# Patient Record
Sex: FEMALE | Race: WHITE | NOT HISPANIC OR LATINO | Employment: UNEMPLOYED | URBAN - METROPOLITAN AREA
[De-identification: names, ages, dates, MRNs, and addresses within clinical notes are randomized per-mention and may not be internally consistent; named-entity substitution may affect disease eponyms.]

---

## 2017-02-13 DIAGNOSIS — H81.01 MENIERE'S DISEASE, RIGHT: ICD-10-CM

## 2017-02-13 NOTE — TELEPHONE ENCOUNTER
diazepam (VALIUM) 5 MG tablet      Last Written Prescription Date:  8/31/16  Last Fill Quantity: 60,   # refills: 0  Last Office Visit with [facility], [email or web address] or [facility] prescribing provider: 12/14/16  Future Office visit:       Routing refill request to provider for review/approval because:  Drug not on the [facility], [email or web address] or [facility] refill protocol or controlled substance

## 2017-02-14 RX ORDER — DIAZEPAM 5 MG
TABLET ORAL
Qty: 60 TABLET | Refills: 0 | Status: SHIPPED | OUTPATIENT
Start: 2017-02-14 | End: 2017-06-14

## 2017-02-15 DIAGNOSIS — G47.00 PERSISTENT INSOMNIA: ICD-10-CM

## 2017-02-15 DIAGNOSIS — H81.09 MENIERE DISEASE, UNSPECIFIED LATERALITY: Primary | ICD-10-CM

## 2017-02-15 RX ORDER — TRAZODONE HYDROCHLORIDE 50 MG/1
50 TABLET, FILM COATED ORAL
Qty: 30 TABLET | Refills: 1 | Status: SHIPPED | OUTPATIENT
Start: 2017-02-15 | End: 2017-05-17

## 2017-02-15 NOTE — TELEPHONE ENCOUNTER
traZODone      Last Written Prescription Date:  12/14/16  Last Fill Quantity: 30,   # refills: 1  Last Office Visit with [facility], [facility] or  [facility] prescribing provider: 12/14/16  Last date of pharmacy refill:  12/14/16  Future Office visit:       Routing refill request to provider for review/approval because:  Drug not on the [facility], [facility] or  [facility] refill protocol or controlled substance

## 2017-03-09 DIAGNOSIS — F41.9 ANXIETY: ICD-10-CM

## 2017-03-09 NOTE — TELEPHONE ENCOUNTER
Effexor    Last Written Prescription Date: 02/07/17  Last Fill Quantity: 30, # refills: 1  Last Office Visit with [facility], [facility] or [facility] prescribing provider: 12/14/16        BP Readings from Last 3 Encounters:   12/14/16 122/70   09/23/16 131/74   09/12/16 123/72     Pulse: (for Fetzima)  Creatinine   Date Value Ref Range Status   08/31/2016 0.75 0.52 - 1.04 mg/dL Final   06/16/2004 1.0 0.0 - 1.5 mg/dL    ]    Last PHQ-9 score on record=   PHQ-9 SCORE 8/17/2016   Total Score -   Total Score MyChart -   Total Score 3

## 2017-03-10 RX ORDER — VENLAFAXINE HYDROCHLORIDE 150 MG/1
150 CAPSULE, EXTENDED RELEASE ORAL DAILY
Qty: 90 CAPSULE | Refills: 0 | Status: SHIPPED | OUTPATIENT
Start: 2017-03-10 | End: 2017-06-06

## 2017-04-08 DIAGNOSIS — G47.00 PERSISTENT INSOMNIA: ICD-10-CM

## 2017-04-10 NOTE — TELEPHONE ENCOUNTER
Zolpidem Tartrate Oral Tablet 10 MG        Last Written Prescription Date:  11/15/16  Last Fill Quantity: 30,   # refills: 4  Last Office Visit with [facility], [facility] or [facility] prescribing provider: 12/14/16  Future Office visit:       Routing refill request to provider for review/approval because:  Drug not on the [facility], [facility] or [facility] refill protocol or controlled substance

## 2017-04-11 RX ORDER — ZOLPIDEM TARTRATE 10 MG/1
TABLET ORAL
Qty: 30 TABLET | Refills: 3 | Status: SHIPPED | OUTPATIENT
Start: 2017-04-11 | End: 2017-08-08

## 2017-05-17 DIAGNOSIS — G47.00 PERSISTENT INSOMNIA: ICD-10-CM

## 2017-05-18 NOTE — TELEPHONE ENCOUNTER
TraZODone HCl Oral Tablet 50 MG         Last Written Prescription Date: 2/15/17  Last Fill Quantity: 30; # refills: 1  Last Office Visit with [facility], [facility] or [facility] prescribing provider:  12/14/16        Last PHQ-9 score on record=   PHQ-9 SCORE 8/17/2016   Total Score -   Total Score MyChart -   Total Score 3       Lab Results   Component Value Date    AST 36 08/17/2016     Lab Results   Component Value Date    ALT 39 08/17/2016

## 2017-05-19 RX ORDER — TRAZODONE HYDROCHLORIDE 50 MG/1
TABLET, FILM COATED ORAL
Qty: 30 TABLET | Refills: 1 | Status: SHIPPED | OUTPATIENT
Start: 2017-05-19 | End: 2017-05-19

## 2017-05-19 RX ORDER — TRAZODONE HYDROCHLORIDE 50 MG/1
TABLET, FILM COATED ORAL
Qty: 30 TABLET | Refills: 1 | Status: SHIPPED | OUTPATIENT
Start: 2017-05-19 | End: 2017-11-04

## 2017-05-19 NOTE — TELEPHONE ENCOUNTER
Marlyn requesting medication on 5/17 and it was send to RN refill pool.    She was hoping to get this filled today, as she is leaving after work today for [place].  Please review and fill if appropriate.  Thank you..Evette Ricks

## 2017-05-19 NOTE — TELEPHONE ENCOUNTER
Refilled per RN protocol. Sent to [facility] in [facility], called patient and she wishes it to go to [facility] in [place] instead. Script sent to correct pharmacy and patient is notified.  Aicha

## 2017-06-06 DIAGNOSIS — F41.9 ANXIETY: ICD-10-CM

## 2017-06-06 NOTE — TELEPHONE ENCOUNTER
Venlafaxine HCl ER Oral Capsule Extended Release 24 Hour 150 MG       Last Written Prescription Date: 3/10/17  Last Fill Quantity: 90, # refills: 0  Last Office Visit with [facility] primary care provider:  12/14/16        Last PHQ-9 score on record=   PHQ-9 SCORE 8/17/2016   Total Score -   Total Score MyChart -   Total Score 3

## 2017-06-08 RX ORDER — VENLAFAXINE HYDROCHLORIDE 150 MG/1
CAPSULE, EXTENDED RELEASE ORAL
Qty: 90 CAPSULE | Refills: 0 | Status: SHIPPED | OUTPATIENT
Start: 2017-06-08 | End: 2017-09-07

## 2017-06-08 NOTE — TELEPHONE ENCOUNTER
Medication is being filled for 1 time refill only due to:  Patient needs to be seen because needs follow up and phq9.   Mark Cosme RN

## 2017-06-14 ENCOUNTER — OFFICE VISIT (OUTPATIENT)
Dept: FAMILY MEDICINE | Facility: CLINIC | Age: 57
End: 2017-06-14
Payer: COMMERCIAL

## 2017-06-14 VITALS
SYSTOLIC BLOOD PRESSURE: 108 MMHG | HEART RATE: 65 BPM | TEMPERATURE: 97.5 F | DIASTOLIC BLOOD PRESSURE: 61 MMHG | BODY MASS INDEX: 35.85 KG/M2 | WEIGHT: 202.4 LBS

## 2017-06-14 DIAGNOSIS — R05.9 COUGH: ICD-10-CM

## 2017-06-14 DIAGNOSIS — R07.0 THROAT PAIN: Primary | ICD-10-CM

## 2017-06-14 DIAGNOSIS — J30.2 SEASONAL ALLERGIC RHINITIS, UNSPECIFIED ALLERGIC RHINITIS TRIGGER: ICD-10-CM

## 2017-06-14 DIAGNOSIS — L40.9 PSORIASIS: ICD-10-CM

## 2017-06-14 DIAGNOSIS — H81.01 MENIERE'S DISEASE, RIGHT: ICD-10-CM

## 2017-06-14 LAB
DEPRECATED S PYO AG THROAT QL EIA: NORMAL
MICRO REPORT STATUS: NORMAL
SPECIMEN SOURCE: NORMAL

## 2017-06-14 PROCEDURE — 87070 CULTURE OTHR SPECIMN AEROBIC: CPT | Performed by: PHYSICIAN ASSISTANT

## 2017-06-14 PROCEDURE — 87880 STREP A ASSAY W/OPTIC: CPT | Performed by: PHYSICIAN ASSISTANT

## 2017-06-14 PROCEDURE — 99213 OFFICE O/P EST LOW 20 MIN: CPT | Performed by: PHYSICIAN ASSISTANT

## 2017-06-14 RX ORDER — DIAZEPAM 5 MG
TABLET ORAL
Qty: 60 TABLET | Refills: 0 | Status: SHIPPED | OUTPATIENT
Start: 2017-06-14 | End: 2017-07-06

## 2017-06-14 RX ORDER — CODEINE PHOSPHATE AND GUAIFENESIN 10; 100 MG/5ML; MG/5ML
1-2 SOLUTION ORAL EVERY 4 HOURS PRN
Qty: 120 ML | Refills: 0 | Status: SHIPPED | OUTPATIENT
Start: 2017-06-14 | End: 2017-12-14

## 2017-06-14 RX ORDER — BETAMETHASONE DIPROPIONATE 0.05 %
OINTMENT (GRAM) TOPICAL
Qty: 45 G | Refills: 0 | Status: SHIPPED | OUTPATIENT
Start: 2017-06-14 | End: 2019-07-19

## 2017-06-14 RX ORDER — PREDNISONE 20 MG/1
TABLET ORAL
Qty: 20 TABLET | Refills: 0 | Status: SHIPPED | OUTPATIENT
Start: 2017-06-14 | End: 2018-09-27

## 2017-06-14 NOTE — PATIENT INSTRUCTIONS
Have plenty of rest and fluids  Humidified air can be very helpful with cough  Use robitussin at night - can make you groggy  Flonase. Use nasal spray Afrin OTC for 3-4 days in each nostril for congestion  Follow up if worsening symptoms or if not improving - especially be seen if difficulty swallowing, increased pain, worsening symptoms or not keeping fluids down    Use your Meniere medication as needed

## 2017-06-14 NOTE — MR AVS SNAPSHOT
After Visit Summary   6/14/2017    Kenya Rubalcava    MRN: [id number]           Patient Information     Date Of Birth          1960        Visit Information        Provider Department      6/14/2017 9:00 AM Evelyne Samuels PA-C [facility]        Today's Diagnoses     Throat pain    -  1    Meniere's disease, right        Seasonal allergic rhinitis, unspecified allergic rhinitis trigger        Psoriasis        Cough          Care Instructions    Have plenty of rest and fluids  Humidified air can be very helpful with cough  Use robitussin at night - can make you groggy  Flonase. Use nasal spray Afrin OTC for 3-4 days in each nostril for congestion  Follow up if worsening symptoms or if not improving - especially be seen if difficulty swallowing, increased pain, worsening symptoms or not keeping fluids down    Use your Meniere medication as needed          Follow-ups after your visit        Who to contact     Normal or non-critical lab and imaging results will be communicated to you by [employer], letter or phone within 4 business days after the clinic has received the results. If you do not hear from us within 7 days, please contact the clinic through [employer] or phone. If you have a critical or abnormal lab result, we will notify you by phone as soon as possible.  Submit refill requests through [employer] or call your pharmacy and they will forward the refill request to us. Please allow 3 business days for your refill to be completed.          If you need to speak with a  for additional information , please call: [phone]             Additional Information About Your Visit        [employer] Information     [employer] gives you secure access to your electronic health record. If you see a primary care provider, you can also send messages to your care team and make appointments. If you have questions, please call your primary care clinic.  If you do not have a primary care  provider, please call [phone] and they will assist you.        Care EveryWhere ID     This is your Care EveryWhere ID. This could be used by other organizations to access your [facility] medical records  [id number]        Your Vitals Were     Pulse Temperature BMI (Body Mass Index)             65 97.5  F (36.4  C) (Tympanic) 35.85 kg/m2          Blood Pressure from Last 3 Encounters:   06/14/17 108/61   12/14/16 122/70   09/23/16 131/74    Weight from Last 3 Encounters:   06/14/17 202 lb 6.4 oz (91.8 kg)   12/14/16 194 lb 1.6 oz (88 kg)   09/23/16 190 lb (86.2 kg)              We Performed the Following     Strep, Rapid Screen     Throat Culture Aerobic Bacterial          Today's Medication Changes          These changes are accurate as of: 6/14/17  9:35 AM.  If you have any questions, ask your nurse or doctor.               Start taking these medicines.        Dose/Directions    betamethasone dipropionate 0.05 % ointment   Commonly known as:  DIPROSONE   Used for:  Psoriasis   Started by:  Evelyne Samuels PA-C        Apply sparingly to affected area twice daily as needed.  Do not apply to face.   Quantity:  45 g   Refills:  0       guaiFENesin-codeine 100-10 MG/5ML Soln solution   Commonly known as:  ROBITUSSIN AC   Used for:  Cough   Started by:  Evelyne Samuels PA-C        Dose:  1-2 tsp.   Take 5-10 mLs by mouth every 4 hours as needed   Quantity:  120 mL   Refills:  0       predniSONE 20 MG tablet   Commonly known as:  DELTASONE   Used for:  Seasonal allergic rhinitis, unspecified allergic rhinitis trigger, Meniere's disease, right   Started by:  Evelyne Samuels PA-C        Take 3 tabs (60 mg) orally daily for 3 days, 2 tabs (40 mg) daily for 3 days, 1 tab (20 mg) daily for 3 days, then 1/2 tab (10 mg) for 3 day   Quantity:  20 tablet   Refills:  0            Where to get your medicines      These medications were sent to [facility] [place], [place] - [address] [facility] [address]  [address] [facility] [address]     Phone:  [phone]     betamethasone dipropionate 0.05 % ointment    predniSONE 20 MG tablet         Some of these will need a paper prescription and others can be bought over the counter.  Ask your nurse if you have questions.     Bring a paper prescription for each of these medications     diazepam 5 MG tablet    guaiFENesin-codeine 100-10 MG/5ML Soln solution                Primary Care Provider Office Phone # Fax #    Carlie Real [phone]       [facility] [address] [facility] [address]        Thank you!     Thank you for choosing [facility]  for your care. Our goal is always to provide you with excellent care. Hearing back from our patients is one way we can continue to improve our services. Please take a few minutes to complete the written survey that you may receive in the mail after your visit with us. Thank you!             Your Updated Medication List - Protect others around you: Learn how to safely use, store and throw away your medicines at www.disposemymeds.org.          This list is accurate as of: 6/14/17  9:35 AM.  Always use your most recent med list.                   Brand Name Dispense Instructions for use    aspirin 325 MG EC tablet      one tablet by mouth daily (buffered)       atenolol 50 MG tablet    TENORMIN    90 tablet    Take 1 tablet (50 mg) by mouth daily       betamethasone dipropionate 0.05 % ointment    DIPROSONE    45 g    Apply sparingly to affected area twice daily as needed.  Do not apply to face.       diazepam 5 MG tablet    VALIUM    60 tablet    Take 1 as needed for menieres daily       DIPHENHYDRAMINE HCL PO      Take 25 mg by mouth daily as needed (for Menieres disease)       fluticasone 50 MCG/ACT spray    FLONASE    3 Package    Spray 1-2 sprays into both nostrils daily       guaiFENesin-codeine 100-10 MG/5ML Soln solution    ROBITUSSIN AC    120 mL    Take 5-10 mLs by mouth every 4  hours as needed       meclizine 25 MG tablet    ANTIVERT    60 tablet    Take 2 tablets by mouth 2 times daily as needed.       pravastatin 40 MG tablet    PRAVACHOL    30 tablet    Take 1 tablet (40 mg) by mouth At Bedtime       predniSONE 20 MG tablet    DELTASONE    20 tablet    Take 3 tabs (60 mg) orally daily for 3 days, 2 tabs (40 mg) daily for 3 days, 1 tab (20 mg) daily for 3 days, then 1/2 tab (10 mg) for 3 day       prochlorperazine 10 MG tablet    COMPAZINE    10 tablet    Take 0.5 tablets (5 mg) by mouth every 6 hours as needed for nausea or vomiting       traZODone 50 MG tablet    DESYREL    30 tablet    TAKE 1 TABLET (50 MG) BY MOUTH ONCE A DAY AT BEDTIME AS NEEDED FOR SLEEP       triamterene-hydrochlorothiazide 37.5-25 MG per capsule    DYAZIDE    90 capsule    Take 1 capsule by mouth every morning       venlafaxine 150 MG 24 hr capsule    EFFEXOR-XR    90 capsule    take 1 capsule by mouth once daily       zolpidem 10 MG tablet    AMBIEN    30 tablet    TAKE 1 TABLET BY MOUTH NIGHTLY AT BEDTIME AS NEEDED FOR SLEEP

## 2017-06-14 NOTE — PROGRESS NOTES
SUBJECTIVE:                                                    Kenya Rubalcava is a 56 year old female who presents to clinic today for the following health issues:    ENT Symptoms             Symptoms: cc Present Absent Comment   Fever/Chills   x    Fatigue  x     Muscle Aches   x    Eye Irritation   x    Sneezing   x    Nasal Isaías/Drg  x     Sinus Pressure/Pain   x    Loss of smell   x    Dental pain   x    Sore Throat x x     Swollen Glands  x     Ear Pain/Fullness  x  Pain in left ear last couple days   Cough  x     Wheeze   x    Chest Pain   x    Shortness of breath   x    Rash   x    Other  x  Headaches, dizziness     Symptom duration:  Started Sunday   Symptom severity:  moderate   Treatments tried:  None   Contacts:  coworkers - principal of school, 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 Just that day. Since then keeping fluids down, eating okay.  She has known Meniere's disease, usually triggered by congestion.  - has valium/meclizine, lies down. Also supposed to have prednisone to prevent full attack (hasn't gotten to that point yet)  - has not become full blown at this point  Has springtime allergies  - hasn't had to use flonase yet    Problem list and histories reviewed & adjusted, as indicated.  Additional history: none    Patient Active Problem List   Diagnosis     Anxiety disorder due to medical condition     Essential hypertension, benign     Sensorineural hearing loss     Peripheral vertigo     Allergic rhinitis     Symptomatic menopausal or female climacteric states     Psoriasis     Hyperlipidemia LDL goal <130     Kidney stones     Hypertriglyceridemia     Meniere disease     Anxiety     Kidney stone     Past Surgical History:   Procedure Laterality Date     CARPAL TUNNEL RELEASE RT/LT  08/2008    Left      COLONOSCOPY  8/7/2012    Procedure: COLONOSCOPY;  Colonoscopy;  Surgeon: Kailyn Lopez MD;  Location: [facility]     CYSTOSCOPY, RETROGRADES, INSERT STENT URETER(S),  COMBINED Left 8/19/2016    Procedure: COMBINED CYSTOSCOPY, RETROGRADES, INSERT STENT URETER(S);  Surgeon: Terence Carpenter MD;  Location: [facility]     esteem implant  06/23/2011    Hearing implant right ear.     HYSTERECTOMY, PAP NO LONGER INDICATED  12/2004    Vaginal hysterectomy     LAPAROSCOPIC ABLATION ENDOMETRIOSIS  07/2004     LASER HOLMIUM LITHOTRIPSY URETER(S), INSERT STENT, COMBINED Left 9/12/2016    Procedure: COMBINED CYSTOSCOPY, URETEROSCOPY, LASER HOLMIUM LITHOTRIPSY URETER(S), INSERT STENT;  Surgeon: Kailyn Dozier MD;  Location: [facility]     LEFT wrist surgery       Sinus surgery with repair of deviated septum       TUBAL LIGATION         Social History   Substance Use Topics     Smoking status: Never Smoker     Smokeless tobacco: Never Used     Alcohol use 0.0 oz/week     0 Standard drinks or equivalent per week      Comment: rare      Family History   Problem Relation Age of Onset     HEART DISEASE Brother      Hypertension Brother      Asthma Daughter      HEART DISEASE Brother      Hypertension Brother      C.A.D. Father      MI      Hypertension Father      Lipids Father 50     C.A.D. Paternal Aunt      MI-50s     CANCER Maternal Aunt      Ovarian CA     CEREBROVASCULAR DISEASE No family hx of      Breast Cancer No family hx of      Cancer - colorectal No family hx of      Prostate Cancer No family hx of          Labs reviewed in EPIC    ROS:  Other than noted above, general, HEENT, respiratory, cardiac, MS, and gastrointestinal systems are negative.     OBJECTIVE:                                                    /61 (BP Location: Left arm, Patient Position: Chair, Cuff Size: Adult Regular)  Pulse 65  Temp 97.5  F (36.4  C) (Tympanic)  Wt 202 lb 6.4 oz (91.8 kg)  BMI 35.85 kg/m2 Body mass index is 35.85 kg/(m^2).   GENERAL: healthy, alert, well nourished, well hydrated, no distress  EYES: Eyes grossly normal to inspection, extraocular movements - intact, and PERRL  HENT:  ear canals- normal; TMs- normal; Nose- normal; Mouth- no ulcers, no lesions POSITIVE posterior oropharynx erythematous, no tonsillitis, uvula midline  NECK: POSITIVE tender submandibular adenopathy, no asymmetry, no masses, no stiffness; thyroid- normal to palpation  RESP: lungs clear to auscultation - no rales, no rhonchi, no wheezes  CV: regular rates and rhythm, normal S1 S2, no S3 or S4 and no murmur, no click or rub -  ABDOMEN: soft, no tenderness, no  hepatosplenomegaly, no masses, normal bowel sounds  SKIN: POSITIVE dry skin patches on bilateral elbows     ASSESSMENT/PLAN:                                                      ASSESSMENT/PLAN:      ICD-10-CM    1. Throat pain R07.0 Strep, Rapid Screen     Throat Culture Aerobic Bacterial   2. Meniere's disease, right H81.01 diazepam (VALIUM) 5 MG tablet     predniSONE (DELTASONE) 20 MG tablet   3. Seasonal allergic rhinitis, unspecified allergic rhinitis trigger J30.2 predniSONE (DELTASONE) 20 MG tablet   4. Psoriasis L40.9 betamethasone dipropionate (DIPROSONE) 0.05 % ointment   5. Cough R05 guaiFENesin-codeine (ROBITUSSIN AC) 100-10 MG/5ML SOLN solution     She feels her symptoms are typical of mild Meneire's for her - refilled the meds she keeps on hand. And likely viral syndrome as well.  Discussed red flag signs to be seen urgently.  She also would like psoriasis cream refilled.    Patient Instructions   Have plenty of rest and fluids  Humidified air can be very helpful with cough  Use robitussin at night - can make you groggy  Flonase. Use nasal spray Afrin OTC for 3-4 days in each nostril for congestion  Follow up if worsening symptoms or if not improving - especially be seen if difficulty swallowing, increased pain, worsening symptoms or not keeping fluids down    Use your Meniere medication as needed    Evelyne Samuels PA-C   [facility]

## 2017-06-14 NOTE — LETTER
[facility]  [address]  [facility] [address]  Phone: [phone]    June 19, 2017    Kenya Rubalcava  [address]  [facility] [address]              Dear Ms. Rubalcava,    Hi Kenya,  Your throat culture is normal - please follow up if symptoms worsen or are not improving.    Component      Latest Ref Rng & Units 6/14/2017   Specimen Description       Throat   Culture Micro       Normal respiratory guillermo   Micro Report Status       FINAL 06/16/2017             Sincerely,      Luz Maria REILLY/ yael

## 2017-06-16 LAB
BACTERIA SPEC CULT: NORMAL
MICRO REPORT STATUS: NORMAL
SPECIMEN SOURCE: NORMAL

## 2017-07-06 DIAGNOSIS — H81.01 MENIERE'S DISEASE, RIGHT: ICD-10-CM

## 2017-07-06 NOTE — TELEPHONE ENCOUNTER
diazepam (VALIUM) 5 MG tablet      Last Written Prescription Date:  6/14/17  Last Fill Quantity: 60,   # refills: 0  Last Office Visit with [facility], [facility] or [facility] prescribing provider: 6/14/17  Future Office visit:       Routing refill request to provider for review/approval because:  Drug not on the [facility], [facility] or [facility] refill protocol or controlled substance

## 2017-07-07 RX ORDER — DIAZEPAM 5 MG
TABLET ORAL
Qty: 60 TABLET | Refills: 0 | Status: SHIPPED | OUTPATIENT
Start: 2017-07-07 | End: 2018-12-05

## 2017-07-07 RX ORDER — DIAZEPAM 5 MG
5 TABLET ORAL EVERY 12 HOURS PRN
Qty: 60 TABLET | Refills: 0 | Status: SHIPPED | OUTPATIENT
Start: 2017-07-07 | End: 2017-12-14

## 2017-07-07 NOTE — TELEPHONE ENCOUNTER
Script faxed to [facility] pharmacy in [place].    Poornima Baez, [facility]

## 2017-07-30 DIAGNOSIS — E78.5 HYPERLIPIDEMIA LDL GOAL <130: ICD-10-CM

## 2017-07-31 RX ORDER — PRAVASTATIN SODIUM 40 MG
TABLET ORAL
Qty: 90 TABLET | Refills: 0 | Status: SHIPPED | OUTPATIENT
Start: 2017-07-31 | End: 2017-10-29

## 2017-07-31 NOTE — TELEPHONE ENCOUNTER
Pravastatin Sodium Oral Tablet 40 MG       Last Written Prescription Date: 12/14/16  Last Fill Quantity: 30, # refills: 6  Last Office Visit with [facility] or [facility] prescribing provider: 6/14/17       Lab Results   Component Value Date    CHOL 185 08/17/2016     Lab Results   Component Value Date    HDL 31 08/17/2016     Lab Results   Component Value Date    LDL 84 08/17/2016     Lab Results   Component Value Date    TRIG 349 08/17/2016     Lab Results   Component Value Date    CHOLHDLRATIO 6.9 08/18/2015

## 2017-08-08 DIAGNOSIS — G47.00 PERSISTENT INSOMNIA: ICD-10-CM

## 2017-08-08 NOTE — TELEPHONE ENCOUNTER
zolpidem (AMBIEN) 10 MG tablet      Last Written Prescription Date:  4/11/17  Last Fill Quantity: 30,   # refills: 3  Last Office Visit with [facility], [facility] or [facility] prescribing provider: 6/14/17  Future Office visit:       Routing refill request to provider for review/approval because:  Drug not on the [facility], [facility] or [facility] refill protocol or controlled substance

## 2017-08-14 RX ORDER — ZOLPIDEM TARTRATE 10 MG/1
TABLET ORAL
Qty: 30 TABLET | Refills: 3 | Status: SHIPPED | OUTPATIENT
Start: 2017-08-14 | End: 2017-12-14

## 2017-08-21 DIAGNOSIS — E78.5 HYPERLIPIDEMIA LDL GOAL <130: ICD-10-CM

## 2017-08-21 NOTE — TELEPHONE ENCOUNTER
Triamterene-HCTZ Oral Capsule 37.5-25 MG        Last Written Prescription Date: 8/17/16  Last Fill Quantity: 90, # refills: 3  Last Office Visit with [facility], [facility] or [facility] prescribing provider: 6/14/17       Potassium   Date Value Ref Range Status   08/31/2016 4.3 3.4 - 5.3 mmol/L Final     Creatinine   Date Value Ref Range Status   08/31/2016 0.75 0.52 - 1.04 mg/dL Final     BP Readings from Last 3 Encounters:   06/14/17 108/61   12/14/16 122/70   09/23/16 131/74

## 2017-08-22 RX ORDER — TRIAMTERENE AND HYDROCHLOROTHIAZIDE 37.5; 25 MG/1; MG/1
CAPSULE ORAL
Qty: 90 CAPSULE | Refills: 0 | Status: SHIPPED | OUTPATIENT
Start: 2017-08-22 | End: 2017-11-23

## 2017-08-22 NOTE — TELEPHONE ENCOUNTER
Prescription approved per [facility] Refill Protocol.  Mark Cosme RN

## 2017-09-16 DIAGNOSIS — I10 ESSENTIAL HYPERTENSION, BENIGN: ICD-10-CM

## 2017-09-18 RX ORDER — ATENOLOL 50 MG/1
TABLET ORAL
Qty: 90 TABLET | Refills: 2 | Status: SHIPPED | OUTPATIENT
Start: 2017-09-18 | End: 2018-06-11

## 2017-09-18 NOTE — TELEPHONE ENCOUNTER
Prescription approved per [facility] Refill Protocol.  Mark Cosme RN

## 2017-09-18 NOTE — TELEPHONE ENCOUNTER
Atenolol Oral Tablet 50 MG        Last Written Prescription Date: 9/21/16  Last Fill Quantity: 90, # refills: 3    Last Office Visit with [facility], [facility] or [facility] prescribing provider:  6/14/17   Future Office Visit:        BP Readings from Last 3 Encounters:   06/14/17 108/61   12/14/16 122/70   09/23/16 131/74

## 2017-10-29 DIAGNOSIS — E78.5 HYPERLIPIDEMIA LDL GOAL <130: ICD-10-CM

## 2017-10-29 DIAGNOSIS — I10 ESSENTIAL HYPERTENSION, BENIGN: Primary | ICD-10-CM

## 2017-10-30 RX ORDER — PRAVASTATIN SODIUM 40 MG
TABLET ORAL
Qty: 30 TABLET | Refills: 0 | Status: SHIPPED | OUTPATIENT
Start: 2017-10-30 | End: 2017-12-02

## 2017-10-30 NOTE — TELEPHONE ENCOUNTER
Medication is being filled for 1 time refill only due to:  Patient needs labs lipids,cmp. Future labs ordered yes.   Mark Cosme RN

## 2017-11-04 DIAGNOSIS — G47.00 PERSISTENT INSOMNIA: ICD-10-CM

## 2017-11-07 RX ORDER — TRAZODONE HYDROCHLORIDE 50 MG/1
TABLET, FILM COATED ORAL
Qty: 30 TABLET | Refills: 0 | Status: SHIPPED | OUTPATIENT
Start: 2017-11-07 | End: 2017-12-04

## 2017-11-23 DIAGNOSIS — E78.5 HYPERLIPIDEMIA LDL GOAL <130: ICD-10-CM

## 2017-11-24 NOTE — TELEPHONE ENCOUNTER
Prescription approved per [facility] Refill Protocol.  Marlyn Duffy RN

## 2017-11-25 ENCOUNTER — HOSPITAL ENCOUNTER (EMERGENCY)
Facility: CLINIC | Age: 57
Discharge: HOME | End: 2017-11-25
Attending: PHYSICIAN ASSISTANT | Admitting: PHYSICIAN ASSISTANT
Payer: COMMERCIAL

## 2017-11-25 VITALS
OXYGEN SATURATION: 96 % | RESPIRATION RATE: 16 BRPM | DIASTOLIC BLOOD PRESSURE: 67 MMHG | TEMPERATURE: 97.8 F | SYSTOLIC BLOOD PRESSURE: 125 MMHG

## 2017-11-25 DIAGNOSIS — L60.0 INGROWING TOENAIL WITH INFECTION: Primary | ICD-10-CM

## 2017-11-25 PROCEDURE — 99213 OFFICE O/P EST LOW 20 MIN: CPT | Performed by: PHYSICIAN ASSISTANT

## 2017-11-25 PROCEDURE — 99213 OFFICE O/P EST LOW 20 MIN: CPT

## 2017-11-25 RX ORDER — CEPHALEXIN 500 MG/1
500 CAPSULE ORAL 4 TIMES DAILY
Qty: 28 CAPSULE | Refills: 0 | Status: SHIPPED | OUTPATIENT
Start: 2017-11-25 | End: 2017-12-02

## 2017-11-25 NOTE — ED PROVIDER NOTES
History     Chief Complaint   Patient presents with     Toe Pain     Pt has pedicure a month ago.  Having L great toe pain and swelling thinks it is infected,       HPI  Kenyase Rubalcava is a 57 year old female who presents with complaints of left great toe pain and swelling over the past month.  Patient states after she got a pedicure about a month ago, she subsequently developed an ingrowing left great toenail.  She states it has been progressively worsening.  Pt reports worsening surrounding swelling and redness.  The pain has also been worsening.  Patient denies any associated fevers or chills.  She is concerned it is now infected.  Pt has been soaking her toe without improvement.     Problem List:    Patient Active Problem List    Diagnosis Date Noted     Kidney stone 07/19/2016     Priority: Medium     Anxiety 05/14/2015     Priority: Medium     Meniere disease 11/11/2010     Priority: Medium     Diagnosed 10/10 - active in left ear. Trying dyazide daily, serax twice daily, meclizine qid        Kidney stones 06/15/2009     Priority: Medium     Bilateral noted on CT 5/23/09       Hypertriglyceridemia 06/15/2009     Priority: Medium     Hyperlipidemia LDL goal <130 06/30/2008     Priority: Medium     Recent Labs   Lab Test 11/26/10 0847 7/1/10 0927     CHOL 188 164     HDL 28* 27*     LDL Cannot estimate LDL when triglyceride exceeds 400 mg/dL93 Cannot estimate LDL when triglyceride exceeds 400 mg/dL     TRIG 443* 431*     CHOLHDLRATIO 7.0* 6.0*     11/29/10 - Just added fenofibrate and recheck in 2 months lipid/ast        Psoriasis 05/22/2008     Priority: Medium     Less than 1% body surface area - 1% ointment Triamcinalone, scalp Head&Shoulders or Nizoral Chualar-Smoothe FS prescriptions.       Symptomatic menopausal or female climacteric states 06/25/2007     Priority: Medium     Peripheral vertigo 09/06/2006     Priority: Medium     Problem list name updated by automated process. Provider to review        Essential hypertension, benign 05/02/2006     Priority: Medium     Anxiety disorder due to medical condition      Priority: Medium     Treatment tried:  Buspar 7.5 mg bid (stopped 03/21/2005), Celexa started 03/21/2005    Problem list name updated by automated process. Provider to review       Allergic rhinitis 06/16/2004     Priority: Medium     Environmental allergies  Problem list name updated by automated process. Provider to review       Sensorineural hearing loss 07/05/2002     Priority: Medium     Referred to Dr Street, ENT Yefri Honeycutt by previous clinic  Problem list name updated by automated process. Provider to review          Past Medical History:    Past Medical History:   Diagnosis Date     Abnormal Papanicolaou smear of cervix and cervical HPV 1986     Anxiety      Anxiety disorder in conditions classified elsewhere      Calculus of kidney 08/11/1988     Calculus of ureter 06/30/1985     Excessive or frequent menstruation 07/07/2003     Labyrinthitis, unspecified      Lump or mass in breast 10/27/1999     Variants of migraine, not elsewhere classified, without mention of intractable migraine without mention of status migrainosus 02/07/2002       Past Surgical History:    Past Surgical History:   Procedure Laterality Date     CARPAL TUNNEL RELEASE RT/LT  08/2008    Left      COLONOSCOPY  8/7/2012    Procedure: COLONOSCOPY;  Colonoscopy;  Surgeon: Kailyn Lopez MD;  Location: [facility]     CYSTOSCOPY, RETROGRADES, INSERT STENT URETER(S), COMBINED Left 8/19/2016    Procedure: COMBINED CYSTOSCOPY, RETROGRADES, INSERT STENT URETER(S);  Surgeon: Terence Carpenter MD;  Location: [facility]     esteem implant  06/23/2011    Hearing implant right ear.     HYSTERECTOMY, PAP NO LONGER INDICATED  12/2004    Vaginal hysterectomy     LAPAROSCOPIC ABLATION ENDOMETRIOSIS  07/2004     LASER HOLMIUM LITHOTRIPSY URETER(S), INSERT STENT, COMBINED Left 9/12/2016    Procedure: COMBINED CYSTOSCOPY,  URETEROSCOPY, LASER HOLMIUM LITHOTRIPSY URETER(S), INSERT STENT;  Surgeon: Kailyn Dozier MD;  Location: [facility]     LEFT wrist surgery       Sinus surgery with repair of deviated septum       TUBAL LIGATION         Family History:    Family History   Problem Relation Age of Onset     HEART DISEASE Brother      Hypertension Brother      Asthma Daughter      HEART DISEASE Brother      Hypertension Brother      C.A.D. Father      MI      Hypertension Father      Lipids Father 50     C.A.D. Paternal Aunt      MI-50s     CANCER Maternal Aunt      Ovarian CA     CEREBROVASCULAR DISEASE No family hx of      Breast Cancer No family hx of      Cancer - colorectal No family hx of      Prostate Cancer No family hx of        Social History:  Marital Status:  Single [1]  Social History   Substance Use Topics     Smoking status: Never Smoker     Smokeless tobacco: Never Used     Alcohol use 0.0 oz/week     0 Standard drinks or equivalent per week      Comment: rare         Medications:      cephALEXin (KEFLEX) 500 MG capsule   traZODone (DESYREL) 50 MG tablet   pravastatin (PRAVACHOL) 40 MG tablet   atenolol (TENORMIN) 50 MG tablet   venlafaxine (EFFEXOR-XR) 150 MG 24 hr capsule   triamterene-hydrochlorothiazide (DYAZIDE) 37.5-25 MG per capsule   zolpidem (AMBIEN) 10 MG tablet   diazepam (VALIUM) 5 MG tablet   diazepam (VALIUM) 5 MG tablet   predniSONE (DELTASONE) 20 MG tablet   guaiFENesin-codeine (ROBITUSSIN AC) 100-10 MG/5ML SOLN solution   betamethasone dipropionate (DIPROSONE) 0.05 % ointment   prochlorperazine (COMPAZINE) 10 MG tablet   DIPHENHYDRAMINE HCL PO   fluticasone (FLONASE) 50 MCG/ACT nasal spray   meclizine (ANTIVERT) 25 MG tablet   ASPIRIN 325 MG OR TBEC         Review of Systems   Constitutional: Negative.    Musculoskeletal:        Left great toe pain and swelling   Skin:        Redness of left great toe   Neurological: Negative.    All other systems reviewed and are negative.      Physical Exam   BP:  125/67  Heart Rate: 61  Temp: 97.8  F (36.6  C)  Resp: 16  SpO2: 96 %      Physical Exam   Constitutional: She appears well-developed and well-nourished. No distress.   HENT:   Head: Normocephalic and atraumatic.   Musculoskeletal:        Left foot: There is tenderness and swelling.   Tenderness, swelling, erythema, and warmth to left great toe adjacent to nail and extending circumferentially around toe.  No streaking erythema.  No drainage.   Neurological: She is alert. She has normal strength. No sensory deficit.   Skin: Skin is warm and dry.       ED Course     ED Course     Procedures      Assessments & Plan (with Medical Decision Making)     Pt is a 57 year old female who presents with complaints of left great toe pain and swelling over the past month.  Patient states after she got a pedicure about a month ago, she subsequently developed an ingrowing left great toenail.  She states it has been progressively worsening.  Pt reports worsening surrounding swelling and redness.  The pain has also been worsening.  Patient denies any associated fevers or chills.  She is concerned it is now infected.  Pt has been soaking her toe without improvement.  Pt is afebrile on arrival.  Exam as above.  Discussed risks versus benefits of partial toenail removal for treatment of ingrown toenail, and patient would like to proceed with this procedure.  Therefore, pt's nail was partially removed after a digital block was placed.  Dressing placed.  Will place on antibiotics for associated infection.  Hand-outs provided.    Patient was sent with Keflex and was instructed to follow-up with PCP if no improvement in 5-7 days for continued care and management or sooner if new or worsening symptoms.  She is to return to the ED for persistent and/or worsening symptoms.  Patient expressed understanding of the diagnosis and plan and was discharged home in good condition.    I have reviewed the nursing notes.    I have reviewed the findings,  diagnosis, plan and need for follow up with the patient.    Discharge Medication List as of 11/25/2017  1:37 PM      START taking these medications    Details   cephALEXin (KEFLEX) 500 MG capsule Take 1 capsule (500 mg) by mouth 4 times daily for 7 days, Disp-28 capsule, R-0, E-Prescribe             Final diagnoses:   Ingrowing toenail with infection       11/25/2017   [facility] EMERGENCY DEPARTMENT     Niesha Crews PA-C  11/26/17 [id number]

## 2017-11-25 NOTE — DISCHARGE INSTRUCTIONS
Ingrown Toenail (Excised)  An ingrown toenail occurs when the nail grows sideways into the skin next to the nail. This can cause pain and may lead to an infection with redness, swelling, and sometimes drainage.  The most common cause of an ingrown toenail is trimming your toenails wrong. Most people trim the nails too close to the skin and try to round the nail too tightly around the shape of the toe. When you do this, the nail can grow into the skin of the toe. While it may look nice, your toenail can grow into the skin and cause infection. It is safer to trim the nail to end in a straight line rather than a curve.  Other causes include injury or wearing shoes that are too short or tight. This can cause the same problem that happens when trimming your toenails. Sometimes you are born with a toenail that grows too large for your toe.  The most common symptoms of an ingrown toenail include:    Pain    Redness    Swelling    Drainage  Treatment  It's important to treat an ingrown toenail as soon as you notice there is a problem. If the infection is mild, you may be able to take care of it at home. Home care includes:    Frequent warm water soaks    Keeping the nail clean    Wearing loose, comfortable shoes or open toe sandals  Another method to help the toe heal is to use a small piece of cotton or waxed dental floss to gently lift the corner of the problem nail. Change the cotton or floss frequently, especially if it gets dirty.  If your infection is mild but home care isn't working, or the toenail is getting worse, see your healthcare provider. Signs of worsening infection include:    Swelling    Redness     Pus drainage  In some cases, part of the toenail needs to be removed by your healthcare provider so that the infection can be drained.  If there is a lot of redness and swelling, then an antibiotic may also be used. The redness and pain should go away within 48 hours. It will take about 2 weeks for the exposed  nail bed to become dry and for the swelling to go down.  If only the side of the nail was removed, it will begin to grow back in a few months. To prevent recurrence, sometimes the side of the nail bed may be treated with a strong chemical to prevent the nail from growing back.  Home care  Wound care    Twice a day for the first 3 days, clean and soak the toe as follows:    Soak your foot in a tub of warm water for 5 minutes. Or, hold your toe under a faucet of warm running water for 5 minutes.    Clean any remaining crust away with soap and water using a cotton swab.    Put a small amount of antibiotic ointment on the infected area.    Cover with a bandage until the exposed nail bed is dry and there is no more drainage.    Change the dressing or bandage every time you soak or clean it, or whenever it becomes wet or dirty.    If you were prescribed antibiotics, take them as directed until they are all gone.    While your toe is healing wear comfortable shoes with a lot of toe room. Or wear open-toe sandals.  Medicines    You can take over-the-counter medicine for pain, unless you were given a different pain medicine to use. Note: Talk with your healthcare provider before using these medicines if you have chronic liver or kidney disease, have ever had a stomach ulcer or GI (gastrointestinal) bleeding, or are taking blood thinner medicines.    If you were given antibiotics, take them until they are all gone. It is important to finish the antibiotics even if the wound looks better. This ensures that the infection clears.  Prevention  To prevent ingrown toenails:    Wear shoes that fit well. Avoid shoes that pinch the toes together.    When you trim your toenails, don t cut them too short. Cut straight across at the top and don t round the edges.    Don t use a sharp object to clean under your nail since this might cause an infection.    If the toenail starts to grow into the skin again, put a small piece of cotton under  that side of the nail to help it grow out straight.  Follow-up care  Follow up as advised by your healthcare provider. If the ingrown toenail recurs, follow up with a foot specialist (podiatrist) for nail bed ablation.  When to seek medical care  Call your healthcare provider right away if any of these occur:    Increasing redness, pain, or swelling of the toe    Red streaks in the skin leading away from the wound    Continued pus or fluid drainage for more than 24 hours    Fever of 100.4 F (38 C) or higher, or as directed by your provider  Date Last Reviewed: 11/1/2016 2000-2017 [employer]. [address] [place] [address], [place] [address]. All rights reserved. This information is not intended as a substitute for professional medical care. Always follow your healthcare professional's instructions.

## 2017-11-25 NOTE — ED AVS SNAPSHOT
[facility] Emergency Department    [address] [address]    Phone:  [phone]    Fax:  [phone]                                       Kenya Rubalcava   MRN: [id number]    Department:  [facility] Emergency Department   Date of Visit:  11/25/2017           After Visit Summary Signature Page     I have received my discharge instructions, and my questions have been answered. I have discussed any challenges I see with this plan with the nurse or doctor.    ..........................................................................................................................................  Patient/Patient Representative Signature      ..........................................................................................................................................  Patient Representative Print Name and Relationship to Patient    ..................................................               ................................................  Date                                            Time    ..........................................................................................................................................  Reviewed by Signature/Title    ...................................................              ..............................................  Date                                                            Time

## 2017-11-25 NOTE — ED AVS SNAPSHOT
[facility] Emergency Department    [address] [facility] [address]    Phone:  [phone]    Fax:  [phone]                                       Kenya Rubalcava   MRN: [id number]    Department:  [facility] Emergency Department   Date of Visit:  11/25/2017           Patient Information     Date Of Birth          1960        Your diagnoses for this visit were:     Ingrowing toenail with infection        You were seen by Niesha Crews PA-C.      Follow-up Information     Call Carlie Real MD.    Specialty:  Family Practice    Why:  As needed, For persistent symptoms    Contact information:    [address] [facility] [address]  [phone]          Follow up with [facility] Emergency Department.    Specialty:  EMERGENCY MEDICINE    Why:  As needed, If symptoms worsen    Contact information:    [address] [place] [id number] [phone]    Additional information:    The medical center is located at   [address] [place] [address]. (between [address]   [address] in [place], [place] miles north   of [place]).        Discharge Instructions         Ingrown Toenail (Excised)  An ingrown toenail occurs when the nail grows sideways into the skin next to the nail. This can cause pain and may lead to an infection with redness, swelling, and sometimes drainage.  The most common cause of an ingrown toenail is trimming your toenails wrong. Most people trim the nails too close to the skin and try to round the nail too tightly around the shape of the toe. When you do this, the nail can grow into the skin of the toe. While it may look nice, your toenail can grow into the skin and cause infection. It is safer to trim the nail to end in a straight line rather than a curve.  Other causes include injury or wearing shoes that are too short or tight. This can cause the same problem that happens when trimming your toenails. Sometimes you are born with a toenail that grows too large  for your toe.  The most common symptoms of an ingrown toenail include:    Pain    Redness    Swelling    Drainage  Treatment  It's important to treat an ingrown toenail as soon as you notice there is a problem. If the infection is mild, you may be able to take care of it at home. Home care includes:    Frequent warm water soaks    Keeping the nail clean    Wearing loose, comfortable shoes or open toe sandals  Another method to help the toe heal is to use a small piece of cotton or waxed dental floss to gently lift the corner of the problem nail. Change the cotton or floss frequently, especially if it gets dirty.  If your infection is mild but home care isn't working, or the toenail is getting worse, see your healthcare provider. Signs of worsening infection include:    Swelling    Redness     Pus drainage  In some cases, part of the toenail needs to be removed by your healthcare provider so that the infection can be drained.  If there is a lot of redness and swelling, then an antibiotic may also be used. The redness and pain should go away within 48 hours. It will take about 2 weeks for the exposed nail bed to become dry and for the swelling to go down.  If only the side of the nail was removed, it will begin to grow back in a few months. To prevent recurrence, sometimes the side of the nail bed may be treated with a strong chemical to prevent the nail from growing back.  Home care  Wound care    Twice a day for the first 3 days, clean and soak the toe as follows:    Soak your foot in a tub of warm water for 5 minutes. Or, hold your toe under a faucet of warm running water for 5 minutes.    Clean any remaining crust away with soap and water using a cotton swab.    Put a small amount of antibiotic ointment on the infected area.    Cover with a bandage until the exposed nail bed is dry and there is no more drainage.    Change the dressing or bandage every time you soak or clean it, or whenever it becomes wet or  dirty.    If you were prescribed antibiotics, take them as directed until they are all gone.    While your toe is healing wear comfortable shoes with a lot of toe room. Or wear open-toe sandals.  Medicines    You can take over-the-counter medicine for pain, unless you were given a different pain medicine to use. Note: Talk with your healthcare provider before using these medicines if you have chronic liver or kidney disease, have ever had a stomach ulcer or GI (gastrointestinal) bleeding, or are taking blood thinner medicines.    If you were given antibiotics, take them until they are all gone. It is important to finish the antibiotics even if the wound looks better. This ensures that the infection clears.  Prevention  To prevent ingrown toenails:    Wear shoes that fit well. Avoid shoes that pinch the toes together.    When you trim your toenails, don t cut them too short. Cut straight across at the top and don t round the edges.    Don t use a sharp object to clean under your nail since this might cause an infection.    If the toenail starts to grow into the skin again, put a small piece of cotton under that side of the nail to help it grow out straight.  Follow-up care  Follow up as advised by your healthcare provider. If the ingrown toenail recurs, follow up with a foot specialist (podiatrist) for nail bed ablation.  When to seek medical care  Call your healthcare provider right away if any of these occur:    Increasing redness, pain, or swelling of the toe    Red streaks in the skin leading away from the wound    Continued pus or fluid drainage for more than 24 hours    Fever of 100.4 F (38 C) or higher, or as directed by your provider  Date Last Reviewed: 11/1/2016 2000-2017 [employer]. [address] [place] [address]. All rights reserved. This information is not intended as a substitute for professional medical care. Always follow your healthcare professional's  instructions.          24 Hour Appointment Hotline       To make an appointment at any [facility], call [phone] ([phone]). If you don't have a family doctor or clinic, we will help you find one. [facility] clinics are conveniently located to serve the needs of you and your family.             Review of your medicines      START taking        Dose / Directions Last dose taken    cephALEXin 500 MG capsule   Commonly known as:  KEFLEX   Dose:  500 mg   Quantity:  28 capsule        Take 1 capsule (500 mg) by mouth 4 times daily for 7 days   Refills:  0          Our records show that you are taking the medicines listed below. If these are incorrect, please call your family doctor or clinic.        Dose / Directions Last dose taken    aspirin 325 MG EC tablet        one tablet by mouth daily (buffered)   Refills:  0        atenolol 50 MG tablet   Commonly known as:  TENORMIN   Quantity:  90 tablet        TAKE 1 TABLET BY MOUTH ONCE DAILY   Refills:  2        betamethasone dipropionate 0.05 % ointment   Commonly known as:  DIPROSONE   Quantity:  45 g        Apply sparingly to affected area twice daily as needed.  Do not apply to face.   Refills:  0        * diazepam 5 MG tablet   Commonly known as:  VALIUM   Quantity:  60 tablet        Take 1 as needed for menieres daily   Refills:  0        * diazepam 5 MG tablet   Commonly known as:  VALIUM   Dose:  5 mg   Quantity:  60 tablet        Take 1 tablet (5 mg) by mouth every 12 hours as needed for anxiety or sleep   Refills:  0        DIPHENHYDRAMINE HCL PO   Dose:  25 mg        Take 25 mg by mouth daily as needed (for Menieres disease)   Refills:  0        fluticasone 50 MCG/ACT spray   Commonly known as:  FLONASE   Dose:  1-2 spray   Quantity:  3 Package        Spray 1-2 sprays into both nostrils daily   Refills:  1        guaiFENesin-codeine 100-10 MG/5ML Soln solution   Commonly known as:  ROBITUSSIN AC   Dose:  1-2 tsp.   Quantity:  120 mL        Take  5-10 mLs by mouth every 4 hours as needed   Refills:  0        meclizine 25 MG tablet   Commonly known as:  ANTIVERT   Dose:  50 mg   Quantity:  60 tablet        Take 2 tablets by mouth 2 times daily as needed.   Refills:  0        pravastatin 40 MG tablet   Commonly known as:  PRAVACHOL   Quantity:  30 tablet        TAKE ONE TABLET BY MOUTH EVERY DAY AT BEDTIME   Refills:  0        predniSONE 20 MG tablet   Commonly known as:  DELTASONE   Quantity:  20 tablet        Take 3 tabs (60 mg) orally daily for 3 days, 2 tabs (40 mg) daily for 3 days, 1 tab (20 mg) daily for 3 days, then 1/2 tab (10 mg) for 3 day   Refills:  0        prochlorperazine 10 MG tablet   Commonly known as:  COMPAZINE   Dose:  5 mg   Quantity:  10 tablet        Take 0.5 tablets (5 mg) by mouth every 6 hours as needed for nausea or vomiting   Refills:  1        traZODone 50 MG tablet   Commonly known as:  DESYREL   Quantity:  30 tablet        TAKE ONE TABLET BY MOUTH AT BEDTIME AS NEEDED FOR SLEEP   Refills:  0        triamterene-hydrochlorothiazide 37.5-25 MG per capsule   Commonly known as:  DYAZIDE   Quantity:  90 capsule        TAKE 1 CAPSULE BY MOUTH EVERY MORNING   Refills:  0        venlafaxine 150 MG 24 hr capsule   Commonly known as:  EFFEXOR-XR   Quantity:  90 capsule        TAKE ONE CAPSULE BY MOUTH ONCE DAILY   Refills:  0        zolpidem 10 MG tablet   Commonly known as:  AMBIEN   Quantity:  30 tablet        TAKE 1 TABLET BY MOUTH NIGHTLY AT BEDTIME AS NEEDED FOR SLEEP   Refills:  3        * Notice:  This list has 2 medication(s) that are the same as other medications prescribed for you. Read the directions carefully, and ask your doctor or other care provider to review them with you.            Prescriptions were sent or printed at these locations (1 Prescription)                   [facility] [place], [place] - [address] [facility]   [address] [facility] [address]    Telephone:  [phone]   Fax:  [phone]    Hours:                  E-Prescribed (1 of 1)         cephALEXin (KEFLEX) 500 MG capsule                Orders Needing Specimen Collection     None      Pending Results     No orders found from 11/23/2017 to 11/26/2017.            Pending Culture Results     No orders found from 11/23/2017 to 11/26/2017.            Pending Results Instructions     If you had any lab results that were not finalized at the time of your Discharge, you can call the ED Lab Result RN at [phone]. You will be contacted by this team for any positive Lab results or changes in treatment. The nurses are available 7 days a week from 10A to 6:30P.  You can leave a message 24 hours per day and they will return your call.        Test Results From Your Hospital Stay               Thank you for choosing [facility]       Thank you for choosing [facility] for your care. Our goal is always to provide you with excellent care. Hearing back from our patients is one way we can continue to improve our services. Please take a few minutes to complete the written survey that you may receive in the mail after you visit with us. Thank you!        [employer] Information     [employer] gives you secure access to your electronic health record. If you see a primary care provider, you can also send messages to your care team and make appointments. If you have questions, please call your primary care clinic.  If you do not have a primary care provider, please call [phone] and they will assist you.        Care EveryWhere ID     This is your Care EveryWhere ID. This could be used by other organizations to access your [facility] medical records  [id number]        Equal Access to Services     SHILOH AMBROSE : Hadii myriam Menendez, saranya goetz, qashashank kaalmada yuan, mary timmons. So [facility] [phone].    ATENCIÓN: Si habla español, tiene a avalos disposición servicios gratuitos de asistencia lingüística. Llame al  [phone].    We comply with applicable federal civil rights laws and [place] laws. We do not discriminate on the basis of race, color, national origin, age, disability, sex, sexual orientation, or gender identity.            After Visit Summary       This is your record. Keep this with you and show to your community pharmacist(s) and doctor(s) at your next visit.

## 2017-11-26 ASSESSMENT — ENCOUNTER SYMPTOMS
NEUROLOGICAL NEGATIVE: 1
CONSTITUTIONAL NEGATIVE: 1

## 2017-11-27 RX ORDER — TRIAMTERENE AND HYDROCHLOROTHIAZIDE 37.5; 25 MG/1; MG/1
CAPSULE ORAL
Qty: 90 CAPSULE | Refills: 0 | Status: SHIPPED | OUTPATIENT
Start: 2017-11-27 | End: 2017-12-14

## 2017-11-27 NOTE — TELEPHONE ENCOUNTER
Medication is still pending. Medication is being filled for 1 time refill only due to:  Patient needs labs cmp; lipids. Future labs ordered yes.   Mark Cosme RN

## 2017-12-02 DIAGNOSIS — E78.5 HYPERLIPIDEMIA LDL GOAL <130: ICD-10-CM

## 2017-12-04 DIAGNOSIS — E78.5 HYPERLIPIDEMIA LDL GOAL <130: ICD-10-CM

## 2017-12-04 DIAGNOSIS — G47.00 PERSISTENT INSOMNIA: ICD-10-CM

## 2017-12-04 RX ORDER — PRAVASTATIN SODIUM 40 MG
40 TABLET ORAL AT BEDTIME
Qty: 30 TABLET | Refills: 0 | Status: SHIPPED | OUTPATIENT
Start: 2017-12-04 | End: 2017-12-14

## 2017-12-05 RX ORDER — PRAVASTATIN SODIUM 40 MG
TABLET ORAL
Qty: 30 TABLET | Refills: 0 | Status: SHIPPED | OUTPATIENT
Start: 2017-12-05 | End: 2017-12-14

## 2017-12-05 RX ORDER — TRAZODONE HYDROCHLORIDE 50 MG/1
TABLET, FILM COATED ORAL
Qty: 30 TABLET | Refills: 0 | Status: SHIPPED | OUTPATIENT
Start: 2017-12-05 | End: 2017-12-14

## 2017-12-05 NOTE — TELEPHONE ENCOUNTER
Medication is being filled for 1 time refill only due to:  Patient needs cmp and lipids.   Mark Cosme RN

## 2017-12-05 NOTE — TELEPHONE ENCOUNTER
Routing refill request to provider for review/approval because:  Drug not on the [facility] refill protocol   Mark Cosme RN

## 2017-12-07 DIAGNOSIS — F41.9 ANXIETY: ICD-10-CM

## 2017-12-07 NOTE — TELEPHONE ENCOUNTER
Left pt a message that she is due for an appointment in clinic with Dr. Real. Due for a PHQ-9. Please advise on refill.    Miriam MCNEIL RN

## 2017-12-08 RX ORDER — VENLAFAXINE HYDROCHLORIDE 150 MG/1
CAPSULE, EXTENDED RELEASE ORAL
Qty: 90 CAPSULE | Refills: 0 | Status: SHIPPED | OUTPATIENT
Start: 2017-12-08 | End: 2017-12-14

## 2017-12-08 NOTE — TELEPHONE ENCOUNTER
I did refill her med so she is not out. She does need an office visit in the near future. Carlie Real M.D.;

## 2017-12-14 ENCOUNTER — OFFICE VISIT (OUTPATIENT)
Dept: FAMILY MEDICINE | Facility: CLINIC | Age: 57
End: 2017-12-14
Payer: COMMERCIAL

## 2017-12-14 VITALS
HEIGHT: 63 IN | DIASTOLIC BLOOD PRESSURE: 68 MMHG | HEART RATE: 80 BPM | TEMPERATURE: 97.2 F | BODY MASS INDEX: 36.86 KG/M2 | SYSTOLIC BLOOD PRESSURE: 114 MMHG | WEIGHT: 208 LBS

## 2017-12-14 DIAGNOSIS — E78.5 HYPERLIPIDEMIA LDL GOAL <130: ICD-10-CM

## 2017-12-14 DIAGNOSIS — H81.09 MENIERE DISEASE, UNSPECIFIED LATERALITY: ICD-10-CM

## 2017-12-14 DIAGNOSIS — H81.01 MENIERE'S DISEASE, RIGHT: ICD-10-CM

## 2017-12-14 DIAGNOSIS — G47.00 PERSISTENT INSOMNIA: ICD-10-CM

## 2017-12-14 DIAGNOSIS — F41.9 ANXIETY: ICD-10-CM

## 2017-12-14 DIAGNOSIS — Z12.31 ENCOUNTER FOR SCREENING MAMMOGRAM FOR BREAST CANCER: ICD-10-CM

## 2017-12-14 DIAGNOSIS — L40.9 PSORIASIS: ICD-10-CM

## 2017-12-14 DIAGNOSIS — I10 ESSENTIAL HYPERTENSION, BENIGN: ICD-10-CM

## 2017-12-14 DIAGNOSIS — Z00.00 ROUTINE GENERAL MEDICAL EXAMINATION AT A HEALTH CARE FACILITY: Primary | ICD-10-CM

## 2017-12-14 LAB
ALBUMIN SERPL-MCNC: 3.9 G/DL (ref 3.4–5)
ALP SERPL-CCNC: 77 U/L (ref 40–150)
ALT SERPL W P-5'-P-CCNC: 54 U/L (ref 0–50)
ANION GAP SERPL CALCULATED.3IONS-SCNC: 10 MMOL/L (ref 3–14)
AST SERPL W P-5'-P-CCNC: 49 U/L (ref 0–45)
BILIRUB SERPL-MCNC: 0.4 MG/DL (ref 0.2–1.3)
BUN SERPL-MCNC: 20 MG/DL (ref 7–30)
CALCIUM SERPL-MCNC: 9.5 MG/DL (ref 8.5–10.1)
CHLORIDE SERPL-SCNC: 101 MMOL/L (ref 94–109)
CHOLEST SERPL-MCNC: 158 MG/DL
CO2 SERPL-SCNC: 26 MMOL/L (ref 20–32)
CREAT SERPL-MCNC: 0.72 MG/DL (ref 0.52–1.04)
GFR SERPL CREATININE-BSD FRML MDRD: 83 ML/MIN/1.7M2
GLUCOSE SERPL-MCNC: 106 MG/DL (ref 70–99)
HDLC SERPL-MCNC: 34 MG/DL
LDLC SERPL CALC-MCNC: 72 MG/DL
NONHDLC SERPL-MCNC: 124 MG/DL
POTASSIUM SERPL-SCNC: 3.6 MMOL/L (ref 3.4–5.3)
PROT SERPL-MCNC: 7.6 G/DL (ref 6.8–8.8)
SODIUM SERPL-SCNC: 137 MMOL/L (ref 133–144)
TRIGL SERPL-MCNC: 262 MG/DL

## 2017-12-14 PROCEDURE — 99213 OFFICE O/P EST LOW 20 MIN: CPT | Mod: 25 | Performed by: FAMILY MEDICINE

## 2017-12-14 PROCEDURE — 99396 PREV VISIT EST AGE 40-64: CPT | Performed by: FAMILY MEDICINE

## 2017-12-14 PROCEDURE — 80061 LIPID PANEL: CPT | Performed by: FAMILY MEDICINE

## 2017-12-14 PROCEDURE — 80053 COMPREHEN METABOLIC PANEL: CPT | Performed by: FAMILY MEDICINE

## 2017-12-14 PROCEDURE — 36415 COLL VENOUS BLD VENIPUNCTURE: CPT | Performed by: FAMILY MEDICINE

## 2017-12-14 RX ORDER — FLUOCINOLONE ACETONIDE 0.11 MG/ML
OIL TOPICAL 2 TIMES DAILY PRN
Qty: 120 ML | Refills: 3 | Status: SHIPPED | OUTPATIENT
Start: 2017-12-14 | End: 2020-07-29

## 2017-12-14 RX ORDER — TRAZODONE HYDROCHLORIDE 50 MG/1
50-150 TABLET, FILM COATED ORAL AT BEDTIME
Qty: 270 TABLET | Refills: 1 | Status: SHIPPED | OUTPATIENT
Start: 2017-12-14 | End: 2018-06-16

## 2017-12-14 RX ORDER — DIAZEPAM 5 MG
TABLET ORAL
Qty: 60 TABLET | Refills: 0 | Status: CANCELLED | OUTPATIENT
Start: 2017-12-14

## 2017-12-14 RX ORDER — VENLAFAXINE HYDROCHLORIDE 150 MG/1
CAPSULE, EXTENDED RELEASE ORAL
Qty: 90 CAPSULE | Refills: 1 | Status: SHIPPED | OUTPATIENT
Start: 2017-12-14 | End: 2018-08-28

## 2017-12-14 RX ORDER — ZOLPIDEM TARTRATE 10 MG/1
TABLET ORAL
Qty: 30 TABLET | Refills: 0 | Status: SHIPPED | OUTPATIENT
Start: 2017-12-14 | End: 2018-12-05 | Stop reason: ALTCHOICE

## 2017-12-14 RX ORDER — TRIAMTERENE AND HYDROCHLOROTHIAZIDE 37.5; 25 MG/1; MG/1
CAPSULE ORAL
Qty: 90 CAPSULE | Refills: 0 | Status: SHIPPED | OUTPATIENT
Start: 2017-12-14 | End: 2018-05-31

## 2017-12-14 RX ORDER — DIAZEPAM 5 MG
5 TABLET ORAL EVERY 12 HOURS PRN
Qty: 60 TABLET | Refills: 3 | Status: SHIPPED | OUTPATIENT
Start: 2017-12-14 | End: 2018-12-05

## 2017-12-14 RX ORDER — PRAVASTATIN SODIUM 40 MG
TABLET ORAL
Qty: 90 TABLET | Refills: 3 | Status: SHIPPED | OUTPATIENT
Start: 2017-12-14 | End: 2018-12-05

## 2017-12-14 NOTE — PROGRESS NOTES
SUBJECTIVE:   CC: Kenya Rubalcava is an 57 year old woman who presents for preventive health visit.     Healthy Habits:    Do you get at least three servings of calcium containing foods daily (dairy, green leafy vegetables, etc.)? no, taking calcium and/or vitamin D supplement: no    Amount of exercise or daily activities, outside of work: 0 day(s) per week    Problems taking medications regularly No    Medication side effects: No    Have you had an eye exam in the past two years? no    Do you see a dentist twice per year? yes    Do you have sleep apnea, excessive snoring or daytime drowsiness?no    The menieres is really acting up for her she tries not to take the Valium too often she can take this and it actually does come down the dizziness she can do this even while she is at school because it does not sedate her very much but she is concerned about getting to used to taking this.  She is taking the Ambien and that does help somewhat with sleep but she has found herself sleepwalking kind of with this.  I asked her not to take this anymore except on very rare occasions when she cannot quiet her mind.  I also asked her how she was taking her trazodone she is only been taking 1 of these has not ever increased it she does not find it all that helpful so we reviewed the use of the trazodone and how to increase it.  She still has 3 kidney stones but she has not had those past that all she still paying off all of her bills from last year with the one large kidney stone.      Today's PHQ-2 Score:   PHQ-2 ( 1999 Pf[employer]) 12/14/2017 8/17/2016   Q1: Little interest or pleasure in doing things 0 0   Q2: Feeling down, depressed or hopeless 0 0   PHQ-2 Score 0 0   Q1: Little interest or pleasure in doing things - -   Q2: Feeling down, depressed or hopeless - -   PHQ-2 Score - -       Abuse: Current or Past(Physical, Sexual or Emotional)- No  Do you feel safe in your environment - Yes  Social History   Substance Use Topics      Smoking status: Never Smoker     Smokeless tobacco: Never Used     Alcohol use 0.0 oz/week     0 Standard drinks or equivalent per week      Comment: rare      If you drink alcohol do you typically have >3 drinks per day or >7 drinks per week? No                     Reviewed orders with patient.  Reviewed health maintenance and updated orders accordingly - Yes  BP Readings from Last 3 Encounters:   12/14/17 114/68   11/25/17 125/67   06/14/17 108/61    Wt Readings from Last 3 Encounters:   12/14/17 208 lb (94.3 kg)   06/14/17 202 lb 6.4 oz (91.8 kg)   12/14/16 194 lb 1.6 oz (88 kg)                  Patient Active Problem List   Diagnosis     Anxiety disorder due to medical condition     Essential hypertension, benign     Sensorineural hearing loss     Peripheral vertigo     Allergic rhinitis     Symptomatic menopausal or female climacteric states     Psoriasis     Hyperlipidemia LDL goal <130     Kidney stones     Hypertriglyceridemia     Meniere disease     Anxiety     Kidney stone     Past Surgical History:   Procedure Laterality Date     CARPAL TUNNEL RELEASE RT/LT  08/2008    Left      COLONOSCOPY  8/7/2012    Procedure: COLONOSCOPY;  Colonoscopy;  Surgeon: Kailyn Lopez MD;  Location: [facility]     CYSTOSCOPY, RETROGRADES, INSERT STENT URETER(S), COMBINED Left 8/19/2016    Procedure: COMBINED CYSTOSCOPY, RETROGRADES, INSERT STENT URETER(S);  Surgeon: Terence Carpenter MD;  Location:  OR     esteem implant  06/23/2011    Hearing implant right ear.     HYSTERECTOMY, PAP NO LONGER INDICATED  12/2004    Vaginal hysterectomy     LAPAROSCOPIC ABLATION ENDOMETRIOSIS  07/2004     LASER HOLMIUM LITHOTRIPSY URETER(S), INSERT STENT, COMBINED Left 9/12/2016    Procedure: COMBINED CYSTOSCOPY, URETEROSCOPY, LASER HOLMIUM LITHOTRIPSY URETER(S), INSERT STENT;  Surgeon: Kailyn Dozier MD;  Location:  OR     LEFT wrist surgery       Sinus surgery with repair of deviated septum       TUBAL  LIGATION         Social History   Substance Use Topics     Smoking status: Never Smoker     Smokeless tobacco: Never Used     Alcohol use 0.0 oz/week     0 Standard drinks or equivalent per week      Comment: rare      Family History   Problem Relation Age of Onset     HEART DISEASE Brother      Hypertension Brother      Asthma Daughter      HEART DISEASE Brother      Hypertension Brother      C.A.D. Father      MI      Hypertension Father      Lipids Father 50     C.A.D. Paternal Aunt      MI-50s     CANCER Maternal Aunt      Ovarian CA     CEREBROVASCULAR DISEASE No family hx of      Breast Cancer No family hx of      Cancer - colorectal No family hx of      Prostate Cancer No family hx of                Patient over age 50, mutual decision to screen reflected in health maintenance.      Pertinent mammograms are reviewed under the imaging tab.  History of abnormal Pap smear: NO - age 30-65 PAP every 5 years with negative HPV co-testing recommended    Reviewed and updated as needed this visit by clinical staffTobacco  Meds  Med Hx  Surg Hx  Fam Hx  Soc Hx        Reviewed and updated as needed this visit by Provider        Past Medical History:   Diagnosis Date     Abnormal Papanicolaou smear of cervix and cervical HPV 1986     Anxiety      Anxiety disorder in conditions classified elsewhere      Calculus of kidney 08/11/1988    Calcium renal stone, oxylate     Calculus of ureter 06/30/1985    LEFT urteral stone     Excessive or frequent menstruation 07/07/2003     Labyrinthitis, unspecified      Lump or mass in breast 10/27/1999    RIGHT breast mass     Variants of migraine, not elsewhere classified, without mention of intractable migraine without mention of status migrainosus 02/07/2002    she does have an itchy spot between behind on on her scalp in the back this patches been flaring for the last few months she does have a diagnosis of psoriasis on her chart this patches flaky itchy thickened consistent with  "this  ROS:  C: NEGATIVE for fever, chills, change in weight  I: NEGATIVE for worrisome rashes, moles or lesions  E: NEGATIVE for vision changes or irritation  ENT: NEGATIVE for ear, mouth and throat problems  R: NEGATIVE for significant cough or SOB  B: NEGATIVE for masses, tenderness or discharge  CV: NEGATIVE for chest pain, palpitations or peripheral edema  GI: NEGATIVE for nausea, abdominal pain, heartburn, or change in bowel habits  : NEGATIVE for unusual urinary or vaginal symptoms. No vaginal bleeding.  M: NEGATIVE for significant arthralgias or myalgia  N: NEGATIVE for weakness, dizziness or paresthesias  P: NEGATIVE for changes in mood or affect     OBJECTIVE:   /68 (BP Location: Left arm, Patient Position: Chair, Cuff Size: Adult Regular)  Pulse 80  Temp 97.2  F (36.2  C) (Tympanic)  Ht 5' 3\" (1.6 m)  Wt 208 lb (94.3 kg)  BMI 36.85 kg/m2  EXAM:  GENERAL: healthy, alert and no distress  HENT: ear canals and TM's normal, nose and mouth without ulcers or lesions  NECK: no adenopathy, no asymmetry, masses, or scars and thyroid normal to palpation  RESP: lungs clear to auscultation - no rales, rhonchi or wheezes  BREAST: normal without masses, tenderness or nipple discharge and no palpable axillary masses or adenopathy  CV: regular rate and rhythm, normal S1 S2, no S3 or S4, no murmur, click or rub, no peripheral edema and peripheral pulses strong  ABDOMEN: soft, nontender, no hepatosplenomegaly, no masses and bowel sounds normal  MS: no gross musculoskeletal defects noted, no edema  SKIN: right base of skull there is shiny silver thickened plaque with red base 3 cm across with excoriations  NEURO: Normal strength and tone, mentation intact and speech normal  MENTAL STATUS EXAM:    1. Clinical observations: Nate was clean and was adequately groomed. Nate's emotional presentation was open and cooperative. She spoke clear and articulate. She maintained good eye contact and she was cooperative in " answering questions.   2. She appeared to be well-oriented in all spheres with coherent, logical, goal directed and relevent thinking.   3. Thought content: She denies no abnormal thought process.   4. Affect and mood: Rees affect is described as normal/appropriate and her emotional attitude was open and cooperative. She reports the following sypmtoms: difficulty sleeping, difficulty concentrating, less energy than usual and feeling fatiqued.    5. Sensorium and cognition: She was in contact with reality and oriented to time, place and person.  She demonstrated no impairment in immediate, recent, or remote memory. Her insight was adequate and her  intelligence appeared to be average.        ASSESSMENT/PLAN:   1. Routine general medical examination at a health care facility      2. Anxiety    - venlafaxine (EFFEXOR-XR) 150 MG 24 hr capsule; TAKE ONE CAPSULE BY MOUTH ONCE DAILY  Dispense: 90 capsule; Refill: 1    3. Hyperlipidemia LDL goal <130    - Lipid panel reflex to direct LDL Fasting  - pravastatin (PRAVACHOL) 40 MG tablet; TAKE ONE TABLET BY MOUTH EVERY DAY AT BEDTIME  Dispense: 90 tablet; Refill: 3  - triamterene-hydrochlorothiazide (DYAZIDE) 37.5-25 MG per capsule; take 1 capsule by mouth once daily in the morning  Dispense: 90 capsule; Refill: 0  - Comprehensive metabolic panel    4. Persistent insomnia  Do not use the Ambien except on rare questions please titrate up the trazodone if this is not working please let me know and we can try a different one you can send me any visit  - traZODone (DESYREL) 50 MG tablet; Take 1-3 tablets ( mg) by mouth At Bedtime  Dispense: 270 tablet; Refill: 1  - zolpidem (AMBIEN) 10 MG tablet; TAKE 1 TABLET BY MOUTH NIGHTLY AT BEDTIME AS NEEDED FOR SLEEP  Dispense: 30 tablet; Refill: 0  - Comprehensive metabolic panel    5. Meniere disease, unspecified laterality    - diazepam (VALIUM) 5 MG tablet; Take 1 tablet (5 mg) by mouth every 12 hours as needed for anxiety or  "sleep  Dispense: 60 tablet; Refill: 3    6. Meniere's disease, right      7. Encounter for screening mammogram for breast cancer    - MA Screening Digital Bilateral; Future    8. Essential hypertension, benign  Well-controlled she is going to keep working on her weight  - Comprehensive metabolic panel    9. Psoriasis  We will have her use some Derma-Smoothe for this she already has some triamcinolone ointment at home that she can use over her elbows when they flare in the winter  - Fluocinolone Acetonide Scalp 0.01 % OIL oil; Apply topically 2 times daily as needed  Dispense: 120 mL; Refill: 3    COUNSELING:   Reviewed preventive health counseling, as reflected in patient instructions         reports that she has never smoked. She has never used smokeless tobacco.    Estimated body mass index is 36.85 kg/(m^2) as calculated from the following:    Height as of this encounter: 5' 3\" (1.6 m).    Weight as of this encounter: 208 lb (94.3 kg).   Weight management plan: Discussed healthy diet and exercise guidelines and patient will follow up in 12 months in clinic to re-evaluate.    Counseling Resources:  ATP IV Guidelines  Pooled Cohorts Equation Calculator  Breast Cancer Risk Calculator  FRAX Risk Assessment  ICSI Preventive Guidelines  Dietary Guidelines for Americans, 2010  USDA's MyPlate  ASA Prophylaxis  Lung CA Screening    Carlie Real MD  [facility]  "

## 2017-12-14 NOTE — PATIENT INSTRUCTIONS
Preventive Health Recommendations  Female Ages 50 - 64    Yearly exam: See your health care provider every year in order to  o Review health changes.   o Discuss preventive care.    o Review your medicines if your doctor has prescribed any.      Get a Pap test every three years (unless you have an abnormal result and your provider advises testing more often).    If you get Pap tests with HPV test, you only need to test every 5 years, unless you have an abnormal result.     You do not need a Pap test if your uterus was removed (hysterectomy) and you have not had cancer.    You should be tested each year for STDs (sexually transmitted diseases) if you're at risk.     Have a mammogram every 1 to 2 years.    Have a colonoscopy at age 50, or have a yearly FIT test (stool test). These exams screen for colon cancer.      Have a cholesterol test every 5 years, or more often if advised.    Have a diabetes test (fasting glucose) every three years. If you are at risk for diabetes, you should have this test more often.     If you are at risk for osteoporosis (brittle bone disease), think about having a bone density scan (DEXA).    Shots: Get a flu shot each year. Get a tetanus shot every 10 years.    Nutrition:     Eat at least 5 servings of fruits and vegetables each day.    Eat whole-grain bread, whole-wheat pasta and brown rice instead of white grains and rice.    Talk to your provider about Calcium and Vitamin D.     Lifestyle    Exercise at least 150 minutes a week (30 minutes a day, 5 days a week). This will help you control your weight and prevent disease.    Limit alcohol to one drink per day.    No smoking.     Wear sunscreen to prevent skin cancer.     See your dentist every six months for an exam and cleaning.    See your eye doctor every 1 to 2 years.        Try the trazodone  Start with 1 tablet an hour before bedtime.   You can increase by 1/2 tablet every few nights to a maximum of 3 tablets.  If you are groggy  the next day after changing doses try cutting back to the dose you were taking before.   Let me know if you need to try something else or if you are having side effects from this.

## 2017-12-14 NOTE — MR AVS SNAPSHOT
After Visit Summary   12/14/2017    Kenya Rubalcava    MRN: [id number]           Patient Information     Date Of Birth          1960        Visit Information        Provider Department      12/14/2017 8:00 AM Carlie Real MD [facility]        Today's Diagnoses     Routine general medical examination at a health care facility    -  1    Anxiety        Hyperlipidemia LDL goal <130        Persistent insomnia        Meniere disease, unspecified laterality        Meniere's disease, right        Encounter for screening mammogram for breast cancer        Essential hypertension, benign        Psoriasis          Care Instructions      Preventive Health Recommendations  Female Ages 50 - 64    Yearly exam: See your health care provider every year in order to  o Review health changes.   o Discuss preventive care.    o Review your medicines if your doctor has prescribed any.      Get a Pap test every three years (unless you have an abnormal result and your provider advises testing more often).    If you get Pap tests with HPV test, you only need to test every 5 years, unless you have an abnormal result.     You do not need a Pap test if your uterus was removed (hysterectomy) and you have not had cancer.    You should be tested each year for STDs (sexually transmitted diseases) if you're at risk.     Have a mammogram every 1 to 2 years.    Have a colonoscopy at age 50, or have a yearly FIT test (stool test). These exams screen for colon cancer.      Have a cholesterol test every 5 years, or more often if advised.    Have a diabetes test (fasting glucose) every three years. If you are at risk for diabetes, you should have this test more often.     If you are at risk for osteoporosis (brittle bone disease), think about having a bone density scan (DEXA).    Shots: Get a flu shot each year. Get a tetanus shot every 10 years.    Nutrition:     Eat at least 5 servings of fruits and vegetables each  day.    Eat whole-grain bread, whole-wheat pasta and brown rice instead of white grains and rice.    Talk to your provider about Calcium and Vitamin D.     Lifestyle    Exercise at least 150 minutes a week (30 minutes a day, 5 days a week). This will help you control your weight and prevent disease.    Limit alcohol to one drink per day.    No smoking.     Wear sunscreen to prevent skin cancer.     See your dentist every six months for an exam and cleaning.    See your eye doctor every 1 to 2 years.        Try the trazodone  Start with 1 tablet an hour before bedtime.   You can increase by 1/2 tablet every few nights to a maximum of 3 tablets.  If you are groggy the next day after changing doses try cutting back to the dose you were taking before.   Let me know if you need to try something else or if you are having side effects from this.             Follow-ups after your visit        Future tests that were ordered for you today     Open Future Orders        Priority Expected Expires Ordered    MA Screening Digital Bilateral Routine  12/14/2018 12/14/2017            Who to contact     Normal or non-critical lab and imaging results will be communicated to you by [employer], letter or phone within 4 business days after the clinic has received the results. If you do not hear from us within 7 days, please contact the clinic through [employer] or phone. If you have a critical or abnormal lab result, we will notify you by phone as soon as possible.  Submit refill requests through Innov[employer] or call your pharmacy and they will forward the refill request to us. Please allow 3 business days for your refill to be completed.          If you need to speak with a  for additional information , please call: [phone]             Additional Information About Your Visit        Innovid Information     In[employer]id gives you secure access to your electronic health record. If you see a primary care provider, you can also send  "messages to your care team and make appointments. If you have questions, please call your primary care clinic.  If you do not have a primary care provider, please call [phone] and they will assist you.        Care EveryWhere ID     This is your Care EveryWhere ID. This could be used by other organizations to access your [facility] medical records  [id number]        Your Vitals Were     Pulse Temperature Height BMI (Body Mass Index)          80 97.2  F (36.2  C) (Tympanic) 5' 3\" (1.6 m) 36.85 kg/m2         Blood Pressure from Last 3 Encounters:   12/14/17 114/68   11/25/17 125/67   06/14/17 108/61    Weight from Last 3 Encounters:   12/14/17 208 lb (94.3 kg)   06/14/17 202 lb 6.4 oz (91.8 kg)   12/14/16 194 lb 1.6 oz (88 kg)              We Performed the Following     Comprehensive metabolic panel     Lipid panel reflex to direct LDL Fasting          Today's Medication Changes          These changes are accurate as of: 12/14/17  9:01 AM.  If you have any questions, ask your nurse or doctor.               Start taking these medicines.        Dose/Directions    Fluocinolone Acetonide Scalp 0.01 % Oil oil   Used for:  Psoriasis   Started by:  Carlie Real MD        Apply topically 2 times daily as needed   Quantity:  120 mL   Refills:  3         These medicines have changed or have updated prescriptions.        Dose/Directions    pravastatin 40 MG tablet   Commonly known as:  PRAVACHOL   This may have changed:  See the new instructions.   Used for:  Hyperlipidemia LDL goal <130   Changed by:  Carlie Real MD        TAKE ONE TABLET BY MOUTH EVERY DAY AT BEDTIME   Quantity:  90 tablet   Refills:  3       traZODone 50 MG tablet   Commonly known as:  DESYREL   This may have changed:  See the new instructions.   Used for:  Persistent insomnia   Changed by:  Carlie Real MD        Dose:   mg   Take 1-3 tablets ( mg) by mouth At Bedtime   Quantity:  270 tablet   Refills:  1       " triamterene-hydrochlorothiazide 37.5-25 MG per capsule   Commonly known as:  DYAZIDE   This may have changed:  See the new instructions.   Used for:  Hyperlipidemia LDL goal <130   Changed by:  Carlie Real MD        take 1 capsule by mouth once daily in the morning   Quantity:  90 capsule   Refills:  0       venlafaxine 150 MG 24 hr capsule   Commonly known as:  EFFEXOR-XR   This may have changed:  See the new instructions.   Used for:  Anxiety   Changed by:  Carlie Real MD        TAKE ONE CAPSULE BY MOUTH ONCE DAILY   Quantity:  90 capsule   Refills:  1            Where to get your medicines      These medications were sent to [id number] - [place], [place] - [address]   [address] , [facility] [address]    Hours:  test fax successfully sent 10/22/03 kr Phone:  [phone]     Fluocinolone Acetonide Scalp 0.01 % Oil oil    pravastatin 40 MG tablet    traZODone 50 MG tablet    triamterene-hydrochlorothiazide 37.5-25 MG per capsule    venlafaxine 150 MG 24 hr capsule         Some of these will need a paper prescription and others can be bought over the counter.  Ask your nurse if you have questions.     Bring a paper prescription for each of these medications     diazepam 5 MG tablet    zolpidem 10 MG tablet                Primary Care Provider Office Phone # Fax #    Carlie Real [phone] [address] [facility] [address]        Equal Access to Services     [facility] AH: Hadii aad ku hadasho Soomaali, waaxda luqadaha, qaybta kaalmada adeegyada, mary tamayo haydenisen anabella haddad . So [facility] [phone].    ATENCIÓN: Si habla español, tiene a avalos disposición servicios gratuitos de asistencia lingüística. Llame al [phone].    We comply with applicable federal civil rights laws and [place] laws. We do not discriminate on the basis of race, color, national origin, age, disability, sex, sexual orientation, or gender  identity.            Thank you!     Thank you for choosing [facility]  for your care. Our goal is always to provide you with excellent care. Hearing back from our patients is one way we can continue to improve our services. Please take a few minutes to complete the written survey that you may receive in the mail after your visit with us. Thank you!             Your Updated Medication List - Protect others around you: Learn how to safely use, store and throw away your medicines at www.disposemymeds.org.          This list is accurate as of: 12/14/17  9:01 AM.  Always use your most recent med list.                   Brand Name Dispense Instructions for use Diagnosis    aspirin 325 MG EC tablet      one tablet by mouth daily (buffered)        atenolol 50 MG tablet    TENORMIN    90 tablet    TAKE 1 TABLET BY MOUTH ONCE DAILY    Essential hypertension, benign       betamethasone dipropionate 0.05 % ointment    DIPROSONE    45 g    Apply sparingly to affected area twice daily as needed.  Do not apply to face.    Psoriasis       * diazepam 5 MG tablet    VALIUM    60 tablet    Take 1 as needed for menieres daily    Meniere's disease, right       * diazepam 5 MG tablet    VALIUM    60 tablet    Take 1 tablet (5 mg) by mouth every 12 hours as needed for anxiety or sleep    Meniere disease, unspecified laterality       DIPHENHYDRAMINE HCL PO      Take 25 mg by mouth daily as needed (for Menieres disease)        Fluocinolone Acetonide Scalp 0.01 % Oil oil     120 mL    Apply topically 2 times daily as needed    Psoriasis       fluticasone 50 MCG/ACT spray    FLONASE    3 Package    Spray 1-2 sprays into both nostrils daily    PND (post-nasal drip)       meclizine 25 MG tablet    ANTIVERT    60 tablet    Take 2 tablets by mouth 2 times daily as needed.    Meniere disease       pravastatin 40 MG tablet    PRAVACHOL    90 tablet    TAKE ONE TABLET BY MOUTH EVERY DAY AT BEDTIME    Hyperlipidemia LDL goal <130        predniSONE 20 MG tablet    DELTASONE    20 tablet    Take 3 tabs (60 mg) orally daily for 3 days, 2 tabs (40 mg) daily for 3 days, 1 tab (20 mg) daily for 3 days, then 1/2 tab (10 mg) for 3 day    Seasonal allergic rhinitis, unspecified allergic rhinitis trigger, Meniere's disease, right       prochlorperazine 10 MG tablet    COMPAZINE    10 tablet    Take 0.5 tablets (5 mg) by mouth every 6 hours as needed for nausea or vomiting        traZODone 50 MG tablet    DESYREL    270 tablet    Take 1-3 tablets ( mg) by mouth At Bedtime    Persistent insomnia       triamterene-hydrochlorothiazide 37.5-25 MG per capsule    DYAZIDE    90 capsule    take 1 capsule by mouth once daily in the morning    Hyperlipidemia LDL goal <130       venlafaxine 150 MG 24 hr capsule    EFFEXOR-XR    90 capsule    TAKE ONE CAPSULE BY MOUTH ONCE DAILY    Anxiety       zolpidem 10 MG tablet    AMBIEN    30 tablet    TAKE 1 TABLET BY MOUTH NIGHTLY AT BEDTIME AS NEEDED FOR SLEEP    Persistent insomnia       * Notice:  This list has 2 medication(s) that are the same as other medications prescribed for you. Read the directions carefully, and ask your doctor or other care provider to review them with you.

## 2017-12-27 NOTE — TELEPHONE ENCOUNTER
Reason for Call:  Medication or medication refill:    Do you use a [facility]?  Name of the pharmacy and phone number for the current request:  [facility] - [place]    Name of the medication requested: tranzadone    Other request: none    Can we leave a detailed message on this number? YES    Phone number patient can be reached at: Home number on file [phone] (home)    Best Time: any time    Call taken on 12/27/2017 at 3:26 PM by Evette Ricks

## 2018-02-10 ENCOUNTER — HOSPITAL ENCOUNTER (EMERGENCY)
Facility: CLINIC | Age: 58
Discharge: HOME | End: 2018-02-10
Attending: PHYSICIAN ASSISTANT | Admitting: PHYSICIAN ASSISTANT
Payer: COMMERCIAL

## 2018-02-10 VITALS
SYSTOLIC BLOOD PRESSURE: 146 MMHG | OXYGEN SATURATION: 94 % | RESPIRATION RATE: 16 BRPM | HEIGHT: 63 IN | TEMPERATURE: 98 F | DIASTOLIC BLOOD PRESSURE: 78 MMHG

## 2018-02-10 DIAGNOSIS — L60.0 INGROWN TOENAIL WITHOUT INFECTION: ICD-10-CM

## 2018-02-10 PROCEDURE — G0463 HOSPITAL OUTPT CLINIC VISIT: HCPCS | Mod: 25

## 2018-02-10 PROCEDURE — 99213 OFFICE O/P EST LOW 20 MIN: CPT | Mod: 25 | Performed by: PHYSICIAN ASSISTANT

## 2018-02-10 PROCEDURE — 11765 WEDGE EXCISION SKN NAIL FOLD: CPT

## 2018-02-10 PROCEDURE — 11765 WEDGE EXCISION SKN NAIL FOLD: CPT | Mod: Z6 | Performed by: PHYSICIAN ASSISTANT

## 2018-02-10 NOTE — DISCHARGE INSTRUCTIONS
Ingrown Toenail (Excised)  An ingrown toenail occurs when the nail grows sideways into the skin next to the nail. This can cause pain and may lead to an infection with redness, swelling, and sometimes drainage.  The most common cause of an ingrown toenail is trimming your toenails wrong. Most people trim the nails too close to the skin and try to round the nail too tightly around the shape of the toe. When you do this, the nail can grow into the skin of the toe. While it may look nice, your toenail can grow into the skin and cause infection. It is safer to trim the nail to end in a straight line rather than a curve.  Other causes include injury or wearing shoes that are too short or tight. This can cause the same problem that happens when trimming your toenails. Sometimes you are born with a toenail that grows too large for your toe.  The most common symptoms of an ingrown toenail include:    Pain    Redness    Swelling    Drainage  Treatment  It's important to treat an ingrown toenail as soon as you notice there is a problem. If the infection is mild, you may be able to take care of it at home. Home care includes:    Frequent warm water soaks    Keeping the nail clean    Wearing loose, comfortable shoes or open toe sandals  Another method to help the toe heal is to use a small piece of cotton or waxed dental floss to gently lift the corner of the problem nail. Change the cotton or floss frequently, especially if it gets dirty.  If your infection is mild but home care isn't working, or the toenail is getting worse, see your healthcare provider. Signs of worsening infection include:    Swelling    Redness     Pus drainage  In some cases, part of the toenail needs to be removed by your healthcare provider so that the infection can be drained.  If there is a lot of redness and swelling, then an antibiotic may also be used. The redness and pain should go away within 48 hours. It will take about 2 weeks for the exposed  nail bed to become dry and for the swelling to go down.  If only the side of the nail was removed, it will begin to grow back in a few months. To prevent recurrence, sometimes the side of the nail bed may be treated with a strong chemical to prevent the nail from growing back.  Home care  Wound care    Twice a day for the first 3 days, clean and soak the toe as follows:    Soak your foot in a tub of warm water for 5 minutes. Or, hold your toe under a faucet of warm running water for 5 minutes.    Clean any remaining crust away with soap and water using a cotton swab.    Put a small amount of antibiotic ointment on the infected area.    Cover with a bandage until the exposed nail bed is dry and there is no more drainage.    Change the dressing or bandage every time you soak or clean it, or whenever it becomes wet or dirty.    If you were prescribed antibiotics, take them as directed until they are all gone.    While your toe is healing wear comfortable shoes with a lot of toe room. Or wear open-toe sandals.  Medicines    You can take over-the-counter medicine for pain, unless you were given a different pain medicine to use. Note: Talk with your healthcare provider before using these medicines if you have chronic liver or kidney disease, have ever had a stomach ulcer or GI (gastrointestinal) bleeding, or are taking blood thinner medicines.    If you were given antibiotics, take them until they are all gone. It is important to finish the antibiotics even if the wound looks better. This ensures that the infection clears.  Prevention  To prevent ingrown toenails:    Wear shoes that fit well. Avoid shoes that pinch the toes together.    When you trim your toenails, don t cut them too short. Cut straight across at the top and don t round the edges.    Don t use a sharp object to clean under your nail since this might cause an infection.    If the toenail starts to grow into the skin again, put a small piece of cotton under  that side of the nail to help it grow out straight.  Follow-up care  Follow up as advised by your healthcare provider. If the ingrown toenail recurs, follow up with a foot specialist (podiatrist) for nail bed ablation.  When to seek medical care  Call your healthcare provider right away if any of these occur:    Increasing redness, pain, or swelling of the toe    Red streaks in the skin leading away from the wound    Continued pus or fluid drainage for more than 24 hours    Fever of 100.4 F (38 C) or higher, or as directed by your provider  Date Last Reviewed: 11/1/2016 2000-2017 [employer]. [address] [place] [address], [place] [address]. All rights reserved. This information is not intended as a substitute for professional medical care. Always follow your healthcare professional's instructions.

## 2018-02-10 NOTE — ED AVS SNAPSHOT
[facility] Department    [address] [address]    Phone:  [phone]    Fax:  [phone]                                       Kenya Rubalcava   MRN: [id number]    Department:  [facility] Department   Date of Visit:  2/10/2018           Patient Information     Date Of Birth          1960        Your diagnoses for this visit were:     Ingrown toenail without infection        You were seen by Armand Rizzo PA-C.        Discharge Instructions         Ingrown Toenail (Excised)  An ingrown toenail occurs when the nail grows sideways into the skin next to the nail. This can cause pain and may lead to an infection with redness, swelling, and sometimes drainage.  The most common cause of an ingrown toenail is trimming your toenails wrong. Most people trim the nails too close to the skin and try to round the nail too tightly around the shape of the toe. When you do this, the nail can grow into the skin of the toe. While it may look nice, your toenail can grow into the skin and cause infection. It is safer to trim the nail to end in a straight line rather than a curve.  Other causes include injury or wearing shoes that are too short or tight. This can cause the same problem that happens when trimming your toenails. Sometimes you are born with a toenail that grows too large for your toe.  The most common symptoms of an ingrown toenail include:    Pain    Redness    Swelling    Drainage  Treatment  It's important to treat an ingrown toenail as soon as you notice there is a problem. If the infection is mild, you may be able to take care of it at home. Home care includes:    Frequent warm water soaks    Keeping the nail clean    Wearing loose, comfortable shoes or open toe sandals  Another method to help the toe heal is to use a small piece of cotton or waxed dental floss to gently lift the corner of the problem nail. Change the cotton or floss frequently,  especially if it gets dirty.  If your infection is mild but home care isn't working, or the toenail is getting worse, see your healthcare provider. Signs of worsening infection include:    Swelling    Redness     Pus drainage  In some cases, part of the toenail needs to be removed by your healthcare provider so that the infection can be drained.  If there is a lot of redness and swelling, then an antibiotic may also be used. The redness and pain should go away within 48 hours. It will take about 2 weeks for the exposed nail bed to become dry and for the swelling to go down.  If only the side of the nail was removed, it will begin to grow back in a few months. To prevent recurrence, sometimes the side of the nail bed may be treated with a strong chemical to prevent the nail from growing back.  Home care  Wound care    Twice a day for the first 3 days, clean and soak the toe as follows:    Soak your foot in a tub of warm water for 5 minutes. Or, hold your toe under a faucet of warm running water for 5 minutes.    Clean any remaining crust away with soap and water using a cotton swab.    Put a small amount of antibiotic ointment on the infected area.    Cover with a bandage until the exposed nail bed is dry and there is no more drainage.    Change the dressing or bandage every time you soak or clean it, or whenever it becomes wet or dirty.    If you were prescribed antibiotics, take them as directed until they are all gone.    While your toe is healing wear comfortable shoes with a lot of toe room. Or wear open-toe sandals.  Medicines    You can take over-the-counter medicine for pain, unless you were given a different pain medicine to use. Note: Talk with your healthcare provider before using these medicines if you have chronic liver or kidney disease, have ever had a stomach ulcer or GI (gastrointestinal) bleeding, or are taking blood thinner medicines.    If you were given antibiotics, take them until they are all  gone. It is important to finish the antibiotics even if the wound looks better. This ensures that the infection clears.  Prevention  To prevent ingrown toenails:    Wear shoes that fit well. Avoid shoes that pinch the toes together.    When you trim your toenails, don t cut them too short. Cut straight across at the top and don t round the edges.    Don t use a sharp object to clean under your nail since this might cause an infection.    If the toenail starts to grow into the skin again, put a small piece of cotton under that side of the nail to help it grow out straight.  Follow-up care  Follow up as advised by your healthcare provider. If the ingrown toenail recurs, follow up with a foot specialist (podiatrist) for nail bed ablation.  When to seek medical care  Call your healthcare provider right away if any of these occur:    Increasing redness, pain, or swelling of the toe    Red streaks in the skin leading away from the wound    Continued pus or fluid drainage for more than 24 hours    Fever of 100.4 F (38 C) or higher, or as directed by your provider  Date Last Reviewed: 11/1/2016 2000-2017 [employer]. [address] [place] [address]. All rights reserved. This information is not intended as a substitute for professional medical care. Always follow your healthcare professional's instructions.          24 Hour Appointment Hotline       To make an appointment at any [facility], call [phone] ([phone]). If you don't have a family doctor or clinic, we will help you find one. [facility] clinics are conveniently located to serve the needs of you and your family.             Review of your medicines      Our records show that you are taking the medicines listed below. If these are incorrect, please call your family doctor or clinic.        Dose / Directions Last dose taken    aspirin 325 MG EC tablet        one tablet by mouth daily (buffered)   Refills:  0        atenolol  50 MG tablet   Commonly known as:  TENORMIN   Quantity:  90 tablet        TAKE 1 TABLET BY MOUTH ONCE DAILY   Refills:  2        betamethasone dipropionate 0.05 % ointment   Commonly known as:  DIPROSONE   Quantity:  45 g        Apply sparingly to affected area twice daily as needed.  Do not apply to face.   Refills:  0        * diazepam 5 MG tablet   Commonly known as:  VALIUM   Quantity:  60 tablet        Take 1 as needed for menieres daily   Refills:  0        * diazepam 5 MG tablet   Commonly known as:  VALIUM   Dose:  5 mg   Quantity:  60 tablet        Take 1 tablet (5 mg) by mouth every 12 hours as needed for anxiety or sleep   Refills:  3        DIPHENHYDRAMINE HCL PO   Dose:  25 mg        Take 25 mg by mouth daily as needed (for Menieres disease)   Refills:  0        Fluocinolone Acetonide Scalp 0.01 % Oil oil   Quantity:  120 mL        Apply topically 2 times daily as needed   Refills:  3        fluticasone 50 MCG/ACT spray   Commonly known as:  FLONASE   Dose:  1-2 spray   Quantity:  3 Package        Spray 1-2 sprays into both nostrils daily   Refills:  1        meclizine 25 MG tablet   Commonly known as:  ANTIVERT   Dose:  50 mg   Quantity:  60 tablet        Take 2 tablets by mouth 2 times daily as needed.   Refills:  0        pravastatin 40 MG tablet   Commonly known as:  PRAVACHOL   Quantity:  90 tablet        TAKE ONE TABLET BY MOUTH EVERY DAY AT BEDTIME   Refills:  3        predniSONE 20 MG tablet   Commonly known as:  DELTASONE   Quantity:  20 tablet        Take 3 tabs (60 mg) orally daily for 3 days, 2 tabs (40 mg) daily for 3 days, 1 tab (20 mg) daily for 3 days, then 1/2 tab (10 mg) for 3 day   Refills:  0        prochlorperazine 10 MG tablet   Commonly known as:  COMPAZINE   Dose:  5 mg   Quantity:  10 tablet        Take 0.5 tablets (5 mg) by mouth every 6 hours as needed for nausea or vomiting   Refills:  1        traZODone 50 MG tablet   Commonly known as:  DESYREL   Dose:   mg    Quantity:  270 tablet        Take 1-3 tablets ( mg) by mouth At Bedtime   Refills:  1        triamterene-hydrochlorothiazide 37.5-25 MG per capsule   Commonly known as:  DYAZIDE   Quantity:  90 capsule        take 1 capsule by mouth once daily in the morning   Refills:  0        venlafaxine 150 MG 24 hr capsule   Commonly known as:  EFFEXOR-XR   Quantity:  90 capsule        TAKE ONE CAPSULE BY MOUTH ONCE DAILY   Refills:  1        zolpidem 10 MG tablet   Commonly known as:  AMBIEN   Quantity:  30 tablet        TAKE 1 TABLET BY MOUTH NIGHTLY AT BEDTIME AS NEEDED FOR SLEEP   Refills:  0        * Notice:  This list has 2 medication(s) that are the same as other medications prescribed for you. Read the directions carefully, and ask your doctor or other care provider to review them with you.            Orders Needing Specimen Collection     None      Pending Results     No orders found from 2/8/2018 to 2/11/2018.            Pending Culture Results     No orders found from 2/8/2018 to 2/11/2018.            Pending Results Instructions     If you had any lab results that were not finalized at the time of your Discharge, you can call the ED Lab Result RN at [phone]. You will be contacted by this team for any positive Lab results or changes in treatment. The nurses are available 7 days a week from 10A to 6:30P.  You can leave a message 24 hours per day and they will return your call.        Test Results From Your Hospital Stay               Thank you for choosing [facility]       Thank you for choosing [facility] for your care. Our goal is always to provide you with excellent care. Hearing back from our patients is one way we can continue to improve our services. Please take a few minutes to complete the written survey that you may receive in the mail after you visit with us. Thank you!        The [employer] Information     [employer] gives you secure access to your electronic health record. If you see a primary care  provider, you can also send messages to your care team and make appointments. If you have questions, please call your primary care clinic.  If you do not have a primary care provider, please call [phone] and they will assist you.        Care EveryWhere ID     This is your Care EveryWhere ID. This could be used by other organizations to access your [facility] medical records  [id number]        Equal Access to Services     SHILOH AMBROSE : Rudolph Menendez, saranya goetz, hasmukh bolden, mary timmons. So [facility] [phone].    ATENCIÓN: Si habla español, tiene a avalos disposición servicios gratuitos de asistencia lingüística. Llame al [phone].    We comply with applicable federal civil rights laws and [place] laws. We do not discriminate on the basis of race, color, national origin, age, disability, sex, sexual orientation, or gender identity.            After Visit Summary       This is your record. Keep this with you and show to your community pharmacist(s) and doctor(s) at your next visit.

## 2018-02-10 NOTE — ED PROVIDER NOTES
Chief Complaint:    Chief Complaint   Patient presents with     Toe Injury     from a pedicure       HPI: Kenya Rubalcava is an 57 year old female who presents for evaluation and treatment of R ingrown toe nail of the R toe.  Patient has had problems with this in the past.  She has had pain in the R great toe for roughly 1 week now.  She has tried soaks, and they are not working anymore.  She denies any numbness or tingling in the toe.        ROS:  Further problem focused system review was otherwise negative.        Surgical History:  Past Surgical History:   Procedure Laterality Date     CARPAL TUNNEL RELEASE RT/LT  08/2008    Left      COLONOSCOPY  8/7/2012    Procedure: COLONOSCOPY;  Colonoscopy;  Surgeon: Kailyn Lopez MD;  Location: [facility]     CYSTOSCOPY, RETROGRADES, INSERT STENT URETER(S), COMBINED Left 8/19/2016    Procedure: COMBINED CYSTOSCOPY, RETROGRADES, INSERT STENT URETER(S);  Surgeon: Terence Carpenter MD;  [facility]     esteem implant  06/23/2011    Hearing implant right ear.     HYSTERECTOMY, PAP NO LONGER INDICATED  12/2004    Vaginal hysterectomy     LAPAROSCOPIC ABLATION ENDOMETRIOSIS  07/2004     LASER HOLMIUM LITHOTRIPSY URETER(S), INSERT STENT, COMBINED Left 9/12/2016    Procedure: COMBINED CYSTOSCOPY, URETEROSCOPY, LASER HOLMIUM LITHOTRIPSY URETER(S), INSERT STENT;  Surgeon: Kailyn Dozier MD;  Location: [facility]     LEFT wrist surgery       Sinus surgery with repair of deviated septum       TUBAL LIGATION          Problem List:  Patient Active Problem List   Diagnosis     Anxiety disorder due to medical condition     Essential hypertension, benign     Sensorineural hearing loss     Peripheral vertigo     Allergic rhinitis     Symptomatic menopausal or female climacteric states     Psoriasis     Hyperlipidemia LDL goal <130     Kidney stones     Hypertriglyceridemia     Meniere disease     Anxiety     Kidney stone        Allergies:  Allergies   Allergen  Reactions     Ivp Dye [Contrast Dye] Anaphylaxis and Hives     Cats Anaphylaxis     Niaspan [Niacin] Rash     Pollen Extract/Tree Extract         Current Meds:  No current facility-administered medications for this encounter.     Current Outpatient Prescriptions:      venlafaxine (EFFEXOR-XR) 150 MG 24 hr capsule, TAKE ONE CAPSULE BY MOUTH ONCE DAILY, Disp: 90 capsule, Rfl: 1     pravastatin (PRAVACHOL) 40 MG tablet, TAKE ONE TABLET BY MOUTH EVERY DAY AT BEDTIME, Disp: 90 tablet, Rfl: 3     traZODone (DESYREL) 50 MG tablet, Take 1-3 tablets ( mg) by mouth At Bedtime, Disp: 270 tablet, Rfl: 1     triamterene-hydrochlorothiazide (DYAZIDE) 37.5-25 MG per capsule, take 1 capsule by mouth once daily in the morning, Disp: 90 capsule, Rfl: 0     zolpidem (AMBIEN) 10 MG tablet, TAKE 1 TABLET BY MOUTH NIGHTLY AT BEDTIME AS NEEDED FOR SLEEP, Disp: 30 tablet, Rfl: 0     diazepam (VALIUM) 5 MG tablet, Take 1 tablet (5 mg) by mouth every 12 hours as needed for anxiety or sleep, Disp: 60 tablet, Rfl: 3     Fluocinolone Acetonide Scalp 0.01 % OIL oil, Apply topically 2 times daily as needed, Disp: 120 mL, Rfl: 3     atenolol (TENORMIN) 50 MG tablet, TAKE 1 TABLET BY MOUTH ONCE DAILY, Disp: 90 tablet, Rfl: 2     diazepam (VALIUM) 5 MG tablet, Take 1 as needed for menieres daily, Disp: 60 tablet, Rfl: 0     predniSONE (DELTASONE) 20 MG tablet, Take 3 tabs (60 mg) orally daily for 3 days, 2 tabs (40 mg) daily for 3 days, 1 tab (20 mg) daily for 3 days, then 1/2 tab (10 mg) for 3 day, Disp: 20 tablet, Rfl: 0     betamethasone dipropionate (DIPROSONE) 0.05 % ointment, Apply sparingly to affected area twice daily as needed.  Do not apply to face., Disp: 45 g, Rfl: 0     prochlorperazine (COMPAZINE) 10 MG tablet, Take 0.5 tablets (5 mg) by mouth every 6 hours as needed for nausea or vomiting, Disp: 10 tablet, Rfl: 1     DIPHENHYDRAMINE HCL PO, Take 25 mg by mouth daily as needed (for Menieres disease), Disp: , Rfl:      fluticasone  "(FLONASE) 50 MCG/ACT nasal spray, Spray 1-2 sprays into both nostrils daily, Disp: 3 Package, Rfl: 1     meclizine (ANTIVERT) 25 MG tablet, Take 2 tablets by mouth 2 times daily as needed., Disp: 60 tablet, Rfl: 0     ASPIRIN 325 MG OR TBEC, one tablet by mouth daily (buffered), Disp: , Rfl:      PHYSICAL EXAM:     Vital signs noted and reviewed by Armand Rizzo  /78  Temp 98  F (36.7  C) (Oral)  Resp 16  Ht 1.6 m (5' 3\")  SpO2 94%     PEFR:  General appearance: healthy, alert and no distress  Ears: R TM - normal: no effusions, no erythema, and normal landmarks, L TM - normal: no effusions, no erythema, and normal landmarks  Eyes: R normal, L normal  Nose: normal  Oropharynx: normal  Neck: supple and no adenopathy  Lungs: normal and clear to auscultation  Heart: S1, S2 normal, no murmur, click, rub or gallop, regular rate and rhythm  Extremities: R foot - erythema of the great toe along the lateral nail border.  No swelling, or discharge.  No visible abscess.  Full range of motion of toe.  Toes is neurologically intact.       ASSESSMENT:     1. Ingrown toenail without infection           PLAN:     Patient is requesting removal of the ingrown portion of the nail today.  We discussed risks associated with this.  Lateral portion of the toe was anesthetized with 1% lidocaine via digital block.  Small scissors and needle  used to excise wedge shaped portion of the nail.  Toe was cleaned with surgiscrub, and antibiotic ointment and band aid applied.  Reviewed signs and symptoms of infection with instructions to return.  Patient verbalized understanding and agreed with this plan.     Armand Rizzo  2/10/2018, 1:17 PM       Armand Rizzo PA-C  02/10/18 1455    "

## 2018-02-12 ENCOUNTER — TELEPHONE (OUTPATIENT)
Dept: FAMILY MEDICINE | Facility: CLINIC | Age: 58
End: 2018-02-12

## 2018-02-12 NOTE — TELEPHONE ENCOUNTER
Reason for Call: Request for an order or referral:    Order or referral being requested: Nate LAWRENCE MESSAGE:  She states that she needs labs before she can refill a medication.  Not sure what labs -  Just had labs done in December and she didn't mention in message which medication.  Please call and assess. Thank you..Evette Ricks    Date needed: at your convenience    Has the patient been seen by the PCP for this problem? YES    Phone number Patient can be reached at:  Other phone number:  Didn't leave number - [phone]*    Best Time:  Did not specify    Can we leave a detailed message on this number?  did not specify    Call taken on 2/12/2018 at 1:34 PM by Evette Ricks

## 2018-02-12 NOTE — TELEPHONE ENCOUNTER
Patient spoke with Evette. She had called in from an old bottle.   pravastatin (PRAVACHOL) 40 MG tablet 90 tablet 3 12/14/2017  No      Sig: TAKE ONE TABLET BY MOUTH EVERY DAY AT BEDTIME     Class: E-Prescribe     Order: [id number]     E-Prescribing Status: Receipt confirmed by pharmacy (12/14/2017  8:43 AM CST)       Printout Tracking      External Result Report       Medication Administration Instructions      TAKE ONE TABLET BY MOUTH EVERY DAY AT BEDTIME       Pharmacy      [id number] - [address] Mark Cosme, RN

## 2018-05-31 DIAGNOSIS — E78.5 HYPERLIPIDEMIA LDL GOAL <130: ICD-10-CM

## 2018-05-31 RX ORDER — TRIAMTERENE AND HYDROCHLOROTHIAZIDE 37.5; 25 MG/1; MG/1
CAPSULE ORAL
Qty: 90 CAPSULE | Refills: 0 | Status: SHIPPED | OUTPATIENT
Start: 2018-05-31 | End: 2018-08-31

## 2018-05-31 NOTE — TELEPHONE ENCOUNTER
"Triamterene-HCTZ Oral Capsule 37.5-25 MG          Last Written Prescription Date:  12/14/17  Last Fill Quantity: 90,   # refills: 0  Last Office Visit: 12/14/17  Future Office visit:       Requested Prescriptions   Pending Prescriptions Disp Refills     triamterene-hydrochlorothiazide (DYAZIDE) 37.5-25 MG per capsule [Pharmacy Med Name: Triamterene-HCTZ Oral Capsule 37.5-25 MG] 90 capsule 0     Sig: take 1 capsule by mouth once daily in the morning    Diuretics (Including Combos) Protocol Failed    5/31/2018  6:30 AM       Failed - Blood pressure under 140/90 in past 12 months    BP Readings from Last 3 Encounters:   02/10/18 146/78   12/14/17 114/68   11/25/17 125/67                Passed - Recent (12 mo) or future (30 days) visit within the authorizing provider's specialty    Patient had office visit in the last 12 months or has a visit in the next 30 days with authorizing provider or within the authorizing provider's specialty.  See \"Patient Info\" tab in inbasket, or \"Choose Columns\" in Meds & Orders section of the refill encounter.           Passed - Patient is age 18 or older       Passed - No active pregancy on record       Passed - Normal serum creatinine on file in past 12 months    Recent Labs   Lab Test  12/14/17   0905   CR  0.72             Passed - Normal serum potassium on file in past 12 months    Recent Labs   Lab Test  12/14/17   0905   POTASSIUM  3.6                   Passed - Normal serum sodium on file in past 12 months    Recent Labs   Lab Test  12/14/17   0905   NA  137             Passed - No positive pregnancy test in past 12 months          "

## 2018-06-04 DIAGNOSIS — E78.5 HYPERLIPIDEMIA LDL GOAL <130: ICD-10-CM

## 2018-06-04 NOTE — TELEPHONE ENCOUNTER
"Triamterene-HCTZ Oral Capsule 37.5-25 MG        Last Written Prescription Date:  5/31/18  Last Fill Quantity: 90,   # refills: 0  Last Office Visit: 12/14/17  Future Office visit:       Requested Prescriptions   Pending Prescriptions Disp Refills     triamterene-hydrochlorothiazide (DYAZIDE) 37.5-25 MG per capsule [Pharmacy Med Name: Triamterene-HCTZ Oral Capsule 37.5-25 MG] 90 capsule 0     Sig: take 1 capsule by mouth once daily in the morning    Diuretics (Including Combos) Protocol Failed    6/4/2018  6:47 AM       Failed - Blood pressure under 140/90 in past 12 months    BP Readings from Last 3 Encounters:   02/10/18 146/78   12/14/17 114/68   11/25/17 125/67                Passed - Recent (12 mo) or future (30 days) visit within the authorizing provider's specialty    Patient had office visit in the last 12 months or has a visit in the next 30 days with authorizing provider or within the authorizing provider's specialty.  See \"Patient Info\" tab in inbasket, or \"Choose Columns\" in Meds & Orders section of the refill encounter.           Passed - Patient is age 18 or older       Passed - No active pregancy on record       Passed - Normal serum creatinine on file in past 12 months    Recent Labs   Lab Test  12/14/17   0905   CR  0.72             Passed - Normal serum potassium on file in past 12 months    Recent Labs   Lab Test  12/14/17   0905   POTASSIUM  3.6                   Passed - Normal serum sodium on file in past 12 months    Recent Labs   Lab Test  12/14/17   0905   NA  137             Passed - No positive pregnancy test in past 12 months          "

## 2018-06-05 RX ORDER — TRIAMTERENE AND HYDROCHLOROTHIAZIDE 37.5; 25 MG/1; MG/1
CAPSULE ORAL
Qty: 90 CAPSULE | Refills: 0 | OUTPATIENT
Start: 2018-06-05

## 2018-06-11 DIAGNOSIS — I10 ESSENTIAL HYPERTENSION, BENIGN: ICD-10-CM

## 2018-06-11 RX ORDER — ATENOLOL 50 MG/1
TABLET ORAL
Qty: 90 TABLET | Refills: 1 | Status: SHIPPED | OUTPATIENT
Start: 2018-06-11 | End: 2018-12-05

## 2018-06-11 NOTE — TELEPHONE ENCOUNTER
"Atenolol Oral Tablet 50 MG        Last Written Prescription Date:  9/18/17  Last Fill Quantity: 90,   # refills: 2  Last Office Visit: 12/14/17  Future Office visit:       Requested Prescriptions   Pending Prescriptions Disp Refills     atenolol (TENORMIN) 50 MG tablet [Pharmacy Med Name: Atenolol Oral Tablet 50 MG] 90 tablet 1     Sig: TAKE 1 TABLET BY MOUTH ONCE DAILY    Beta-Blockers Protocol Failed    6/11/2018  7:00 AM       Failed - Blood pressure under 140/90 in past 12 months    BP Readings from Last 3 Encounters:   02/10/18 146/78   12/14/17 114/68   11/25/17 125/67                Passed - Patient is age 6 or older       Passed - Recent (12 mo) or future (30 days) visit within the authorizing provider's specialty    Patient had office visit in the last 12 months or has a visit in the next 30 days with authorizing provider or within the authorizing provider's specialty.  See \"Patient Info\" tab in inbasket, or \"Choose Columns\" in Meds & Orders section of the refill encounter.              "

## 2018-06-11 NOTE — TELEPHONE ENCOUNTER
Prescription approved per [facility] Refill Protocol.  Mark Cosme RN

## 2018-06-16 DIAGNOSIS — G47.00 PERSISTENT INSOMNIA: ICD-10-CM

## 2018-06-18 RX ORDER — TRAZODONE HYDROCHLORIDE 50 MG/1
TABLET, FILM COATED ORAL
Qty: 270 TABLET | Refills: 1 | Status: SHIPPED | OUTPATIENT
Start: 2018-06-18 | End: 2018-12-05

## 2018-06-18 NOTE — TELEPHONE ENCOUNTER
"TraZODone HCl Oral Tablet 50 MG        Last Written Prescription Date:  12/14/17  Last Fill Quantity: 270,   # refills: 1  Last Office Visit: 12/14/17  Future Office visit:       Requested Prescriptions   Pending Prescriptions Disp Refills     traZODone (DESYREL) 50 MG tablet [Pharmacy Med Name: TraZODone HCl Oral Tablet 50 MG] 270 tablet 0     Sig: Take 1-3 tablets ( mg) by mouth nightly At Bedtime    Serotonin Modulators Passed    6/16/2018  1:24 PM       Passed - Recent (12 mo) or future (30 days) visit within the authorizing provider's specialty    Patient had office visit in the last 12 months or has a visit in the next 30 days with authorizing provider or within the authorizing provider's specialty.  See \"Patient Info\" tab in inbasket, or \"Choose Columns\" in Meds & Orders section of the refill encounter.           Passed - Patient is age 18 or older       Passed - No active pregnancy on record       Passed - No positive pregnancy test in past 12 months          "

## 2018-07-02 ENCOUNTER — HOSPITAL ENCOUNTER (OUTPATIENT)
Dept: MAMMOGRAPHY | Facility: CLINIC | Age: 58
Discharge: HOME | End: 2018-07-02
Attending: FAMILY MEDICINE | Admitting: FAMILY MEDICINE
Payer: COMMERCIAL

## 2018-07-02 DIAGNOSIS — Z12.31 ENCOUNTER FOR SCREENING MAMMOGRAM FOR BREAST CANCER: ICD-10-CM

## 2018-07-02 PROCEDURE — 77067 SCR MAMMO BI INCL CAD: CPT

## 2018-07-30 DIAGNOSIS — H81.03 MENIERE'S DISEASE, BILATERAL: Primary | ICD-10-CM

## 2018-08-01 RX ORDER — DIAZEPAM 5 MG
TABLET ORAL
Qty: 60 TABLET | Refills: 2 | Status: SHIPPED | OUTPATIENT
Start: 2018-08-01 | End: 2019-02-28

## 2018-08-02 ENCOUNTER — TELEPHONE (OUTPATIENT)
Dept: FAMILY MEDICINE | Facility: CLINIC | Age: 58
End: 2018-08-02

## 2018-08-02 DIAGNOSIS — L60.0 INGROWING TOENAIL WITHOUT INFECTION: Primary | ICD-10-CM

## 2018-08-02 DIAGNOSIS — H81.01 MENIERE'S DISEASE, RIGHT: ICD-10-CM

## 2018-08-02 NOTE — TELEPHONE ENCOUNTER
Reason for call:  Patient reporting a symptom    Symptom or request: Kenya has had problems previously with ingrown toenails and it seems to be coming back again.  Both feet.  She states that she had toenails that curl.  She was hoping to see a podiatrist about these, but would need referral.  Could you please review and place referral if appropriate.  Thank you..Evette Ricks    Duration (how long have symptoms been present): on going off and on    Have you been treated for this before? Yes    Phone Number patient can be reached at:  Home number on file [phone] (home)    Best Time:  Any time    Can we leave a detailed message on this number:  YES    Call taken on 8/2/2018 at 8:17 AM by Evette Ricks

## 2018-08-28 DIAGNOSIS — F41.9 ANXIETY: ICD-10-CM

## 2018-08-28 RX ORDER — VENLAFAXINE HYDROCHLORIDE 150 MG/1
CAPSULE, EXTENDED RELEASE ORAL
Qty: 90 CAPSULE | Refills: 0 | Status: SHIPPED | OUTPATIENT
Start: 2018-08-28 | End: 2018-11-28

## 2018-08-28 NOTE — TELEPHONE ENCOUNTER
"Venlafaxine HCl ER Oral Capsule Extended Release 24 Hour 150 MG        Last Written Prescription Date:  12/14/17  Last Fill Quantity: 90,   # refills: 1  Last Office Visit: 12/14/17  Future Office visit:       Requested Prescriptions   Pending Prescriptions Disp Refills     venlafaxine (EFFEXOR-XR) 150 MG 24 hr capsule [Pharmacy Med Name: Venlafaxine HCl ER Oral Capsule Extended Release 24 Hour 150 MG] 90 capsule 0     Sig: TAKE 1 CAPSULE BY MOUTH ONCE DAILY    Serotonin-Norepinephrine Reuptake Inhibitors  Failed    8/28/2018  6:43 AM       Failed - Blood pressure under 140/90 in past 12 months    BP Readings from Last 3 Encounters:   02/10/18 146/78   12/14/17 114/68   11/25/17 125/67                Passed - Recent (12 mo) or future (30 days) visit within the authorizing provider's specialty    Patient had office visit in the last 12 months or has a visit in the next 30 days with authorizing provider or within the authorizing provider's specialty.  See \"Patient Info\" tab in inbasket, or \"Choose Columns\" in Meds & Orders section of the refill encounter.           Passed - Patient is age 18 or older       Passed - No active pregnancy on record       Passed - Normal serum creatinine on file in past 12 months    Recent Labs   Lab Test  12/14/17   0905   CR  0.72            Passed - No positive pregnancy test in past 12 months          "

## 2018-08-31 DIAGNOSIS — E78.5 HYPERLIPIDEMIA LDL GOAL <130: ICD-10-CM

## 2018-08-31 RX ORDER — TRIAMTERENE AND HYDROCHLOROTHIAZIDE 37.5; 25 MG/1; MG/1
CAPSULE ORAL
Qty: 90 CAPSULE | Refills: 0 | Status: SHIPPED | OUTPATIENT
Start: 2018-08-31 | End: 2018-12-01

## 2018-08-31 NOTE — TELEPHONE ENCOUNTER
"Requested Prescriptions   Pending Prescriptions Disp Refills     triamterene-hydrochlorothiazide (DYAZIDE) 37.5-25 MG per capsule [Pharmacy Med Name: Triamterene-HCTZ Oral Capsule 37.5-25 MG] 90 capsule 0    Last Written Prescription Date:  5/31/18  Last Fill Quantity: 90,  # refills: 0   Last office visit: 12/14/2017 with prescribing provider:  12/14/17 lacey   Future Office Visit:     Sig: take 1 capsule by mouth once daily in the morning    Diuretics (Including Combos) Protocol Failed    8/31/2018  7:00 AM       Failed - Blood pressure under 140/90 in past 12 months    BP Readings from Last 3 Encounters:   02/10/18 146/78   12/14/17 114/68   11/25/17 125/67                Passed - Recent (12 mo) or future (30 days) visit within the authorizing provider's specialty    Patient had office visit in the last 12 months or has a visit in the next 30 days with authorizing provider or within the authorizing provider's specialty.  See \"Patient Info\" tab in inbasket, or \"Choose Columns\" in Meds & Orders section of the refill encounter.           Passed - Patient is age 18 or older       Passed - No active pregancy on record       Passed - Normal serum creatinine on file in past 12 months    Recent Labs   Lab Test  12/14/17   0905   CR  0.72             Passed - Normal serum potassium on file in past 12 months    Recent Labs   Lab Test  12/14/17   0905   POTASSIUM  3.6                   Passed - Normal serum sodium on file in past 12 months    Recent Labs   Lab Test  12/14/17   0905   NA  137             Passed - No positive pregnancy test in past 12 months          "

## 2018-09-27 ENCOUNTER — HOSPITAL ENCOUNTER (EMERGENCY)
Facility: CLINIC | Age: 58
Discharge: HOME | End: 2018-09-27
Attending: NURSE PRACTITIONER | Admitting: NURSE PRACTITIONER
Payer: COMMERCIAL

## 2018-09-27 ENCOUNTER — APPOINTMENT (OUTPATIENT)
Dept: CT IMAGING | Facility: CLINIC | Age: 58
End: 2018-09-27
Attending: NURSE PRACTITIONER
Payer: COMMERCIAL

## 2018-09-27 VITALS
TEMPERATURE: 98.7 F | OXYGEN SATURATION: 92 % | RESPIRATION RATE: 18 BRPM | HEART RATE: 70 BPM | SYSTOLIC BLOOD PRESSURE: 113 MMHG | DIASTOLIC BLOOD PRESSURE: 58 MMHG

## 2018-09-27 DIAGNOSIS — R10.9 FLANK PAIN: ICD-10-CM

## 2018-09-27 LAB
ALBUMIN SERPL-MCNC: 4.1 G/DL (ref 3.4–5)
ALBUMIN UR-MCNC: NEGATIVE MG/DL
ALP SERPL-CCNC: 66 U/L (ref 40–150)
ALT SERPL W P-5'-P-CCNC: 47 U/L (ref 0–50)
ANION GAP SERPL CALCULATED.3IONS-SCNC: 5 MMOL/L (ref 3–14)
APPEARANCE UR: CLEAR
AST SERPL W P-5'-P-CCNC: 39 U/L (ref 0–45)
BACTERIA #/AREA URNS HPF: ABNORMAL /HPF
BASOPHILS # BLD AUTO: 0 10E9/L (ref 0–0.2)
BASOPHILS NFR BLD AUTO: 0.6 %
BILIRUB SERPL-MCNC: 0.3 MG/DL (ref 0.2–1.3)
BILIRUB UR QL STRIP: NEGATIVE
BUN SERPL-MCNC: 15 MG/DL (ref 7–30)
CALCIUM SERPL-MCNC: 9.4 MG/DL (ref 8.5–10.1)
CHLORIDE SERPL-SCNC: 104 MMOL/L (ref 94–109)
CO2 SERPL-SCNC: 31 MMOL/L (ref 20–32)
COLOR UR AUTO: ABNORMAL
CREAT SERPL-MCNC: 0.79 MG/DL (ref 0.52–1.04)
DIFFERENTIAL METHOD BLD: NORMAL
EOSINOPHIL # BLD AUTO: 0.2 10E9/L (ref 0–0.7)
EOSINOPHIL NFR BLD AUTO: 2.6 %
ERYTHROCYTE [DISTWIDTH] IN BLOOD BY AUTOMATED COUNT: 12.4 % (ref 10–15)
GFR SERPL CREATININE-BSD FRML MDRD: 75 ML/MIN/1.7M2
GLUCOSE SERPL-MCNC: 91 MG/DL (ref 70–99)
GLUCOSE UR STRIP-MCNC: NEGATIVE MG/DL
HCT VFR BLD AUTO: 42.9 % (ref 35–47)
HGB BLD-MCNC: 14.6 G/DL (ref 11.7–15.7)
HGB UR QL STRIP: ABNORMAL
IMM GRANULOCYTES # BLD: 0 10E9/L (ref 0–0.4)
IMM GRANULOCYTES NFR BLD: 0 %
KETONES UR STRIP-MCNC: NEGATIVE MG/DL
LEUKOCYTE ESTERASE UR QL STRIP: NEGATIVE
LYMPHOCYTES # BLD AUTO: 2.2 10E9/L (ref 0.8–5.3)
LYMPHOCYTES NFR BLD AUTO: 34.3 %
MCH RBC QN AUTO: 31.1 PG (ref 26.5–33)
MCHC RBC AUTO-ENTMCNC: 34 G/DL (ref 31.5–36.5)
MCV RBC AUTO: 92 FL (ref 78–100)
MONOCYTES # BLD AUTO: 0.5 10E9/L (ref 0–1.3)
MONOCYTES NFR BLD AUTO: 7.8 %
MUCOUS THREADS #/AREA URNS LPF: PRESENT /LPF
NEUTROPHILS # BLD AUTO: 3.6 10E9/L (ref 1.6–8.3)
NEUTROPHILS NFR BLD AUTO: 54.7 %
NITRATE UR QL: NEGATIVE
NRBC # BLD AUTO: 0 10*3/UL
NRBC BLD AUTO-RTO: 0 /100
PH UR STRIP: 8 PH (ref 5–7)
PLATELET # BLD AUTO: 212 10E9/L (ref 150–450)
POTASSIUM SERPL-SCNC: 3.7 MMOL/L (ref 3.4–5.3)
PROT SERPL-MCNC: 7.7 G/DL (ref 6.8–8.8)
RBC # BLD AUTO: 4.69 10E12/L (ref 3.8–5.2)
RBC #/AREA URNS AUTO: 2 /HPF (ref 0–2)
SODIUM SERPL-SCNC: 140 MMOL/L (ref 133–144)
SOURCE: ABNORMAL
SP GR UR STRIP: 1.01 (ref 1–1.03)
SQUAMOUS #/AREA URNS AUTO: 1 /HPF (ref 0–1)
UROBILINOGEN UR STRIP-MCNC: 2 MG/DL (ref 0–2)
WBC # BLD AUTO: 6.5 10E9/L (ref 4–11)
WBC #/AREA URNS AUTO: <1 /HPF (ref 0–5)

## 2018-09-27 PROCEDURE — 80053 COMPREHEN METABOLIC PANEL: CPT | Performed by: NURSE PRACTITIONER

## 2018-09-27 PROCEDURE — 81001 URINALYSIS AUTO W/SCOPE: CPT | Performed by: FAMILY MEDICINE

## 2018-09-27 PROCEDURE — 96375 TX/PRO/DX INJ NEW DRUG ADDON: CPT

## 2018-09-27 PROCEDURE — 99284 EMERGENCY DEPT VISIT MOD MDM: CPT | Mod: Z6 | Performed by: NURSE PRACTITIONER

## 2018-09-27 PROCEDURE — 96374 THER/PROPH/DIAG INJ IV PUSH: CPT

## 2018-09-27 PROCEDURE — 85025 COMPLETE CBC W/AUTO DIFF WBC: CPT | Performed by: NURSE PRACTITIONER

## 2018-09-27 PROCEDURE — 96361 HYDRATE IV INFUSION ADD-ON: CPT

## 2018-09-27 PROCEDURE — 99284 EMERGENCY DEPT VISIT MOD MDM: CPT | Mod: 25

## 2018-09-27 PROCEDURE — 74176 CT ABD & PELVIS W/O CONTRAST: CPT

## 2018-09-27 PROCEDURE — 25000128 H RX IP 250 OP 636: Performed by: NURSE PRACTITIONER

## 2018-09-27 RX ORDER — ONDANSETRON 2 MG/ML
4 INJECTION INTRAMUSCULAR; INTRAVENOUS EVERY 30 MIN PRN
Status: DISCONTINUED | OUTPATIENT
Start: 2018-09-27 | End: 2018-09-27 | Stop reason: HOSPADM

## 2018-09-27 RX ORDER — HYDROMORPHONE HYDROCHLORIDE 1 MG/ML
0.5 INJECTION, SOLUTION INTRAMUSCULAR; INTRAVENOUS; SUBCUTANEOUS ONCE
Status: COMPLETED | OUTPATIENT
Start: 2018-09-27 | End: 2018-09-27

## 2018-09-27 RX ORDER — KETOROLAC TROMETHAMINE 30 MG/ML
30 INJECTION, SOLUTION INTRAMUSCULAR; INTRAVENOUS ONCE
Status: COMPLETED | OUTPATIENT
Start: 2018-09-27 | End: 2018-09-27

## 2018-09-27 RX ADMIN — ONDANSETRON 4 MG: 2 INJECTION INTRAMUSCULAR; INTRAVENOUS at 13:20

## 2018-09-27 RX ADMIN — Medication 0.5 MG: at 14:36

## 2018-09-27 RX ADMIN — SODIUM CHLORIDE 1000 ML: 9 INJECTION, SOLUTION INTRAVENOUS at 13:19

## 2018-09-27 RX ADMIN — KETOROLAC TROMETHAMINE 30 MG: 30 INJECTION, SOLUTION INTRAMUSCULAR at 13:20

## 2018-09-27 ASSESSMENT — ENCOUNTER SYMPTOMS
HEADACHES: 0
LIGHT-HEADEDNESS: 0
BLOOD IN STOOL: 0
FREQUENCY: 0
DIZZINESS: 0
DIARRHEA: 0
CONSTIPATION: 0
COUGH: 0
FATIGUE: 0
HEMATURIA: 0
FLANK PAIN: 1
VOMITING: 0
CHILLS: 0
DYSURIA: 0
FEVER: 0
SHORTNESS OF BREATH: 0
NAUSEA: 0
ABDOMINAL PAIN: 0

## 2018-09-27 NOTE — ED PROVIDER NOTES
History     Chief Complaint   Patient presents with     Flank Pain     HPI  Kenya Rubalcava is a 58 year old female with history of anxiety, menieres disease, hyperlipidemia, and htn who presents to the emergency department for evaluation of left flank pain.  Symptoms ongoing for the last 2 weeks.  Pain comes and goes.  Today she had to leave work due to the pain.  Denies any nausea or vomiting.  Denies constipation or diarrhea.  Denies fever or chills.  Patient has a history of kidney stones and states this pain feels similar to her previous episodes of kidney stones.    Problem List:    Patient Active Problem List    Diagnosis Date Noted     Kidney stone 07/19/2016     Priority: Medium     Anxiety 05/14/2015     Priority: Medium     Meniere disease 11/11/2010     Priority: Medium     Diagnosed 10/10 - active in left ear. Trying dyazide daily, serax twice daily, meclizine qid        Kidney stones 06/15/2009     Priority: Medium     Bilateral noted on CT 5/23/09       Hypertriglyceridemia 06/15/2009     Priority: Medium     Hyperlipidemia LDL goal <130 06/30/2008     Priority: Medium     Recent Labs   Lab Test 11/26/10 0847 7/1/10 0927     CHOL 188 164     HDL 28* 27*     LDL Cannot estimate LDL when triglyceride exceeds 400 mg/dL93 Cannot estimate LDL when triglyceride exceeds 400 mg/dL     TRIG 443* 431*     CHOLHDLRATIO 7.0* 6.0*     11/29/10 - Just added fenofibrate and recheck in 2 months lipid/ast        Psoriasis 05/22/2008     Priority: Medium     Less than 1% body surface area - 1% ointment Triamcinalone, scalp Head&Shoulders or Nizoral Stagecoach-Smoothe FS prescriptions.       Symptomatic menopausal or female climacteric states 06/25/2007     Priority: Medium     Peripheral vertigo 09/06/2006     Priority: Medium     Problem list name updated by automated process. Provider to review       Essential hypertension, benign 05/02/2006     Priority: Medium     Anxiety disorder due to medical condition       Priority: Medium     Treatment tried:  Buspar 7.5 mg bid (stopped 03/21/2005), Celexa started 03/21/2005    Problem list name updated by automated process. Provider to review       Allergic rhinitis 06/16/2004     Priority: Medium     Environmental allergies  Problem list name updated by automated process. Provider to review       Sensorineural hearing loss 07/05/2002     Priority: Medium     Referred to Dr Street ENT Yefri Honeycutt by previous clinic  Problem list name updated by automated process. Provider to review          Past Medical History:    Past Medical History:   Diagnosis Date     Abnormal Papanicolaou smear of cervix and cervical HPV 1986     Anxiety      Anxiety disorder in conditions classified elsewhere      Calculus of kidney 08/11/1988     Calculus of ureter 06/30/1985     Excessive or frequent menstruation 07/07/2003     Labyrinthitis, unspecified      Lump or mass in breast 10/27/1999     Variants of migraine, not elsewhere classified, without mention of intractable migraine without mention of status migrainosus 02/07/2002       Past Surgical History:    Past Surgical History:   Procedure Laterality Date     CARPAL TUNNEL RELEASE RT/LT  08/2008    Left      COLONOSCOPY  8/7/2012    Procedure: COLONOSCOPY;  Colonoscopy;  Surgeon: Kailyn Lopez MD;  Location: [facility]     CYSTOSCOPY, RETROGRADES, INSERT STENT URETER(S), COMBINED Left 8/19/2016    Procedure: COMBINED CYSTOSCOPY, RETROGRADES, INSERT STENT URETER(S);  Surgeon: Terence Carpenter MD;  Location:  OR     esteem implant  06/23/2011    Hearing implant right ear.     HYSTERECTOMY, PAP NO LONGER INDICATED  12/2004    Vaginal hysterectomy     LAPAROSCOPIC ABLATION ENDOMETRIOSIS  07/2004     LASER HOLMIUM LITHOTRIPSY URETER(S), INSERT STENT, COMBINED Left 9/12/2016    Procedure: COMBINED CYSTOSCOPY, URETEROSCOPY, LASER HOLMIUM LITHOTRIPSY URETER(S), INSERT STENT;  Surgeon: Kailyn Dozier MD;  Location:  OR      LEFT wrist surgery       Sinus surgery with repair of deviated septum       TUBAL LIGATION         Family History:    Family History   Problem Relation Age of Onset     HEART DISEASE Brother      Hypertension Brother      Asthma Daughter      HEART DISEASE Brother      Hypertension Brother      C.A.D. Father      MI      Hypertension Father      Lipids Father 50     C.A.D. Paternal Aunt      MI-50s     Cancer Maternal Aunt      Ovarian CA     Cerebrovascular Disease No family hx of      Breast Cancer No family hx of      Cancer - colorectal No family hx of      Prostate Cancer No family hx of        Social History:  Marital Status:  Single [1]  Social History   Substance Use Topics     Smoking status: Never Smoker     Smokeless tobacco: Never Used     Alcohol use 0.0 oz/week     0 Standard drinks or equivalent per week      Comment: rare         Medications:      ASPIRIN 325 MG OR TBEC   atenolol (TENORMIN) 50 MG tablet   diazepam (VALIUM) 5 MG tablet   diazepam (VALIUM) 5 MG tablet   diazepam (VALIUM) 5 MG tablet   DIPHENHYDRAMINE HCL PO   meclizine (ANTIVERT) 25 MG tablet   pravastatin (PRAVACHOL) 40 MG tablet   prochlorperazine (COMPAZINE) 10 MG tablet   traZODone (DESYREL) 50 MG tablet   triamterene-hydrochlorothiazide (DYAZIDE) 37.5-25 MG per capsule   venlafaxine (EFFEXOR-XR) 150 MG 24 hr capsule   betamethasone dipropionate (DIPROSONE) 0.05 % ointment   Fluocinolone Acetonide Scalp 0.01 % OIL oil   fluticasone (FLONASE) 50 MCG/ACT nasal spray   zolpidem (AMBIEN) 10 MG tablet         Review of Systems   Constitutional: Negative for chills, fatigue and fever.   HENT: Negative for congestion.    Respiratory: Negative for cough and shortness of breath.    Cardiovascular: Negative for chest pain.   Gastrointestinal: Negative for abdominal pain, blood in stool, constipation, diarrhea, nausea and vomiting.   Genitourinary: Positive for flank pain. Negative for dysuria, frequency, hematuria, pelvic pain and urgency.    Neurological: Negative for dizziness, light-headedness and headaches.   All other systems reviewed and are negative.      Physical Exam   BP: 129/84  Pulse: 70  Temp: 98.7  F (37.1  C)  Resp: 18  SpO2: 95 %      Physical Exam   Constitutional: She is oriented to person, place, and time. She appears well-developed and well-nourished. No distress.   HENT:   Head: Normocephalic and atraumatic.   Right Ear: External ear normal.   Left Ear: External ear normal.   Nose: Nose normal.   Mouth/Throat: Oropharynx is clear and moist.   Eyes: Conjunctivae are normal.   Cardiovascular: Normal rate, regular rhythm and normal heart sounds.    No murmur heard.  Pulmonary/Chest: Effort normal and breath sounds normal.   Abdominal: Soft. Normal appearance and bowel sounds are normal. She exhibits no distension. There is no tenderness. There is CVA tenderness (left).   Musculoskeletal: Normal range of motion.   Neurological: She is alert and oriented to person, place, and time.   Skin: Skin is warm and dry.       ED Course     ED Course     Procedures         Results for orders placed or performed during the hospital encounter of 09/27/18 (from the past 24 hour(s))   UA with Microscopic   Result Value Ref Range    Color Urine Straw     Appearance Urine Clear     Glucose Urine Negative NEG^Negative mg/dL    Bilirubin Urine Negative NEG^Negative    Ketones Urine Negative NEG^Negative mg/dL    Specific Gravity Urine 1.010 1.003 - 1.035    Blood Urine Small (A) NEG^Negative    pH Urine 8.0 (H) 5.0 - 7.0 pH    Protein Albumin Urine Negative NEG^Negative mg/dL    Urobilinogen mg/dL 2.0 0.0 - 2.0 mg/dL    Nitrite Urine Negative NEG^Negative    Leukocyte Esterase Urine Negative NEG^Negative    Source Midstream Urine     WBC Urine <1 0 - 5 /HPF    RBC Urine 2 0 - 2 /HPF    Bacteria Urine Few (A) NEG^Negative /HPF    Squamous Epithelial /HPF Urine 1 0 - 1 /HPF    Mucous Urine Present (A) NEG^Negative /LPF   CBC with platelets differential    Result Value Ref Range    WBC 6.5 4.0 - 11.0 10e9/L    RBC Count 4.69 3.8 - 5.2 10e12/L    Hemoglobin 14.6 11.7 - 15.7 g/dL    Hematocrit 42.9 35.0 - 47.0 %    MCV 92 78 - 100 fl    MCH 31.1 26.5 - 33.0 pg    MCHC 34.0 31.5 - 36.5 g/dL    RDW 12.4 10.0 - 15.0 %    Platelet Count 212 150 - 450 10e9/L    Diff Method Automated Method     % Neutrophils 54.7 %    % Lymphocytes 34.3 %    % Monocytes 7.8 %    % Eosinophils 2.6 %    % Basophils 0.6 %    % Immature Granulocytes 0.0 %    Nucleated RBCs 0 0 /100    Absolute Neutrophil 3.6 1.6 - 8.3 10e9/L    Absolute Lymphocytes 2.2 0.8 - 5.3 10e9/L    Absolute Monocytes 0.5 0.0 - 1.3 10e9/L    Absolute Eosinophils 0.2 0.0 - 0.7 10e9/L    Absolute Basophils 0.0 0.0 - 0.2 10e9/L    Abs Immature Granulocytes 0.0 0 - 0.4 10e9/L    Absolute Nucleated RBC 0.0    Comprehensive metabolic panel   Result Value Ref Range    Sodium 140 133 - 144 mmol/L    Potassium 3.7 3.4 - 5.3 mmol/L    Chloride 104 94 - 109 mmol/L    Carbon Dioxide 31 20 - 32 mmol/L    Anion Gap 5 3 - 14 mmol/L    Glucose 91 70 - 99 mg/dL    Urea Nitrogen 15 7 - 30 mg/dL    Creatinine 0.79 0.52 - 1.04 mg/dL    GFR Estimate 75 >60 mL/min/1.7m2    GFR Estimate If Black >90 >60 mL/min/1.7m2    Calcium 9.4 8.5 - 10.1 mg/dL    Bilirubin Total 0.3 0.2 - 1.3 mg/dL    Albumin 4.1 3.4 - 5.0 g/dL    Protein Total 7.7 6.8 - 8.8 g/dL    Alkaline Phosphatase 66 40 - 150 U/L    ALT 47 0 - 50 U/L    AST 39 0 - 45 U/L   CT Abdomen Pelvis w/o Contrast    Narrative    CT ABDOMEN PELVIS W/O CONTRAST 9/27/2018 1:51 PM    HISTORY: Bilateral flank pain, worse on left.    TECHNIQUE: CT imaging of the abdomen and pelvis is performed without  IV contrast.    This study is particularly suited for assessing for urolithiasis or  other pathological calcifications. Abdominal organs, pelvic organs,  bowel, aorta, retroperitoneum, and abdominal wall are also assessed to  the limits of no IV contrast.  Pelvic anatomy is also assessed to the  limits  of no IV contrast.    Radiation dose for this scan is reduced using automated exposure  control, adjustment of the mA and/ kV according to patient size, or  iterative reconstruction technique.     COMPARISON:  12/26/2016.    FINDINGS:    Abdomen: There are two tiny nonobstructing calculi in the right  kidney. The largest one measures 2 mm. They were present before. There  are fewer calculi in the right kidney compared to the prior study.  There is a 2-3 mm nonobstructing calculi in the left kidney. This was  present before. Two stable tiny subcentimeter right lung base nodules  are again seen. Their stability suggests a benign entity. A tiny  gallstone is present in the gallbladder. Fatty infiltration of the  liver is suspected.    Pelvis : No bladder calculi are seen.      Impression    IMPRESSION:    1. No ureterolithiasis or hydronephrosis.  2. Tiny nonobstructing bilateral renal calculi are again seen.    TANJA MARMOLEJO MD       Medications   0.9% sodium chloride BOLUS (0 mLs Intravenous Stopped 9/27/18 1529)   ketorolac (TORADOL) injection 30 mg (30 mg Intravenous Given 9/27/18 1320)   HYDROmorphone (PF) (DILAUDID) injection 0.5 mg (0.5 mg Intravenous Given 9/27/18 1436)       Assessments & Plan (with Medical Decision Making)   58-year-old female with 2 weeks of on and off left flank pain.  Pain increased today and was more persistent.  Patient has a history of kidney stones.  Denies urinary symptoms.  Denies fever.  On exam patient has slight CVA tenderness to the left flank, otherwise abnormal exam is unremarkable.  Patient is pacing in the room, in pain.  CBC is normal.  Electrolytes are normal.  LFTs are normal.  Kidney function tests are normal.  Urinalysis reveals small blood, and few bacteria.  No evidence for UTI.  Abdominal CT/stone technique was obtained to evaluate for the presence of kidney stone in the ureter or bladder.  CT reveals no ureteral lithiasis or hydronephrosis.  There is a tiny  nonobstructing bilateral renal calculi that are again seen and were present on previous imaging.  Patient received IV normal saline 1 L bolus, IV Toradol, and IV Dilaudid with improvement in her pain.  I reviewed labs and imaging findings today.  Reassurance was provided.  I see no cause for her flank pain as there is no kidney stone in the ureter or bladder.  It is possible that patient could have passed a kidney stone when she voided here today.  Patient was instructed to follow-up with her primary care provider as needed.  Return to the emergency department for fever, abdominal pain, vomiting, unable to keep fluids down, or worsening pain.      I have reviewed the nursing notes.    I have reviewed the findings, diagnosis, plan and need for follow up with the patient.    Discharge Medication List as of 9/27/2018  3:29 PM          Final diagnoses:   Flank pain       9/27/2018   [facility] EMERGENCY DEPARTMENT     Vee Junior APRN CNP  09/27/18 1847

## 2018-09-27 NOTE — ED AVS SNAPSHOT
[facility] Emergency Department    [address] [address]    Phone:  [phone]    Fax:  [phone]                                       Kenya Rubalcava   MRN: [id number]    Department:  [facility] Emergency Department   Date of Visit:  9/27/2018           Patient Information     Date Of Birth          1960        Your diagnoses for this visit were:     Flank pain        You were seen by Vee Junior APRN CNP.      Follow-up Information     Follow up with Carlie Real MD. Schedule an appointment as soon as possible for a visit in 4 days.    Specialty:  Family Practice    Contact information:    [address] [facility] [address]  [phone]          Discharge Instructions       Tylenol or Ibuprofen for pain.  Drink plenty of fluids.  Appointment to see your regular doctor next Monday or Tuesday for recheck.  Return to the emergency department for fever, abdominal pain, vomiting, or any other new or worsening symptoms.    24 Hour Appointment Hotline       To make an appointment at any [facility] clinic, call [phone] ([phone]). If you don't have a family doctor or clinic, we will help you find one. [facility] clinics are conveniently located to serve the needs of you and your family.             Review of your medicines      Our records show that you are taking the medicines listed below. If these are incorrect, please call your family doctor or clinic.        Dose / Directions Last dose taken    aspirin 325 MG EC tablet        one tablet by mouth daily (buffered)   Refills:  0        atenolol 50 MG tablet   Commonly known as:  TENORMIN   Quantity:  90 tablet        TAKE 1 TABLET BY MOUTH ONCE DAILY   Refills:  1        betamethasone dipropionate 0.05 % ointment   Commonly known as:  DIPROSONE   Quantity:  45 g        Apply sparingly to affected area twice daily as needed.  Do not apply to face.   Refills:  0        * diazepam 5 MG tablet    Commonly known as:  VALIUM   Quantity:  60 tablet        Take 1 as needed for menieres daily   Refills:  0        * diazepam 5 MG tablet   Commonly known as:  VALIUM   Dose:  5 mg   Quantity:  60 tablet        Take 1 tablet (5 mg) by mouth every 12 hours as needed for anxiety or sleep   Refills:  3        * diazepam 5 MG tablet   Commonly known as:  VALIUM   Quantity:  60 tablet        TAKE 1 TABLET (5 MG) BY MOUTH EVERY 12 HOURS AS NEEDED FRO ANXIETY OR SLEEP   Refills:  2        DIPHENHYDRAMINE HCL PO   Dose:  25 mg        Take 25 mg by mouth daily as needed (for Menieres disease)   Refills:  0        Fluocinolone Acetonide Scalp 0.01 % Oil oil   Quantity:  120 mL        Apply topically 2 times daily as needed   Refills:  3        fluticasone 50 MCG/ACT spray   Commonly known as:  FLONASE   Dose:  1-2 spray   Quantity:  3 Package        Spray 1-2 sprays into both nostrils daily   Refills:  1        meclizine 25 MG tablet   Commonly known as:  ANTIVERT   Dose:  50 mg   Quantity:  60 tablet        Take 2 tablets by mouth 2 times daily as needed.   Refills:  0        pravastatin 40 MG tablet   Commonly known as:  PRAVACHOL   Quantity:  90 tablet        TAKE ONE TABLET BY MOUTH EVERY DAY AT BEDTIME   Refills:  3        prochlorperazine 10 MG tablet   Commonly known as:  COMPAZINE   Dose:  5 mg   Quantity:  10 tablet        Take 0.5 tablets (5 mg) by mouth every 6 hours as needed for nausea or vomiting   Refills:  1        traZODone 50 MG tablet   Commonly known as:  DESYREL   Quantity:  270 tablet        Take 1-3 tablets ( mg) by mouth nightly At Bedtime   Refills:  1        triamterene-hydrochlorothiazide 37.5-25 MG per capsule   Commonly known as:  DYAZIDE   Quantity:  90 capsule        take 1 capsule by mouth once daily in the morning   Refills:  0        venlafaxine 150 MG 24 hr capsule   Commonly known as:  EFFEXOR-XR   Quantity:  90 capsule        TAKE 1 CAPSULE BY MOUTH ONCE DAILY   Refills:  0         zolpidem 10 MG tablet   Commonly known as:  AMBIEN   Quantity:  30 tablet        TAKE 1 TABLET BY MOUTH NIGHTLY AT BEDTIME AS NEEDED FOR SLEEP   Refills:  0        * Notice:  This list has 3 medication(s) that are the same as other medications prescribed for you. Read the directions carefully, and ask your doctor or other care provider to review them with you.            Procedures and tests performed during your visit     CBC with platelets differential    CT Abdomen Pelvis w/o Contrast    Comprehensive metabolic panel    UA with Microscopic      Orders Needing Specimen Collection     None      Pending Results     No orders found from 9/25/2018 to 9/28/2018.            Pending Culture Results     No orders found from 9/25/2018 to 9/28/2018.            Pending Results Instructions     If you had any lab results that were not finalized at the time of your Discharge, you can call the ED Lab Result RN at [phone]. You will be contacted by this team for any positive Lab results or changes in treatment. The nurses are available 7 days a week from 10A to 6:30P.  You can leave a message 24 hours per day and they will return your call.        Test Results From Your Hospital Stay        9/27/2018  1:08 PM      Component Results     Component Value Ref Range & Units Status    Color Urine Straw  Final    Appearance Urine Clear  Final    Glucose Urine Negative NEG^Negative mg/dL Final    Bilirubin Urine Negative NEG^Negative Final    Ketones Urine Negative NEG^Negative mg/dL Final    Specific Gravity Urine 1.010 1.003 - 1.035 Final    Blood Urine Small (A) NEG^Negative Final    pH Urine 8.0 (H) 5.0 - 7.0 pH Final    Protein Albumin Urine Negative NEG^Negative mg/dL Final    Urobilinogen mg/dL 2.0 0.0 - 2.0 mg/dL Final    Nitrite Urine Negative NEG^Negative Final    Leukocyte Esterase Urine Negative NEG^Negative Final    Source Midstream Urine  Final    WBC Urine <1 0 - 5 /HPF Final    RBC Urine 2 0 - 2 /HPF Final     Bacteria Urine Few (A) NEG^Negative /HPF Final    Squamous Epithelial /HPF Urine 1 0 - 1 /HPF Final    Mucous Urine Present (A) NEG^Negative /LPF Final         9/27/2018  1:21 PM      Component Results     Component Value Ref Range & Units Status    WBC 6.5 4.0 - 11.0 10e9/L Final    RBC Count 4.69 3.8 - 5.2 10e12/L Final    Hemoglobin 14.6 11.7 - 15.7 g/dL Final    Hematocrit 42.9 35.0 - 47.0 % Final    MCV 92 78 - 100 fl Final    MCH 31.1 26.5 - 33.0 pg Final    MCHC 34.0 31.5 - 36.5 g/dL Final    RDW 12.4 10.0 - 15.0 % Final    Platelet Count 212 150 - 450 10e9/L Final    Diff Method Automated Method  Final    % Neutrophils 54.7 % Final    % Lymphocytes 34.3 % Final    % Monocytes 7.8 % Final    % Eosinophils 2.6 % Final    % Basophils 0.6 % Final    % Immature Granulocytes 0.0 % Final    Nucleated RBCs 0 0 /100 Final    Absolute Neutrophil 3.6 1.6 - 8.3 10e9/L Final    Absolute Lymphocytes 2.2 0.8 - 5.3 10e9/L Final    Absolute Monocytes 0.5 0.0 - 1.3 10e9/L Final    Absolute Eosinophils 0.2 0.0 - 0.7 10e9/L Final    Absolute Basophils 0.0 0.0 - 0.2 10e9/L Final    Abs Immature Granulocytes 0.0 0 - 0.4 10e9/L Final    Absolute Nucleated RBC 0.0  Final         9/27/2018  1:46 PM      Component Results     Component Value Ref Range & Units Status    Sodium 140 133 - 144 mmol/L Final    Potassium 3.7 3.4 - 5.3 mmol/L Final    Chloride 104 94 - 109 mmol/L Final    Carbon Dioxide 31 20 - 32 mmol/L Final    Anion Gap 5 3 - 14 mmol/L Final    Glucose 91 70 - 99 mg/dL Final    Urea Nitrogen 15 7 - 30 mg/dL Final    Creatinine 0.79 0.52 - 1.04 mg/dL Final    GFR Estimate 75 >60 mL/min/1.7m2 Final    Non  GFR Calc    GFR Estimate If Black >90 >60 mL/min/1.7m2 Final    African American GFR Calc    Calcium 9.4 8.5 - 10.1 mg/dL Final    Bilirubin Total 0.3 0.2 - 1.3 mg/dL Final    Albumin 4.1 3.4 - 5.0 g/dL Final    Protein Total 7.7 6.8 - 8.8 g/dL Final    Alkaline Phosphatase 66 40 - 150 U/L Final    ALT 47  0 - 50 U/L Final    AST 39 0 - 45 U/L Final         9/27/2018  2:43 PM      Narrative     CT ABDOMEN PELVIS W/O CONTRAST 9/27/2018 1:51 PM    HISTORY: Bilateral flank pain, worse on left.    TECHNIQUE: CT imaging of the abdomen and pelvis is performed without  IV contrast.    This study is particularly suited for assessing for urolithiasis or  other pathological calcifications. Abdominal organs, pelvic organs,  bowel, aorta, retroperitoneum, and abdominal wall are also assessed to  the limits of no IV contrast.  Pelvic anatomy is also assessed to the  limits of no IV contrast.    Radiation dose for this scan is reduced using automated exposure  control, adjustment of the mA and/ kV according to patient size, or  iterative reconstruction technique.     COMPARISON:  12/26/2016.    FINDINGS:    Abdomen: There are two tiny nonobstructing calculi in the right  kidney. The largest one measures 2 mm. They were present before. There  are fewer calculi in the right kidney compared to the prior study.  There is a 2-3 mm nonobstructing calculi in the left kidney. This was  present before. Two stable tiny subcentimeter right lung base nodules  are again seen. Their stability suggests a benign entity. A tiny  gallstone is present in the gallbladder. Fatty infiltration of the  liver is suspected.    Pelvis : No bladder calculi are seen.        Impression     IMPRESSION:    1. No ureterolithiasis or hydronephrosis.  2. Tiny nonobstructing bilateral renal calculi are again seen.    TANJA MARMOLEJO MD                Thank you for choosing [facility]       Thank you for choosing [facility] for your care. Our goal is always to provide you with excellent care. Hearing back from our patients is one way we can continue to improve our services. Please take a few minutes to complete the written survey that you may receive in the mail after you visit with us. Thank you!        [employer] Information     [employer] It gives you secure access to your  electronic health record. If you see a primary care provider, you can also send messages to your care team and make appointments. If you have questions, please call your primary care clinic.  If you do not have a primary care provider, please call [phone] and they will assist you.        Care EveryWhere ID     This is your Care EveryWhere ID. This could be used by other organizations to access your [facility] medical records  [id number]        Equal Access to Services     SHILOH AMBROSE : Rudolph Menendez, saranya goetz, hasmukh bolden, mary timmons. [facility] [phone].    ATENCIÓN: Si habla español, tiene a avalos disposición servicios gratuitos de asistencia lingüística. Llame al [phone].    We comply with applicable federal civil rights laws and [place] laws. We do not discriminate on the basis of race, color, national origin, age, disability, sex, sexual orientation, or gender identity.            After Visit Summary       This is your record. Keep this with you and show to your community pharmacist(s) and doctor(s) at your next visit.

## 2018-09-27 NOTE — ED AVS SNAPSHOT
[facility] Emergency Department    [address] [address]    Phone:  [phone]    Fax:  [phone]                                       Kenya Rubalcava   MRN: [id number]    Department:  [facility] Emergency Department   Date of Visit:  9/27/2018           After Visit Summary Signature Page     I have received my discharge instructions, and my questions have been answered. I have discussed any challenges I see with this plan with the nurse or doctor.    ..........................................................................................................................................  Patient/Patient Representative Signature      ..........................................................................................................................................  Patient Representative Print Name and Relationship to Patient    ..................................................               ................................................  Date                                   Time    ..........................................................................................................................................  Reviewed by Signature/Title    ...................................................              ..............................................  Date                                               Time          22EPIC Rev 08/18

## 2018-09-27 NOTE — DISCHARGE INSTRUCTIONS
Tylenol or Ibuprofen for pain.  Drink plenty of fluids.  Appointment to see your regular doctor next Monday or Tuesday for recheck.  Return to the emergency department for fever, abdominal pain, vomiting, or any other new or worsening symptoms.

## 2018-11-09 ENCOUNTER — TELEPHONE (OUTPATIENT)
Dept: FAMILY MEDICINE | Facility: CLINIC | Age: 58
End: 2018-11-09

## 2018-11-09 DIAGNOSIS — L60.0 INGROWN TOENAIL: Primary | ICD-10-CM

## 2018-11-09 NOTE — TELEPHONE ENCOUNTER
.Reason for Call: Request for an order or referral:    Order or referral being requested: [facility] been [facility] for infected ingrown toenail.  She was advised that she should see a podiatrist.  She has appointment with Dr. Trevino on 11/19/18 and was hoping you would approve a referral.  Please review and place order if appropriate.  Thank you...Evette Ricks    Date needed: as soon as possible    Has the patient been seen by the PCP for this problem? YES    Phone number Patient can be reached at:  Home number on file [phone] (home)    Best Time:  Any time    Can we leave a detailed message on this number?  YES    Call taken on 11/9/2018 at 3:16 PM by Evette Ricks

## 2018-11-19 ENCOUNTER — OFFICE VISIT (OUTPATIENT)
Dept: PODIATRY | Facility: CLINIC | Age: 58
End: 2018-11-19
Payer: COMMERCIAL

## 2018-11-19 VITALS
BODY MASS INDEX: 34.55 KG/M2 | HEART RATE: 59 BPM | HEIGHT: 63 IN | WEIGHT: 195 LBS | DIASTOLIC BLOOD PRESSURE: 74 MMHG | SYSTOLIC BLOOD PRESSURE: 118 MMHG

## 2018-11-19 DIAGNOSIS — L60.0 INGROWN TOENAIL: Primary | ICD-10-CM

## 2018-11-19 PROCEDURE — 11750 EXCISION NAIL&NAIL MATRIX: CPT | Mod: TA | Performed by: PODIATRIST

## 2018-11-19 PROCEDURE — 11750 EXCISION NAIL&NAIL MATRIX: CPT | Mod: 59 | Performed by: PODIATRIST

## 2018-11-19 PROCEDURE — 99203 OFFICE O/P NEW LOW 30 MIN: CPT | Mod: 25 | Performed by: PODIATRIST

## 2018-11-19 NOTE — LETTER
11/19/2018         RE: Kenya Rubalcava  [address] [place] [address]        Dear Colleague,    Thank you for referring your patient, Kenya Rubalcava, to the [facility]. Please see a copy of my visit note below.    Kenya Rubalcava is a 58 year old female who presents with a chief complain of a painful ingrown toenail to the right and left great toe.  The patient relates the ingrown nail was first noticed several weeks ago and has steadily become worse.  The patient relates the medial nail border is inflamed and sore to the touch.  The patient relates no bloody drainage.  The patient denies any redness extending up the big toe.  The patient has been treating the big toe by soaking it in salt water and antibiotics with no relief.         REVIEW OF SYSTEMS:  Constitutional, HEENT, cardiovascular, pulmonary, GI, , musculoskeletal, neuro, skin, endocrine and psych systems are negative, except as otherwise noted.     PAST MEDICAL HISTORY:   Past Medical History:   Diagnosis Date     Abnormal Papanicolaou smear of cervix and cervical HPV 1986     Anxiety      Anxiety disorder in conditions classified elsewhere      Calculus of kidney 08/11/1988    Calcium renal stone, oxylate     Calculus of ureter 06/30/1985    LEFT urteral stone     Excessive or frequent menstruation 07/07/2003     Labyrinthitis, unspecified      Lump or mass in breast 10/27/1999    RIGHT breast mass     Variants of migraine, not elsewhere classified, without mention of intractable migraine without mention of status migrainosus 02/07/2002        PAST SURGICAL HISTORY:   Past Surgical History:   Procedure Laterality Date     CARPAL TUNNEL RELEASE RT/LT  08/2008    Left      COLONOSCOPY  8/7/2012    Procedure: COLONOSCOPY;  Colonoscopy;  Surgeon: Kailyn Lopez MD;  Location: [facility]     CYSTOSCOPY, RETROGRADES, INSERT STENT URETER(S), COMBINED Left 8/19/2016    Procedure: COMBINED CYSTOSCOPY,  RETROGRADES, INSERT STENT URETER(S);  Surgeon: Terence Carpenter MD;  [facility]     esteem implant  06/23/2011    Hearing implant right ear.     HYSTERECTOMY, PAP NO LONGER INDICATED  12/2004    Vaginal hysterectomy     LAPAROSCOPIC ABLATION ENDOMETRIOSIS  07/2004     LASER HOLMIUM LITHOTRIPSY URETER(S), INSERT STENT, COMBINED Left 9/12/2016    Procedure: COMBINED CYSTOSCOPY, URETEROSCOPY, LASER HOLMIUM LITHOTRIPSY URETER(S), INSERT STENT;  Surgeon: Kailyn Dozier MD;  [facility]     LEFT wrist surgery       Sinus surgery with repair of deviated septum       TUBAL LIGATION          MEDICATIONS:   Current Outpatient Prescriptions:      ASPIRIN 325 MG OR TBEC, one tablet by mouth daily (buffered), Disp: , Rfl:      atenolol (TENORMIN) 50 MG tablet, TAKE 1 TABLET BY MOUTH ONCE DAILY, Disp: 90 tablet, Rfl: 1     betamethasone dipropionate (DIPROSONE) 0.05 % ointment, Apply sparingly to affected area twice daily as needed.  Do not apply to face., Disp: 45 g, Rfl: 0     diazepam (VALIUM) 5 MG tablet, TAKE 1 TABLET (5 MG) BY MOUTH EVERY 12 HOURS AS NEEDED FRO ANXIETY OR SLEEP , Disp: 60 tablet, Rfl: 2     diazepam (VALIUM) 5 MG tablet, Take 1 tablet (5 mg) by mouth every 12 hours as needed for anxiety or sleep, Disp: 60 tablet, Rfl: 3     diazepam (VALIUM) 5 MG tablet, Take 1 as needed for menieres daily, Disp: 60 tablet, Rfl: 0     DIPHENHYDRAMINE HCL PO, Take 25 mg by mouth daily as needed (for Menieres disease), Disp: , Rfl:      Fluocinolone Acetonide Scalp 0.01 % OIL oil, Apply topically 2 times daily as needed, Disp: 120 mL, Rfl: 3     fluticasone (FLONASE) 50 MCG/ACT nasal spray, Spray 1-2 sprays into both nostrils daily, Disp: 3 Package, Rfl: 1     meclizine (ANTIVERT) 25 MG tablet, Take 2 tablets by mouth 2 times daily as needed., Disp: 60 tablet, Rfl: 0     pravastatin (PRAVACHOL) 40 MG tablet, TAKE ONE TABLET BY MOUTH EVERY DAY AT BEDTIME, Disp: 90 tablet, Rfl: 3     prochlorperazine  (COMPAZINE) 10 MG tablet, Take 0.5 tablets (5 mg) by mouth every 6 hours as needed for nausea or vomiting, Disp: 10 tablet, Rfl: 1     traZODone (DESYREL) 50 MG tablet, Take 1-3 tablets ( mg) by mouth nightly At Bedtime, Disp: 270 tablet, Rfl: 1     triamterene-hydrochlorothiazide (DYAZIDE) 37.5-25 MG per capsule, take 1 capsule by mouth once daily in the morning, Disp: 90 capsule, Rfl: 0     venlafaxine (EFFEXOR-XR) 150 MG 24 hr capsule, TAKE 1 CAPSULE BY MOUTH ONCE DAILY, Disp: 90 capsule, Rfl: 0     zolpidem (AMBIEN) 10 MG tablet, TAKE 1 TABLET BY MOUTH NIGHTLY AT BEDTIME AS NEEDED FOR SLEEP, Disp: 30 tablet, Rfl: 0     ALLERGIES:    Allergies   Allergen Reactions     Ivp Dye [Contrast Dye] Anaphylaxis and Hives     Cats Anaphylaxis     Niaspan [Niacin] Rash     Pollen Extract/Tree Extract         SOCIAL HISTORY:   Social History     Social History     Marital status: Single     Spouse name: N/A     Number of children: 2     Years of education: 18     Occupational History     Teacher Other     [employer]     Social History Main Topics     Smoking status: Never Smoker     Smokeless tobacco: Never Used     Alcohol use 0.0 oz/week     0 Standard drinks or equivalent per week      Comment: rare      Drug use: No     Sexual activity: Yes     Partners: Male     Birth control/ protection: Surgical      Comment: Hysterectomy     Other Topics Concern     Seat Belt Yes     Parent/Sibling W/ Cabg, Mi Or Angioplasty Before 65f 55m? No     Social History Narrative        FAMILY HISTORY:   Family History   Problem Relation Age of Onset     HEART DISEASE Brother      Hypertension Brother      Asthma Daughter      HEART DISEASE Brother      Hypertension Brother      C.A.D. Father      MI      Hypertension Father      Lipids Father 50     C.A.D. Paternal Aunt      MI-50s     Cancer Maternal Aunt      Ovarian CA     Cerebrovascular Disease No family hx of      Breast Cancer No family hx of      Cancer  "- colorectal No family hx of      Prostate Cancer No family hx of         EXAM:Vitals: /74 (BP Location: Left arm, Patient Position: Sitting, Cuff Size: Adult Large)  Pulse 59  Ht 5' 3\" (1.6 m)  Wt 195 lb (88.5 kg)  BMI 34.54 kg/m2  BMI= Body mass index is 34.54 kg/(m^2).  Weight management plan: Patient was referred to their PCP to discuss a diet and exercise plan.    General appearance: Patient is alert and fully cooperative with history & exam.  No sign of distress is noted during the visit.     Psychiatric: Affect is pleasant & appropriate.  Patient appears motivated to improve health.     Respiratory: Breathing is regular & unlabored while sitting.     HEENT: Hearing is intact to spoken word.  Speech is clear.  No gross evidence of visual impairment that would impact ambulation.     Dermatologic: Skin is intact to both lower extremities without significant lesions, rash or abrasion.  Noted paronychia.     Vascular: DP & PT pulses are intact & regular bilaterally.  No significant edema or varicosities noted.  CFT and skin temperature is normal to both lower extremities.     Neurologic: Lower extremity sensation is intact to light touch.  No evidence of weakness or contracture in the lower extremities.  No evidence of neuropathy.     Musculoskeletal: Patient is ambulatory without assistive device or brace.  No gross ankle deformity noted.  No foot or ankle joint effusion is noted.    One notes an inflamed medial nail border of the right and left great toe.  There is noted erythema and edema located around the medial labial fold and does not extend past the interphalangeal joint.  There is no apparent purulent drainage noted.  There is pain on palpation to the proximal aspect of the medial nail border.      Assessment:  1.  Paranychia to the medial aspect of the right and left great toe.      Plan:  I have explained to Kenya about the condition.  The potential causes and nature of an ingrown toenail " were discussed with the patient.  We reviewed the natural history/prognosis of the condition and potential risks if no treatment is provided.      Treatment options discussed included conservative management (oral antibiotics, soaking of foot, adequate width shoes)  as well as surgical management (partial or total nail removal).  The pros and cons of both forms of treatment were reviewed.      After thorough discussion and answering all questions, the patient elected to proceed with surgery.    At this point, I recommended having the offending nail border permanently removed.  I have explained to the patient all of the possible risks, benefits, alternatives to the procedure.  The patient consented to the proposed procedure.  The right and left hallux was swabbed with alcohol.  Next, approximately 5 cc of 1% lidocaine plain was injected around the right and left hallux.  The right and left hallux was then prepped with Betadine ointment.  A tourniquet was applied to the right and left hallux.  The offending nail border was split using an English anvil back to the matrix.  Next, utilizing a straight hemostat the offending nail border was avulsed with the attached matrix.  The wound bed was debrided on any remaining nail, matrix and skin.  Next, the offending nail border was treated with 89% phenol using microtip cotton applicators for 30 seconds.  The wound was then irrigated and dressed with Triple antibiotic ointment and a compressive dry sterile dressing.  The tourniquet was removed and a prompt hyperemic response was noted.  The patient tolerated the procedure well with no complications.  The patient was given post procedure instructions for the care of the wound.      Disclaimer: This note consists of symbols derived from keyboarding, dictation and/or voice recognition software. As a result, there may be errors in the script that have gone undetected. Please consider this when interpreting information found in  this chart.      MARNIE Trevino D.P.M., RON.F.A.S.        Again, thank you for allowing me to participate in the care of your patient.        Sincerely,        Edwin Trevino DPM

## 2018-11-19 NOTE — PROGRESS NOTES
eKnya Rubalcava is a 58 year old female who presents with a chief complain of a painful ingrown toenail to the right and left great toe.  The patient relates the ingrown nail was first noticed several weeks ago and has steadily become worse.  The patient relates the medial nail border is inflamed and sore to the touch.  The patient relates no bloody drainage.  The patient denies any redness extending up the big toe.  The patient has been treating the big toe by soaking it in salt water and antibiotics with no relief.         REVIEW OF SYSTEMS:  Constitutional, HEENT, cardiovascular, pulmonary, GI, , musculoskeletal, neuro, skin, endocrine and psych systems are negative, except as otherwise noted.     PAST MEDICAL HISTORY:   Past Medical History:   Diagnosis Date     Abnormal Papanicolaou smear of cervix and cervical HPV 1986     Anxiety      Anxiety disorder in conditions classified elsewhere      Calculus of kidney 08/11/1988    Calcium renal stone, oxylate     Calculus of ureter 06/30/1985    LEFT urteral stone     Excessive or frequent menstruation 07/07/2003     Labyrinthitis, unspecified      Lump or mass in breast 10/27/1999    RIGHT breast mass     Variants of migraine, not elsewhere classified, without mention of intractable migraine without mention of status migrainosus 02/07/2002        PAST SURGICAL HISTORY:   Past Surgical History:   Procedure Laterality Date     CARPAL TUNNEL RELEASE RT/LT  08/2008    Left      COLONOSCOPY  8/7/2012    Procedure: COLONOSCOPY;  Colonoscopy;  Surgeon: Kailyn Lopez MD;  Location: [facility]     CYSTOSCOPY, RETROGRADES, INSERT STENT URETER(S), COMBINED Left 8/19/2016    Procedure: COMBINED CYSTOSCOPY, RETROGRADES, INSERT STENT URETER(S);  Surgeon: Terence Carpenter MD;  Location:  OR     esteem implant  06/23/2011    Hearing implant right ear.     HYSTERECTOMY, PAP NO LONGER INDICATED  12/2004    Vaginal hysterectomy     LAPAROSCOPIC ABLATION  ENDOMETRIOSIS  07/2004     LASER HOLMIUM LITHOTRIPSY URETER(S), INSERT STENT, COMBINED Left 9/12/2016    Procedure: COMBINED CYSTOSCOPY, URETEROSCOPY, LASER HOLMIUM LITHOTRIPSY URETER(S), INSERT STENT;  Surgeon: Kailyn Dozier MD;  [facility]     LEFT wrist surgery       Sinus surgery with repair of deviated septum       TUBAL LIGATION          MEDICATIONS:   Current Outpatient Prescriptions:      ASPIRIN 325 MG OR TBEC, one tablet by mouth daily (buffered), Disp: , Rfl:      atenolol (TENORMIN) 50 MG tablet, TAKE 1 TABLET BY MOUTH ONCE DAILY, Disp: 90 tablet, Rfl: 1     betamethasone dipropionate (DIPROSONE) 0.05 % ointment, Apply sparingly to affected area twice daily as needed.  Do not apply to face., Disp: 45 g, Rfl: 0     diazepam (VALIUM) 5 MG tablet, TAKE 1 TABLET (5 MG) BY MOUTH EVERY 12 HOURS AS NEEDED FRO ANXIETY OR SLEEP , Disp: 60 tablet, Rfl: 2     diazepam (VALIUM) 5 MG tablet, Take 1 tablet (5 mg) by mouth every 12 hours as needed for anxiety or sleep, Disp: 60 tablet, Rfl: 3     diazepam (VALIUM) 5 MG tablet, Take 1 as needed for menieres daily, Disp: 60 tablet, Rfl: 0     DIPHENHYDRAMINE HCL PO, Take 25 mg by mouth daily as needed (for Menieres disease), Disp: , Rfl:      Fluocinolone Acetonide Scalp 0.01 % OIL oil, Apply topically 2 times daily as needed, Disp: 120 mL, Rfl: 3     fluticasone (FLONASE) 50 MCG/ACT nasal spray, Spray 1-2 sprays into both nostrils daily, Disp: 3 Package, Rfl: 1     meclizine (ANTIVERT) 25 MG tablet, Take 2 tablets by mouth 2 times daily as needed., Disp: 60 tablet, Rfl: 0     pravastatin (PRAVACHOL) 40 MG tablet, TAKE ONE TABLET BY MOUTH EVERY DAY AT BEDTIME, Disp: 90 tablet, Rfl: 3     prochlorperazine (COMPAZINE) 10 MG tablet, Take 0.5 tablets (5 mg) by mouth every 6 hours as needed for nausea or vomiting, Disp: 10 tablet, Rfl: 1     traZODone (DESYREL) 50 MG tablet, Take 1-3 tablets ( mg) by mouth nightly At Bedtime, Disp: 270 tablet, Rfl: 1     " triamterene-hydrochlorothiazide (DYAZIDE) 37.5-25 MG per capsule, take 1 capsule by mouth once daily in the morning, Disp: 90 capsule, Rfl: 0     venlafaxine (EFFEXOR-XR) 150 MG 24 hr capsule, TAKE 1 CAPSULE BY MOUTH ONCE DAILY, Disp: 90 capsule, Rfl: 0     zolpidem (AMBIEN) 10 MG tablet, TAKE 1 TABLET BY MOUTH NIGHTLY AT BEDTIME AS NEEDED FOR SLEEP, Disp: 30 tablet, Rfl: 0     ALLERGIES:    Allergies   Allergen Reactions     Ivp Dye [Contrast Dye] Anaphylaxis and Hives     Cats Anaphylaxis     Niaspan [Niacin] Rash     Pollen Extract/Tree Extract         SOCIAL HISTORY:   Social History     Social History     Marital status: Single     Spouse name: N/A     Number of children: 2     Years of education: 18     Occupational History     Teacher Other     [employer]     Social History Main Topics     Smoking status: Never Smoker     Smokeless tobacco: Never Used     Alcohol use 0.0 oz/week     0 Standard drinks or equivalent per week      Comment: rare      Drug use: No     Sexual activity: Yes     Partners: Male     Birth control/ protection: Surgical      Comment: Hysterectomy     Other Topics Concern     Seat Belt Yes     Parent/Sibling W/ Cabg, Mi Or Angioplasty Before 65f 55m? No     Social History Narrative        FAMILY HISTORY:   Family History   Problem Relation Age of Onset     HEART DISEASE Brother      Hypertension Brother      Asthma Daughter      HEART DISEASE Brother      Hypertension Brother      C.A.D. Father      MI      Hypertension Father      Lipids Father 50     C.A.D. Paternal Aunt      MI-50s     Cancer Maternal Aunt      Ovarian CA     Cerebrovascular Disease No family hx of      Breast Cancer No family hx of      Cancer - colorectal No family hx of      Prostate Cancer No family hx of         EXAM:Vitals: /74 (BP Location: Left arm, Patient Position: Sitting, Cuff Size: Adult Large)  Pulse 59  Ht 5' 3\" (1.6 m)  Wt 195 lb (88.5 kg)  BMI 34.54 kg/m2  BMI= Body " mass index is 34.54 kg/(m^2).  Weight management plan: Patient was referred to their PCP to discuss a diet and exercise plan.    General appearance: Patient is alert and fully cooperative with history & exam.  No sign of distress is noted during the visit.     Psychiatric: Affect is pleasant & appropriate.  Patient appears motivated to improve health.     Respiratory: Breathing is regular & unlabored while sitting.     HEENT: Hearing is intact to spoken word.  Speech is clear.  No gross evidence of visual impairment that would impact ambulation.     Dermatologic: Skin is intact to both lower extremities without significant lesions, rash or abrasion.  Noted paronychia.     Vascular: DP & PT pulses are intact & regular bilaterally.  No significant edema or varicosities noted.  CFT and skin temperature is normal to both lower extremities.     Neurologic: Lower extremity sensation is intact to light touch.  No evidence of weakness or contracture in the lower extremities.  No evidence of neuropathy.     Musculoskeletal: Patient is ambulatory without assistive device or brace.  No gross ankle deformity noted.  No foot or ankle joint effusion is noted.    One notes an inflamed medial nail border of the right and left great toe.  There is noted erythema and edema located around the medial labial fold and does not extend past the interphalangeal joint.  There is no apparent purulent drainage noted.  There is pain on palpation to the proximal aspect of the medial nail border.      Assessment:  1.  Paranychia to the medial aspect of the right and left great toe.      Plan:  I have explained to Kenya about the condition.  The potential causes and nature of an ingrown toenail were discussed with the patient.  We reviewed the natural history/prognosis of the condition and potential risks if no treatment is provided.      Treatment options discussed included conservative management (oral antibiotics, soaking of foot, adequate  width shoes)  as well as surgical management (partial or total nail removal).  The pros and cons of both forms of treatment were reviewed.      After thorough discussion and answering all questions, the patient elected to proceed with surgery.    At this point, I recommended having the offending nail border permanently removed.  I have explained to the patient all of the possible risks, benefits, alternatives to the procedure.  The patient consented to the proposed procedure.  The right and left hallux was swabbed with alcohol.  Next, approximately 5 cc of 1% lidocaine plain was injected around the right and left hallux.  The right and left hallux was then prepped with Betadine ointment.  A tourniquet was applied to the right and left hallux.  The offending nail border was split using an English anvil back to the matrix.  Next, utilizing a straight hemostat the offending nail border was avulsed with the attached matrix.  The wound bed was debrided on any remaining nail, matrix and skin.  Next, the offending nail border was treated with 89% phenol using microtip cotton applicators for 30 seconds.  The wound was then irrigated and dressed with Triple antibiotic ointment and a compressive dry sterile dressing.  The tourniquet was removed and a prompt hyperemic response was noted.  The patient tolerated the procedure well with no complications.  The patient was given post procedure instructions for the care of the wound.      Disclaimer: This note consists of symbols derived from keyboarding, dictation and/or voice recognition software. As a result, there may be errors in the script that have gone undetected. Please consider this when interpreting information found in this chart.      MARNIE Trevino D.P.M., [email or web address].

## 2018-11-19 NOTE — MR AVS SNAPSHOT
After Visit Summary   11/19/2018    Kenya Rubalcava    MRN: [id number]           Patient Information     Date Of Birth          1960        Visit Information        Provider Department      11/19/2018 2:40 PM Edwin Trevino DPM [facility]        Care Instructions    Post toenail procedure instructions:    1.  Keep bandage on the rest of the day; take off in the morning.  2.  Soak the toe in warm water with Epsom's Salt or Dreft detergent for 20 min.  3.  Clean out any scab tissue from the treated area with a soapy cloth.  4.  Apply a topical antibiotic to the treated area and bandage with a Band-Aid.  5.  Repeat morning and night for two weeks            Follow-ups after your visit        Who to contact     If you have questions or need follow up information about today's clinic visit or your schedule please contact [facility] directly at [phone].  Normal or non-critical lab and imaging results will be communicated to you by Venmohart, letter or phone within 4 business days after the clinic has received the results. If you do not hear from us within 7 days, please contact the clinic through [facility] or phone. If you have a critical or abnormal lab result, we will notify you by phone as soon as possible.  Submit refill requests through VisualShare or call your pharmacy and they will forward the refill request to us. Please allow 3 business days for your refill to be completed.          Additional Information About Your Visit        MyChart Information     VisualShare gives you secure access to your electronic health record. If you see a primary care provider, you can also send messages to your care team and make appointments. If you have questions, please call your primary care clinic.  If you do not have a primary care provider, please call [phone] and they will assist you.        Care EveryWhere ID     This is your Care  "EveryWhere ID. This could be used by other organizations to access your [place] medical records  [id number]        Your Vitals Were     Pulse Height BMI (Body Mass Index)             59 5' 3\" (1.6 m) 34.54 kg/m2          Blood Pressure from Last 3 Encounters:   11/19/18 118/74   09/27/18 113/58   02/10/18 146/78    Weight from Last 3 Encounters:   11/19/18 195 lb (88.5 kg)   12/14/17 208 lb (94.3 kg)   06/14/17 202 lb 6.4 oz (91.8 kg)              Today, you had the following     No orders found for display       Primary Care Provider Office Phone # Fax #    Carlie Real [phone] [address] [place] [address]        Equal Access to Services     JULIENNE AMBROSE : Hadii myriam mooney hadasho Soomaali, waaxda luqadaha, qaybta kaalmada adeegyada, mary haddad . So [facility] [phone].    ATENCIÓN: Si habla español, tiene a avalos disposición servicios gratuitos de asistencia lingüística. José Manuel al [phone].    We comply with applicable federal civil rights laws and [place] laws. We do not discriminate on the basis of race, color, national origin, age, disability, sex, sexual orientation, or gender identity.            Thank you!     Thank you for choosing [facility]  for your care. Our goal is always to provide you with excellent care. Hearing back from our patients is one way we can continue to improve our services. Please take a few minutes to complete the written survey that you may receive in the mail after your visit with us. Thank you!             Your Updated Medication List - Protect others around you: Learn how to safely use, store and throw away your medicines at www.disposemymeds.org.          This list is accurate as of 11/19/18  3:54 PM.  Always use your most recent med list.                   Brand Name Dispense Instructions for use Diagnosis    aspirin 325 MG EC tablet      one tablet by mouth daily (buffered)     "    atenolol 50 MG tablet    TENORMIN    90 tablet    TAKE 1 TABLET BY MOUTH ONCE DAILY    Essential hypertension, benign       betamethasone dipropionate 0.05 % ointment    DIPROSONE    45 g    Apply sparingly to affected area twice daily as needed.  Do not apply to face.    Psoriasis       * diazepam 5 MG tablet    VALIUM    60 tablet    Take 1 as needed for menieres daily    Meniere's disease, right       * diazepam 5 MG tablet    VALIUM    60 tablet    Take 1 tablet (5 mg) by mouth every 12 hours as needed for anxiety or sleep    Meniere disease, unspecified laterality       * diazepam 5 MG tablet    VALIUM    60 tablet    TAKE 1 TABLET (5 MG) BY MOUTH EVERY 12 HOURS AS NEEDED FRO ANXIETY OR SLEEP    Meniere's disease, bilateral       DIPHENHYDRAMINE HCL PO      Take 25 mg by mouth daily as needed (for Menieres disease)        Fluocinolone Acetonide Scalp 0.01 % Oil oil     120 mL    Apply topically 2 times daily as needed    Psoriasis       fluticasone 50 MCG/ACT spray    FLONASE    3 Package    Spray 1-2 sprays into both nostrils daily    PND (post-nasal drip)       meclizine 25 MG tablet    ANTIVERT    60 tablet    Take 2 tablets by mouth 2 times daily as needed.    Meniere disease       pravastatin 40 MG tablet    PRAVACHOL    90 tablet    TAKE ONE TABLET BY MOUTH EVERY DAY AT BEDTIME    Hyperlipidemia LDL goal <130       prochlorperazine 10 MG tablet    COMPAZINE    10 tablet    Take 0.5 tablets (5 mg) by mouth every 6 hours as needed for nausea or vomiting        traZODone 50 MG tablet    DESYREL    270 tablet    Take 1-3 tablets ( mg) by mouth nightly At Bedtime    Persistent insomnia       triamterene-hydrochlorothiazide 37.5-25 MG per capsule    DYAZIDE    90 capsule    take 1 capsule by mouth once daily in the morning    Hyperlipidemia LDL goal <130       venlafaxine 150 MG 24 hr capsule    EFFEXOR-XR    90 capsule    TAKE 1 CAPSULE BY MOUTH ONCE DAILY    Anxiety       zolpidem 10 MG tablet     AMBIEN    30 tablet    TAKE 1 TABLET BY MOUTH NIGHTLY AT BEDTIME AS NEEDED FOR SLEEP    Persistent insomnia       * Notice:  This list has 3 medication(s) that are the same as other medications prescribed for you. Read the directions carefully, and ask your doctor or other care provider to review them with you.

## 2018-11-28 DIAGNOSIS — F41.9 ANXIETY: ICD-10-CM

## 2018-11-29 RX ORDER — VENLAFAXINE HYDROCHLORIDE 150 MG/1
150 CAPSULE, EXTENDED RELEASE ORAL DAILY
Qty: 30 CAPSULE | Refills: 0 | Status: SHIPPED | OUTPATIENT
Start: 2018-11-29 | End: 2018-12-05

## 2018-11-29 NOTE — TELEPHONE ENCOUNTER
"Venlafaxine HCl ER Oral Capsule Extended Release 24 Hour 150 MG        Last Written Prescription Date:  8/28/18  Last Fill Quantity: 90,   # refills: 0  Last Office Visit: 12/14/17  Future Office visit:       Requested Prescriptions   Pending Prescriptions Disp Refills     venlafaxine (EFFEXOR-XR) 150 MG 24 hr capsule [Pharmacy Med Name: Venlafaxine HCl ER Oral Capsule Extended Release 24 Hour 150 MG] 90 capsule 0     Sig: TAKE 1 CAPSULE BY MOUTH ONCE DAILY    Serotonin-Norepinephrine Reuptake Inhibitors  Passed    11/28/2018  9:12 PM       Passed - Blood pressure under 140/90 in past 12 months    BP Readings from Last 3 Encounters:   11/19/18 118/74   09/27/18 113/58   02/10/18 146/78                Passed - Recent (12 mo) or future (30 days) visit within the authorizing provider's specialty    Patient had office visit in the last 12 months or has a visit in the next 30 days with authorizing provider or within the authorizing provider's specialty.  See \"Patient Info\" tab in inbasket, or \"Choose Columns\" in Meds & Orders section of the refill encounter.             Passed - Patient is age 18 or older       Passed - No active pregnancy on record       Passed - Normal serum creatinine on file in past 12 months    Recent Labs   Lab Test  09/27/18   1316   CR  0.79            Passed - No positive pregnancy test in past 12 months          "

## 2018-12-01 DIAGNOSIS — E78.5 HYPERLIPIDEMIA LDL GOAL <130: ICD-10-CM

## 2018-12-03 RX ORDER — TRIAMTERENE AND HYDROCHLOROTHIAZIDE 37.5; 25 MG/1; MG/1
1 CAPSULE ORAL EVERY MORNING
Qty: 30 CAPSULE | Refills: 0 | Status: SHIPPED | OUTPATIENT
Start: 2018-12-03 | End: 2018-12-05

## 2018-12-03 NOTE — TELEPHONE ENCOUNTER
"Triamterene-HCTZ Oral Capsule 37.5-25 MG        Last Written Prescription Date:  8/31/18  Last Fill Quantity: 90,   # refills: 0  Last Office Visit: 12/14/17  Future Office visit:       Requested Prescriptions   Pending Prescriptions Disp Refills     triamterene-HCTZ (DYAZIDE) 37.5-25 MG capsule [Pharmacy Med Name: Triamterene-HCTZ Oral Capsule 37.5-25 MG] 90 capsule 0     Sig: take 1 capsule by mouth once daily in the morning    Diuretics (Including Combos) Protocol Passed    12/1/2018  7:00 AM       Passed - Blood pressure under 140/90 in past 12 months    BP Readings from Last 3 Encounters:   11/19/18 118/74   09/27/18 113/58   02/10/18 146/78                Passed - Recent (12 mo) or future (30 days) visit within the authorizing provider's specialty    Patient had office visit in the last 12 months or has a visit in the next 30 days with authorizing provider or within the authorizing provider's specialty.  See \"Patient Info\" tab in inbasket, or \"Choose Columns\" in Meds & Orders section of the refill encounter.             Passed - Patient is age 18 or older       Passed - No active pregancy on record       Passed - Normal serum creatinine on file in past 12 months    Recent Labs   Lab Test  09/27/18   1316   CR  0.79             Passed - Normal serum potassium on file in past 12 months    Recent Labs   Lab Test  09/27/18   1316   POTASSIUM  3.7                   Passed - Normal serum sodium on file in past 12 months    Recent Labs   Lab Test  09/27/18   1316   NA  140             Passed - No positive pregnancy test in past 12 months      ]    "

## 2018-12-05 ENCOUNTER — OFFICE VISIT (OUTPATIENT)
Dept: FAMILY MEDICINE | Facility: CLINIC | Age: 58
End: 2018-12-05
Payer: COMMERCIAL

## 2018-12-05 VITALS
HEIGHT: 63 IN | SYSTOLIC BLOOD PRESSURE: 112 MMHG | BODY MASS INDEX: 34.54 KG/M2 | DIASTOLIC BLOOD PRESSURE: 82 MMHG | HEART RATE: 72 BPM | TEMPERATURE: 97.9 F

## 2018-12-05 DIAGNOSIS — I10 ESSENTIAL HYPERTENSION, BENIGN: ICD-10-CM

## 2018-12-05 DIAGNOSIS — L60.0 INGROWN TOENAIL WITH INFECTION: Primary | ICD-10-CM

## 2018-12-05 DIAGNOSIS — M65.332 TRIGGER FINGER, LEFT MIDDLE FINGER: ICD-10-CM

## 2018-12-05 DIAGNOSIS — F41.9 ANXIETY: ICD-10-CM

## 2018-12-05 DIAGNOSIS — E78.5 HYPERLIPIDEMIA LDL GOAL <130: ICD-10-CM

## 2018-12-05 DIAGNOSIS — G47.00 PERSISTENT INSOMNIA: ICD-10-CM

## 2018-12-05 DIAGNOSIS — F06.4 ANXIETY DISORDER DUE TO MEDICAL CONDITION: ICD-10-CM

## 2018-12-05 PROCEDURE — 99214 OFFICE O/P EST MOD 30 MIN: CPT | Performed by: FAMILY MEDICINE

## 2018-12-05 RX ORDER — VENLAFAXINE HYDROCHLORIDE 150 MG/1
150 CAPSULE, EXTENDED RELEASE ORAL DAILY
Qty: 90 CAPSULE | Refills: 0 | Status: SHIPPED | OUTPATIENT
Start: 2018-12-05 | End: 2019-03-27

## 2018-12-05 RX ORDER — CEPHALEXIN 500 MG/1
500 CAPSULE ORAL 2 TIMES DAILY
Qty: 20 CAPSULE | Refills: 0 | Status: SHIPPED | OUTPATIENT
Start: 2018-12-05 | End: 2019-06-15

## 2018-12-05 RX ORDER — VENLAFAXINE HYDROCHLORIDE 150 MG/1
150 CAPSULE, EXTENDED RELEASE ORAL DAILY
Qty: 90 CAPSULE | Refills: 1 | Status: CANCELLED | OUTPATIENT
Start: 2018-12-05

## 2018-12-05 RX ORDER — TRIAMTERENE AND HYDROCHLOROTHIAZIDE 37.5; 25 MG/1; MG/1
1 CAPSULE ORAL EVERY MORNING
Qty: 90 CAPSULE | Refills: 3 | Status: SHIPPED | OUTPATIENT
Start: 2018-12-05 | End: 2019-07-19

## 2018-12-05 RX ORDER — TRIAMTERENE AND HYDROCHLOROTHIAZIDE 37.5; 25 MG/1; MG/1
1 CAPSULE ORAL EVERY MORNING
Qty: 90 CAPSULE | Refills: 3 | Status: CANCELLED | OUTPATIENT
Start: 2018-12-05

## 2018-12-05 RX ORDER — PRAVASTATIN SODIUM 40 MG
TABLET ORAL
Qty: 90 TABLET | Refills: 3 | Status: SHIPPED | OUTPATIENT
Start: 2018-12-05 | End: 2019-07-19

## 2018-12-05 RX ORDER — ATENOLOL 50 MG/1
50 TABLET ORAL DAILY
Qty: 90 TABLET | Refills: 3 | Status: SHIPPED | OUTPATIENT
Start: 2018-12-05 | End: 2019-07-19

## 2018-12-05 RX ORDER — TRAZODONE HYDROCHLORIDE 50 MG/1
TABLET, FILM COATED ORAL
Qty: 270 TABLET | Refills: 1 | Status: SHIPPED | OUTPATIENT
Start: 2018-12-05 | End: 2019-06-17

## 2018-12-05 NOTE — PROGRESS NOTES
SUBJECTIVE:                                                    Kenya Rubalcava is a 58 year old female who presents to clinic today for the following health issues:    Hypertension Follow-up    Outpatient blood pressures are not being checked.    Low Salt Diet: low salt    Anxiety Follow-Up    Status since last visit: No change    Other associated symptoms:None    Complicating factors:   Significant life event: No   Current substance abuse: None  Depression symptoms: No  ELIZA-7 SCORE 1/8/2013 5/4/2015 8/17/2016   Total Score 2 6 -   Total Score - - 14     PHQ-9 SCORE 5/4/2015 8/15/2015 8/17/2016   PHQ-9 Total Score 5 - -   PHQ-9 Total Score MyChart - 11 -   PHQ-9 Total Score - - 3           Problem list and histories reviewed & adjusted, as indicated.  Additional history: she is here for pain in her toes had toenail removal 2 weeks ago and she is having more pain   There is redness and it is tender       Patient Active Problem List   Diagnosis     Anxiety disorder due to medical condition     Essential hypertension, benign     Sensorineural hearing loss     Peripheral vertigo     Allergic rhinitis     Symptomatic menopausal or female climacteric states     Psoriasis     Hyperlipidemia LDL goal <130     Kidney stones     Hypertriglyceridemia     Meniere disease     Anxiety     Kidney stone     Past Surgical History:   Procedure Laterality Date     CARPAL TUNNEL RELEASE RT/LT  08/2008    Left      COLONOSCOPY  8/7/2012    Procedure: COLONOSCOPY;  Colonoscopy;  Surgeon: Kailyn Lopez MD;  Location: [facility]     CYSTOSCOPY, RETROGRADES, INSERT STENT URETER(S), COMBINED Left 8/19/2016    Procedure: COMBINED CYSTOSCOPY, RETROGRADES, INSERT STENT URETER(S);  Surgeon: Terence Carpenter MD;  [facility]     esteem implant  06/23/2011    Hearing implant right ear.     HYSTERECTOMY, PAP NO LONGER INDICATED  12/2004    Vaginal hysterectomy     LAPAROSCOPIC ABLATION ENDOMETRIOSIS  07/2004     LASER HOLMIUM  "LITHOTRIPSY URETER(S), INSERT STENT, COMBINED Left 9/12/2016    Procedure: COMBINED CYSTOSCOPY, URETEROSCOPY, LASER HOLMIUM LITHOTRIPSY URETER(S), INSERT STENT;  Surgeon: Kailyn Dozier MD;  [facility]     LEFT wrist surgery       Sinus surgery with repair of deviated septum       TUBAL LIGATION         Social History   Substance Use Topics     Smoking status: Never Smoker     Smokeless tobacco: Never Used     Alcohol use 0.0 oz/week     0 Standard drinks or equivalent per week      Comment: rare      Family History   Problem Relation Age of Onset     Heart Disease Brother      Hypertension Brother      Asthma Daughter      Heart Disease Brother      Hypertension Brother      C.A.D. Father      MI      Hypertension Father      Lipids Father 50     C.A.D. Paternal Aunt      MI-50s     Cancer Maternal Aunt      Ovarian CA     Cerebrovascular Disease No family hx of      Breast Cancer No family hx of      Cancer - colorectal No family hx of      Prostate Cancer No family hx of            ROS:  Constitutional, HEENT, cardiovascular, pulmonary, gi and gu systems are negative, except as otherwise noted.    OBJECTIVE:                                                    /82  Pulse 72  Temp 97.9  F (36.6  C) (Tympanic)  Ht 5' 3\" (1.6 m)  BMI 34.54 kg/m2 Body mass index is 34.54 kg/(m^2).   GENERAL: healthy, alert, well nourished, well hydrated, no distress  HENT: ear canals- normal; TMs- normal; Nose- normal; Mouth- no ulcers, no lesions  NECK: no tenderness, no adenopathy, no asymmetry, no masses, no stiffness; thyroid- normal to palpation  RESP: lungs clear to auscultation - no rales, no rhonchi, no wheezes  CV: regular rates and rhythm, normal S1 S2, no S3 or S4 and no murmur, no click or rub -  ABDOMEN: soft, no tenderness, no  hepatosplenomegaly, no masses, normal bowel sounds  MS: extremities- erythema surrounding the nail removal sites both great toes   Triggering left middle finger no " erythema        ASSESSMENT/PLAN:                                                      1. Hyperlipidemia LDL goal <130  Cont medications     2. Anxiety      3. Ingrown toenail with infection    - cephALEXin (KEFLEX) 500 MG capsule; Take 1 capsule (500 mg) by mouth 2 times daily  Dispense: 20 capsule; Refill: 0    4. Trigger finger, left middle finger    - ORTHO  REFERRAL    5. Anxiety disorder due to medical condition  Stable     6. Essential hypertension, benign  Controlled      reports that she has never smoked. She has never used smokeless tobacco.      Weight management plan: Discussed healthy diet and exercise guidelines    Carlie Real M.D.    [facility]

## 2018-12-05 NOTE — MR AVS SNAPSHOT
After Visit Summary   12/5/2018    Kenya Rubalcava    MRN: [id number]           Patient Information     Date Of Birth          1960        Visit Information        Provider Department      12/5/2018 8:00 AM Carlie Real MD [facility]        Today's Diagnoses     Ingrown toenail with infection    -  1    Hyperlipidemia LDL goal <130        Anxiety        Trigger finger, left middle finger        Anxiety disorder due to medical condition        Essential hypertension, benign           Follow-ups after your visit        Additional Services     ORTHO  REFERRAL       [facility] is referring you to the Orthopedic  Services at [facility].       The  Representative will assist you in the coordination of your Orthopedic and Musculoskeletal Care as prescribed by your physician.    The  Representative will call you within 1 business day to help schedule your appointment, or you may contact the  Representative at:    All areas ~ [phone]     Type of Referral : Surgical / Specialist     Hand surgery  Timeframe requested: Routine    Coverage of these services is subject to the terms and limitations of your health insurance plan.  Please call member services at your health plan with any benefit or coverage questions.      If X-rays, CT or MRI's have been performed, please contact the facility where they were done to arrange for , prior to your scheduled appointment.  Please bring this referral request to your appointment and present it to your specialist.                  Who to contact     Normal or non-critical lab and imaging results will be communicated to you by MyChart, letter or phone within 4 business days after the clinic has received the results. If you do not hear from us within 7 days, please contact the clinic through MyChart or phone. If you have a critical or abnormal lab result, we  "will notify you by phone as soon as possible.  Submit refill requests through [employer] or call your pharmacy and they will forward the refill request to us. Please allow 3 business days for your refill to be completed.          If you need to speak with a  for additional information , please call: [phone]             Additional Information About Your Visit        [employer] Information     [employer] gives you secure access to your electronic health record. If you see a primary care provider, you can also send messages to your care team and make appointments. If you have questions, please call your primary care clinic.  If you do not have a primary care provider, please call [phone] and they will assist you.        Care EveryWhere ID     This is your Care EveryWhere ID. This could be used by other organizations to access your [facility] medical records  [id number]        Your Vitals Were     Pulse Temperature Height BMI (Body Mass Index)          72 97.9  F (36.6  C) (Tympanic) 5' 3\" (1.6 m) 34.54 kg/m2         Blood Pressure from Last 3 Encounters:   12/05/18 112/82   11/19/18 118/74   09/27/18 113/58    Weight from Last 3 Encounters:   11/19/18 195 lb (88.5 kg)   12/14/17 208 lb (94.3 kg)   06/14/17 202 lb 6.4 oz (91.8 kg)              We Performed the Following     ORTHO  REFERRAL          Today's Medication Changes          These changes are accurate as of 12/5/18  8:46 AM.  If you have any questions, ask your nurse or doctor.               Start taking these medicines.        Dose/Directions    cephALEXin 500 MG capsule   Commonly known as:  KEFLEX   Used for:  Ingrown toenail with infection   Started by:  Carlie Real MD        Dose:  500 mg   Take 1 capsule (500 mg) by mouth 2 times daily   Quantity:  20 capsule   Refills:  0         These medicines have changed or have updated prescriptions.        Dose/Directions    atenolol 50 MG tablet   Commonly known as:  TENORMIN "   This may have changed:  See the new instructions.   Used for:  Essential hypertension, benign   Changed by:  Carlie Real MD        Dose:  50 mg   Take 1 tablet (50 mg) by mouth daily   Quantity:  90 tablet   Refills:  3       venlafaxine 150 MG 24 hr capsule   Commonly known as:  EFFEXOR-XR   This may have changed:  additional instructions   Used for:  Anxiety   Changed by:  Carlie Real MD        Dose:  150 mg   Take 1 capsule (150 mg) by mouth daily   Quantity:  90 capsule   Refills:  0         Stop taking these medicines if you haven't already. Please contact your care team if you have questions.     zolpidem 10 MG tablet   Commonly known as:  AMBIEN   Stopped by:  Carlie Real MD                Where to get your medicines      These medications were sent to [id number] - [place], [place] - [address]   [address] , [facility] [address]    Hours:  test fax successfully sent 10/22/03 kr Phone:  [phone]     atenolol 50 MG tablet    cephALEXin 500 MG capsule    pravastatin 40 MG tablet    traZODone 50 MG tablet    triamterene-HCTZ 37.5-25 MG capsule    venlafaxine 150 MG 24 hr capsule                Primary Care Provider Office Phone # Fax #    Carlie Real [phone] [address] [facility] [address]        Equal Access to Services     [facility] AH: Hadii aad ku hadasho Soomaali, waaxda luqadaha, qaybta kaalmada adeegyada, mary haddad . So [facility] [phone].    ATENCIÓN: Si habla español, tiene a avalos disposición servicios gratuitos de asistencia lingüística. Llame al [phone].    We comply with applicable federal civil rights laws and [place] laws. We do not discriminate on the basis of race, color, national origin, age, disability, sex, sexual orientation, or gender identity.            Thank you!     Thank you for choosing [facility]  for your care. Our goal is always to provide you  with excellent care. Hearing back from our patients is one way we can continue to improve our services. Please take a few minutes to complete the written survey that you may receive in the mail after your visit with us. Thank you!             Your Updated Medication List - Protect others around you: Learn how to safely use, store and throw away your medicines at www.disposemymeds.org.          This list is accurate as of 12/5/18  8:46 AM.  Always use your most recent med list.                   Brand Name Dispense Instructions for use Diagnosis    aspirin 325 MG EC tablet    ASA     one tablet by mouth daily (buffered)        atenolol 50 MG tablet    TENORMIN    90 tablet    Take 1 tablet (50 mg) by mouth daily    Essential hypertension, benign       betamethasone dipropionate 0.05 % external ointment    DIPROSONE    45 g    Apply sparingly to affected area twice daily as needed.  Do not apply to face.    Psoriasis       cephALEXin 500 MG capsule    KEFLEX    20 capsule    Take 1 capsule (500 mg) by mouth 2 times daily    Ingrown toenail with infection       diazepam 5 MG tablet    VALIUM    60 tablet    TAKE 1 TABLET (5 MG) BY MOUTH EVERY 12 HOURS AS NEEDED FRO ANXIETY OR SLEEP    Meniere's disease, bilateral       DIPHENHYDRAMINE HCL PO      Take 25 mg by mouth daily as needed (for Menieres disease)        Fluocinolone Acetonide Scalp 0.01 % Oil oil     120 mL    Apply topically 2 times daily as needed    Psoriasis       fluticasone 50 MCG/ACT nasal spray    FLONASE    3 Package    Spray 1-2 sprays into both nostrils daily    PND (post-nasal drip)       meclizine 25 MG tablet    ANTIVERT    60 tablet    Take 2 tablets by mouth 2 times daily as needed.    Meniere disease       pravastatin 40 MG tablet    PRAVACHOL    90 tablet    TAKE ONE TABLET BY MOUTH EVERY DAY AT BEDTIME    Hyperlipidemia LDL goal <130       prochlorperazine 10 MG tablet    COMPAZINE    10 tablet    Take 0.5 tablets (5 mg) by mouth every 6  hours as needed for nausea or vomiting        traZODone 50 MG tablet    DESYREL    270 tablet    Take 1-3 tablets ( mg) by mouth nightly At Bedtime    Persistent insomnia       triamterene-HCTZ 37.5-25 MG capsule    DYAZIDE    90 capsule    Take 1 capsule by mouth every morning (Needs follow-up appointment for this medication)    Hyperlipidemia LDL goal <130       venlafaxine 150 MG 24 hr capsule    EFFEXOR-XR    90 capsule    Take 1 capsule (150 mg) by mouth daily    Anxiety

## 2018-12-27 DIAGNOSIS — E78.5 HYPERLIPIDEMIA LDL GOAL <130: ICD-10-CM

## 2018-12-27 LAB
CHOLEST SERPL-MCNC: 153 MG/DL
HDLC SERPL-MCNC: 27 MG/DL
LDLC SERPL CALC-MCNC: 53 MG/DL
NONHDLC SERPL-MCNC: 126 MG/DL
TRIGL SERPL-MCNC: 363 MG/DL

## 2018-12-27 PROCEDURE — 36415 COLL VENOUS BLD VENIPUNCTURE: CPT | Performed by: FAMILY MEDICINE

## 2018-12-27 PROCEDURE — 80061 LIPID PANEL: CPT | Performed by: FAMILY MEDICINE

## 2019-02-11 ENCOUNTER — HOSPITAL ENCOUNTER (EMERGENCY)
Facility: CLINIC | Age: 59
Discharge: HOME | End: 2019-02-11
Attending: PHYSICIAN ASSISTANT | Admitting: PHYSICIAN ASSISTANT
Payer: COMMERCIAL

## 2019-02-11 VITALS
DIASTOLIC BLOOD PRESSURE: 44 MMHG | TEMPERATURE: 97.9 F | BODY MASS INDEX: 31.58 KG/M2 | OXYGEN SATURATION: 98 % | SYSTOLIC BLOOD PRESSURE: 105 MMHG | HEIGHT: 64 IN | RESPIRATION RATE: 16 BRPM | WEIGHT: 185 LBS

## 2019-02-11 DIAGNOSIS — L60.0 INGROWN TOENAIL OF LEFT FOOT WITH INFECTION: ICD-10-CM

## 2019-02-11 PROCEDURE — 99214 OFFICE O/P EST MOD 30 MIN: CPT | Mod: 25 | Performed by: PHYSICIAN ASSISTANT

## 2019-02-11 PROCEDURE — 11750 EXCISION NAIL&NAIL MATRIX: CPT | Mod: TA | Performed by: PHYSICIAN ASSISTANT

## 2019-02-11 PROCEDURE — G0463 HOSPITAL OUTPT CLINIC VISIT: HCPCS | Mod: 25 | Performed by: PHYSICIAN ASSISTANT

## 2019-02-11 RX ORDER — HYDROCODONE BITARTRATE AND ACETAMINOPHEN 5; 325 MG/1; MG/1
1 TABLET ORAL EVERY 6 HOURS PRN
Qty: 10 TABLET | Refills: 0 | Status: SHIPPED | OUTPATIENT
Start: 2019-02-11 | End: 2019-06-15

## 2019-02-11 RX ORDER — CEPHALEXIN 500 MG/1
500 CAPSULE ORAL 3 TIMES DAILY
Qty: 30 CAPSULE | Refills: 0 | Status: SHIPPED | OUTPATIENT
Start: 2019-02-11 | End: 2019-02-21

## 2019-02-11 ASSESSMENT — ENCOUNTER SYMPTOMS: WOUND: 0

## 2019-02-11 ASSESSMENT — MIFFLIN-ST. JEOR: SCORE: 1409.15

## 2019-02-11 NOTE — ED AVS SNAPSHOT
[facility] Emergency Department  [address] [address]  Phone:  [phone]  Fax:  [phone]                                    Kenya Rubalcava   MRN: [id number]    Department:  [facility] Emergency Department   Date of Visit:  2/11/2019           After Visit Summary Signature Page    I have received my discharge instructions, and my questions have been answered. I have discussed any challenges I see with this plan with the nurse or doctor.    ..........................................................................................................................................  Patient/Patient Representative Signature      ..........................................................................................................................................  Patient Representative Print Name and Relationship to Patient    ..................................................               ................................................  Date                                   Time    ..........................................................................................................................................  Reviewed by Signature/Title    ...................................................              ..............................................  Date                                               Time          22EPIC Rev 08/18

## 2019-02-12 NOTE — DISCHARGE INSTRUCTIONS
Soak toe in Epsom salt warm soaks 2-3 times daily.  His medication as directed.  Patient follow-up with podiatry for further evaluation and treatment of recurrent ingrown toenails.    Patient return to the ED if increased redness, swelling, purulent drainage, red streak up the toe, decreased range of motion, fevers occur.    Patient elevate the toe over the next 2-3 hours while at home.    The dressing changes with a trace and ointment for the next several days.

## 2019-02-12 NOTE — ED PROVIDER NOTES
History     Chief Complaint   Patient presents with     Toe Pain     L great toe infection/surgery 2 months ago     HPI  Kenya Rubalcava is a 58 year old female who presents the urgent care with left great toe infection.  Patient states she has history of ingrown toenail issues and had them removed several times.  Patient states that over the past 2 months she has been dealing with recurrent infections to the toes.  Patient denies any trauma, fevers, numbness, decreased range of motion, red streaking up the foot.  States she has been soaking the foot in Epsom salts and warm water for the past couple of days.  Patient noticed the symptoms started 2-3 days ago and today noticed purulent drainage from the medial aspect of the left great toe.    Last Tdap within the past 10 years.     Allergies:  Allergies   Allergen Reactions     Ivp Dye [Contrast Dye] Anaphylaxis and Hives     Cats Anaphylaxis     Niaspan [Niacin] Rash     Pollen Extract/Tree Extract        Problem List:    Patient Active Problem List    Diagnosis Date Noted     Kidney stone 07/19/2016     Priority: Medium     Anxiety 05/14/2015     Priority: Medium     Meniere disease 11/11/2010     Priority: Medium     Diagnosed 10/10 - active in left ear. Trying dyazide daily, serax twice daily, meclizine qid        Kidney stones 06/15/2009     Priority: Medium     Bilateral noted on CT 5/23/09       Hypertriglyceridemia 06/15/2009     Priority: Medium     Hyperlipidemia LDL goal <130 06/30/2008     Priority: Medium     Recent Labs   Lab Test 11/26/10 0847 7/1/10 0927     CHOL 188 164     HDL 28* 27*     LDL Cannot estimate LDL when triglyceride exceeds 400 mg/dL93 Cannot estimate LDL when triglyceride exceeds 400 mg/dL     TRIG 443* 431*     CHOLHDLRATIO 7.0* 6.0*     11/29/10 - Just added fenofibrate and recheck in 2 months lipid/ast        Psoriasis 05/22/2008     Priority: Medium     Less than 1% body surface area - 1% ointment Triamcinalone, scalp  Head&Shoulders or Nizoral Bruceton-Smoothe FS prescriptions.       Symptomatic menopausal or female climacteric states 06/25/2007     Priority: Medium     Peripheral vertigo 09/06/2006     Priority: Medium     Problem list name updated by automated process. Provider to review       Essential hypertension, benign 05/02/2006     Priority: Medium     Anxiety disorder due to medical condition      Priority: Medium     Treatment tried:  Buspar 7.5 mg bid (stopped 03/21/2005), Celexa started 03/21/2005    Problem list name updated by automated process. Provider to review       Allergic rhinitis 06/16/2004     Priority: Medium     Environmental allergies  Problem list name updated by automated process. Provider to review       Sensorineural hearing loss 07/05/2002     Priority: Medium     Referred to [facility] by previous clinic  Problem list name updated by automated process. Provider to review          Past Medical History:    Past Medical History:   Diagnosis Date     Abnormal Papanicolaou smear of cervix and cervical HPV 1986     Anxiety      Anxiety disorder in conditions classified elsewhere      Calculus of kidney 08/11/1988     Calculus of ureter 06/30/1985     Excessive or frequent menstruation 07/07/2003     Labyrinthitis, unspecified      Lump or mass in breast 10/27/1999     Variants of migraine, not elsewhere classified, without mention of intractable migraine without mention of status migrainosus 02/07/2002       Past Surgical History:    Past Surgical History:   Procedure Laterality Date     CARPAL TUNNEL RELEASE RT/LT  08/2008    Left      COLONOSCOPY  8/7/2012    Procedure: COLONOSCOPY;  Colonoscopy;  Surgeon: Kailyn Lopez MD;  Location: [facility]     CYSTOSCOPY, RETROGRADES, INSERT STENT URETER(S), COMBINED Left 8/19/2016    Procedure: COMBINED CYSTOSCOPY, RETROGRADES, INSERT STENT URETER(S);  Surgeon: Terence Carpenter MD;  Location: [facility]     esteem implant  06/23/2011     Hearing implant right ear.     HYSTERECTOMY, PAP NO LONGER INDICATED  12/2004    Vaginal hysterectomy     LAPAROSCOPIC ABLATION ENDOMETRIOSIS  07/2004     LASER HOLMIUM LITHOTRIPSY URETER(S), INSERT STENT, COMBINED Left 9/12/2016    Procedure: COMBINED CYSTOSCOPY, URETEROSCOPY, LASER HOLMIUM LITHOTRIPSY URETER(S), INSERT STENT;  Surgeon: Kailyn Dozier MD;  Location: [facility] OR     LEFT wrist surgery       Sinus surgery with repair of deviated septum       TUBAL LIGATION         Family History:    Family History   Problem Relation Age of Onset     Heart Disease Brother      Hypertension Brother      Asthma Daughter      Heart Disease Brother      Hypertension Brother      C.A.D. Father         MI      Hypertension Father      Lipids Father 50     C.A.D. Paternal Aunt         MI-50s     Cancer Maternal Aunt         Ovarian CA     Cerebrovascular Disease No family hx of      Breast Cancer No family hx of      Cancer - colorectal No family hx of      Prostate Cancer No family hx of        Social History:  Marital Status:  Single [1]  Social History     Tobacco Use     Smoking status: Never Smoker     Smokeless tobacco: Never Used   Substance Use Topics     Alcohol use: Yes     Alcohol/week: 0.0 oz     Comment: rare      Drug use: No        Medications:      cephALEXin (KEFLEX) 500 MG capsule   HYDROcodone-acetaminophen (NORCO) 5-325 MG tablet   ASPIRIN 325 MG OR TBEC   atenolol (TENORMIN) 50 MG tablet   betamethasone dipropionate (DIPROSONE) 0.05 % ointment   cephALEXin (KEFLEX) 500 MG capsule   diazepam (VALIUM) 5 MG tablet   DIPHENHYDRAMINE HCL PO   Fluocinolone Acetonide Scalp 0.01 % OIL oil   fluticasone (FLONASE) 50 MCG/ACT nasal spray   meclizine (ANTIVERT) 25 MG tablet   pravastatin (PRAVACHOL) 40 MG tablet   prochlorperazine (COMPAZINE) 10 MG tablet   traZODone (DESYREL) 50 MG tablet   triamterene-HCTZ (DYAZIDE) 37.5-25 MG capsule   venlafaxine (EFFEXOR-XR) 150 MG 24 hr capsule         Review of  "Systems   Musculoskeletal:        Left great toe pain and redness.    Skin: Negative for wound.        Positive erythema, swelling, tenderness and warmth to the paronychia of the left great toe.    All other systems reviewed and are negative.      Physical Exam   BP: 105/44  Heart Rate: 67  Temp: 97.9  F (36.6  C)  Resp: 16  Height: 162.6 cm (5' 4\")  Weight: 83.9 kg (185 lb)  SpO2: 98 %      Physical Exam   Constitutional: She is oriented to person, place, and time. She appears well-developed and well-nourished. No distress.   Musculoskeletal:        Left foot: There is decreased range of motion, tenderness and swelling. There is no bony tenderness, normal capillary refill, no crepitus, no deformity and no laceration.        Feet:    Neurological: She is alert and oriented to person, place, and time. She has normal strength. No sensory deficit. Gait normal. GCS eye subscore is 4. GCS verbal subscore is 5. GCS motor subscore is 6.   Skin: Skin is warm and dry. Capillary refill takes less than 2 seconds. She is not diaphoretic. There is erythema (to the left great toe. ).   Psychiatric: She has a normal mood and affect. Her behavior is normal. Judgment and thought content normal.       ED Course        Procedures  Patient informed of all risks and benefits to partial toenail removal due to ingrown toenail in office today.  Patient aware of the procedure and has had these done in the past.  Patient states that she would like to have the toenail partially removed due to pain and swelling and infection.  Patient aware of other treatments including oral antibiotic treatment and follow-up with podiatry.  Left great toe was cleaned with Betadine swabs, alcohol swabs, and Hibiclens solution with saline. Digital block using 1% lidocaine without epi done in office.  3.5 mL's of 1% lidocaine without epi used for the digital block and 1 mL used locally to the distal aspect of the toe.  After digital block obtained turnicot " placed on the left great toe and using a blunted tissue forceps with no teeth attached them the medial one third of the nail was lifted up off of the nailbed.  Toenail scissors used to clip the toenail toenail.  Medial one third of the toenail was removed with a hemostat with no complications.  Area was irrigated and cleaned significantly with Hibiclens and saline solution and Betadine.  Surgical scrub also used to clean the area.  Bacitracin and nonadhesive dressing with sterile gauze used to dress the toe after turnicot was removed.  Patient had very minimal bleeding and tolerated procedure well with no complications.            Critical Care time:  none               No results found for this or any previous visit (from the past 24 hour(s)).    Medications   silver nitrate (ARZOL) Misc (not administered)       Assessments & Plan (with Medical Decision Making)     I have reviewed the nursing notes.    I have reviewed the findings, diagnosis, plan and need for follow up with the patient.    58-year-old female presents the urgent care with left infected ingrown toenail.  Medial one third aspect of the toenail was removed in office today see procedure note above.  Patient tolerated procedure well with no complications.  Patient placed on Keflex 1 tablet 3 times daily for 10 days for treatment of infected toenail.  Patient given orthopedic information for follow-up in the next 2-3 days for recheck.  Patient return to ED if symptoms worsen or change these were discussed with patient on discharge paperwork.       Medication List      Started    HYDROcodone-acetaminophen 5-325 MG tablet  Commonly known as:  NORCO  1 tablet, Oral, EVERY 6 HOURS PRN        Modified    * cephALEXin 500 MG capsule  Commonly known as:  KEFLEX  500 mg, Oral, 2 TIMES DAILY  What changed:  Another medication with the same name was added. Make sure you understand how and when to take each.     * cephALEXin 500 MG capsule  Commonly known as:   KEFLEX  500 mg, Oral, 3 TIMES DAILY  What changed:  You were already taking a medication with the same name, and this prescription was added. Make sure you understand how and when to take each.         * This list has 2 medication(s) that are the same as other medications prescribed for you. Read the directions carefully, and ask your doctor or other care provider to review them with you.                Final diagnoses:   Ingrown toenail of left foot with infection - 1st digit       2/11/2019   [facility] EMERGENCY DEPARTMENT     Rashmi Dickey PA-C  02/11/19 [id number]

## 2019-02-28 DIAGNOSIS — H81.03 MENIERE'S DISEASE, BILATERAL: ICD-10-CM

## 2019-02-28 RX ORDER — DIAZEPAM 5 MG
TABLET ORAL
Qty: 60 TABLET | Refills: 0 | Status: SHIPPED | OUTPATIENT
Start: 2019-02-28 | End: 2019-07-13

## 2019-02-28 NOTE — TELEPHONE ENCOUNTER
Routing refill request to provider for review/approval because:  Drug not on the [facility] refill protocol     Requested Prescriptions   Pending Prescriptions Disp Refills     diazepam (VALIUM) 5 MG tablet [Pharmacy Med Name: diazePAM Oral Tablet 5 MG] 60 tablet 1     Sig: TAKE 1 TABLET BY MOUTH EVERY 12 HOURS AS NEEDED FOR ANXIETY OR SLEEP.    There is no refill protocol information for this order        Last Written Prescription Date:  8/1/18  Last Fill Quantity: 60,  # refills: 2   Last office visit: 12/5/2018 with prescribing provider:  Addie Nathan Office Visit:      Nimo ANN RN

## 2019-03-18 ENCOUNTER — OFFICE VISIT (OUTPATIENT)
Dept: FAMILY MEDICINE | Facility: CLINIC | Age: 59
End: 2019-03-18
Payer: COMMERCIAL

## 2019-03-18 ENCOUNTER — ANCILLARY PROCEDURE (OUTPATIENT)
Dept: GENERAL RADIOLOGY | Facility: CLINIC | Age: 59
End: 2019-03-18
Attending: PHYSICIAN ASSISTANT
Payer: COMMERCIAL

## 2019-03-18 VITALS
DIASTOLIC BLOOD PRESSURE: 86 MMHG | SYSTOLIC BLOOD PRESSURE: 130 MMHG | HEIGHT: 64 IN | WEIGHT: 212.8 LBS | TEMPERATURE: 98.8 F | HEART RATE: 74 BPM | OXYGEN SATURATION: 96 % | BODY MASS INDEX: 36.33 KG/M2

## 2019-03-18 DIAGNOSIS — M79.671 PAIN OF RIGHT HEEL: Primary | ICD-10-CM

## 2019-03-18 DIAGNOSIS — M79.671 PAIN OF RIGHT HEEL: ICD-10-CM

## 2019-03-18 PROCEDURE — 99213 OFFICE O/P EST LOW 20 MIN: CPT | Performed by: PHYSICIAN ASSISTANT

## 2019-03-18 PROCEDURE — 73630 X-RAY EXAM OF FOOT: CPT | Mod: RT

## 2019-03-18 RX ORDER — NAPROXEN 500 MG/1
500 TABLET ORAL 2 TIMES DAILY WITH MEALS
Qty: 60 TABLET | Refills: 0 | Status: SHIPPED | OUTPATIENT
Start: 2019-03-18 | End: 2019-07-19

## 2019-03-18 ASSESSMENT — ENCOUNTER SYMPTOMS
COUGH: 0
PALPITATIONS: 0
EYE REDNESS: 0
EYE DISCHARGE: 0
BLURRED VISION: 0
SORE THROAT: 0
NAUSEA: 0
SHORTNESS OF BREATH: 0
CHILLS: 0
WHEEZING: 0
DIARRHEA: 0
FEVER: 0
MYALGIAS: 0
HEADACHES: 0
ABDOMINAL PAIN: 0
VOMITING: 0

## 2019-03-18 ASSESSMENT — MIFFLIN-ST. JEOR: SCORE: 1527.31

## 2019-03-18 NOTE — PROGRESS NOTES
SUBJECTIVE:   Kenya Rubalcava is a 58 year old female who presents to clinic today for the following health issues:      Musculoskeletal problem/pain      Duration: 4 days     Description  Location: right foot- heel up to the ankle     Intensity:  severe    Accompanying signs and symptoms: swelling and pain     History  Previous similar problem: YES- bone spur and possible fracture in 2016 on the right foot.   Previous evaluation:  x-ray    Precipitating or alleviating factors:  Trauma or overuse: YES- was in [place]- stepped wrong on the [place]. Just got [place] last night. She continued to walk on the foot for 4 days after this occurred.   Aggravating factors include: standing and walking    Therapies tried and outcome: Ibuprofen and elevation     Patient is requesting x-ray today     Problem list and histories reviewed & adjusted, as indicated.  Additional history: as documented    Patient Active Problem List   Diagnosis     Anxiety disorder due to medical condition     Essential hypertension, benign     Sensorineural hearing loss     Peripheral vertigo     Allergic rhinitis     Symptomatic menopausal or female climacteric states     Psoriasis     Hyperlipidemia LDL goal <130     Kidney stones     Hypertriglyceridemia     Meniere disease     Anxiety     Kidney stone     Past Surgical History:   Procedure Laterality Date     CARPAL TUNNEL RELEASE RT/LT  08/2008    Left      COLONOSCOPY  8/7/2012    Procedure: COLONOSCOPY;  Colonoscopy;  Surgeon: Kailyn Lopez MD;  Location: [facility]     CYSTOSCOPY, RETROGRADES, INSERT STENT URETER(S), COMBINED Left 8/19/2016    Procedure: COMBINED CYSTOSCOPY, RETROGRADES, INSERT STENT URETER(S);  Surgeon: Terence Carpenter MD;  Location:  OR     esteem implant  06/23/2011    Hearing implant right ear.     HYSTERECTOMY, PAP NO LONGER INDICATED  12/2004    Vaginal hysterectomy     LAPAROSCOPIC ABLATION ENDOMETRIOSIS   07/2004     LASER HOLMIUM LITHOTRIPSY URETER(S), INSERT STENT, COMBINED Left 9/12/2016    Procedure: COMBINED CYSTOSCOPY, URETEROSCOPY, LASER HOLMIUM LITHOTRIPSY URETER(S), INSERT STENT;  Surgeon: Kailyn Dozier MD;  [facility]     LEFT wrist surgery       Sinus surgery with repair of deviated septum       TUBAL LIGATION         Social History     Tobacco Use     Smoking status: Never Smoker     Smokeless tobacco: Never Used   Substance Use Topics     Alcohol use: Yes     Alcohol/week: 0.0 oz     Comment: rare      Family History   Problem Relation Age of Onset     Heart Disease Brother      Hypertension Brother      Asthma Daughter      Heart Disease Brother      Hypertension Brother      C.A.D. Father         MI      Hypertension Father      Lipids Father 50     C.A.D. Paternal Aunt         MI-50s     Cancer Maternal Aunt         Ovarian CA     Cerebrovascular Disease No family hx of      Breast Cancer No family hx of      Cancer - colorectal No family hx of      Prostate Cancer No family hx of          Current Outpatient Medications   Medication Sig Dispense Refill     ASPIRIN 325 MG OR TBEC one tablet by mouth daily (buffered)       atenolol (TENORMIN) 50 MG tablet Take 1 tablet (50 mg) by mouth daily 90 tablet 3     betamethasone dipropionate (DIPROSONE) 0.05 % ointment Apply sparingly to affected area twice daily as needed.  Do not apply to face. 45 g 0     diazepam (VALIUM) 5 MG tablet TAKE 1 TABLET BY MOUTH EVERY 12 HOURS AS NEEDED FOR ANXIETY OR SLEEP. 60 tablet 0     DIPHENHYDRAMINE HCL PO Take 25 mg by mouth daily as needed (for Menieres disease)       Fluocinolone Acetonide Scalp 0.01 % OIL oil Apply topically 2 times daily as needed 120 mL 3     fluticasone (FLONASE) 50 MCG/ACT nasal spray Spray 1-2 sprays into both nostrils daily 3 Package 1     HYDROcodone-acetaminophen (NORCO) 5-325 MG tablet Take 1 tablet by mouth every 6 hours as needed for severe pain 10 tablet 0     meclizine  (ANTIVERT) 25 MG tablet Take 2 tablets by mouth 2 times daily as needed. 60 tablet 0     naproxen (NAPROSYN) 500 MG tablet Take 1 tablet (500 mg) by mouth 2 times daily (with meals) 60 tablet 0     order for DME Equipment being ordered: heel cups 1 Device 0     pravastatin (PRAVACHOL) 40 MG tablet TAKE ONE TABLET BY MOUTH EVERY DAY AT BEDTIME 90 tablet 3     prochlorperazine (COMPAZINE) 10 MG tablet Take 0.5 tablets (5 mg) by mouth every 6 hours as needed for nausea or vomiting 10 tablet 1     traZODone (DESYREL) 50 MG tablet Take 1-3 tablets ( mg) by mouth nightly At Bedtime 270 tablet 1     triamterene-HCTZ (DYAZIDE) 37.5-25 MG capsule Take 1 capsule by mouth every morning (Needs follow-up appointment for this medication) 90 capsule 3     venlafaxine (EFFEXOR-XR) 150 MG 24 hr capsule Take 1 capsule (150 mg) by mouth daily 90 capsule 0     cephALEXin (KEFLEX) 500 MG capsule Take 1 capsule (500 mg) by mouth 2 times daily (Patient not taking: Reported on 3/18/2019) 20 capsule 0     Allergies   Allergen Reactions     Ivp Dye [Contrast Dye] Anaphylaxis and Hives     Cats Anaphylaxis     Niaspan [Niacin] Rash     Pollen Extract/Tree Extract      Labs reviewed in EPIC    Reviewed and updated as needed this visit by clinical staff  Tobacco  Allergies  Meds  Problems  Med Hx  Surg Hx  Fam Hx  Soc Hx        Reviewed and updated as needed this visit by Provider  Tobacco  Allergies  Meds  Problems  Med Hx  Surg Hx  Fam Hx         ROS:  Review of Systems   Constitutional: Negative for chills, fever and malaise/fatigue.   HENT: Negative for congestion, ear pain and sore throat.    Eyes: Negative for blurred vision, discharge and redness.   Respiratory: Negative for cough, shortness of breath and wheezing.    Cardiovascular: Negative for chest pain and palpitations.   Gastrointestinal: Negative for abdominal pain, diarrhea, nausea and vomiting.   Musculoskeletal: Negative for joint pain and myalgias.         "Right foot pain   Skin: Negative for rash.   Neurological: Negative for headaches.         OBJECTIVE:     /86   Pulse 74   Temp 98.8  F (37.1  C) (Tympanic)   Ht 1.613 m (5' 3.5\")   Wt 96.5 kg (212 lb 12.8 oz)   SpO2 96%   BMI 37.10 kg/m    Body mass index is 37.1 kg/m .    Physical Exam   Constitutional: She appears well-developed and well-nourished. No distress.   Musculoskeletal:   No obvious deformity, erythema, edema, or ecchymosis of the right foot and ankle. There is moderate tenderness with palpation of right heel. Lower extremity CMS intact.      Skin: Skin is warm and intact.   Psychiatric: She has a normal mood and affect. Her speech is normal and behavior is normal.       Diagnostic Test Results:  none     ASSESSMENT/PLAN:     1. Pain of right heel  Reassurance, no acute findings on X-ray. Can take naproxen 500mg two times daily with food x 5-10 days for pain and inflammation. Fitted with heel cups. Can try alternating ice/heat in 20 minute intervals, avoid aggravating factors, but encouraged gentle ROM and stretching.  Would recommend physical therapy or follow up with primary care provider if symptoms worsen or do not improve.      - XR Foot Right G/E 3 Views; Future  - order for DME; Equipment being ordered: heel cups  Dispense: 1 Device; Refill: 0  - naproxen (NAPROSYN) 500 MG tablet; Take 1 tablet (500 mg) by mouth 2 times daily (with meals)  Dispense: 60 tablet; Refill: 0       Xiomy Higginbotham PA-C  [facility]    "

## 2019-03-27 ENCOUNTER — TELEPHONE (OUTPATIENT)
Dept: FAMILY MEDICINE | Facility: CLINIC | Age: 59
End: 2019-03-27

## 2019-03-27 DIAGNOSIS — M79.671 PAIN OF RIGHT HEEL: Primary | ICD-10-CM

## 2019-03-27 DIAGNOSIS — F41.9 ANXIETY: ICD-10-CM

## 2019-03-27 RX ORDER — VENLAFAXINE HYDROCHLORIDE 150 MG/1
150 CAPSULE, EXTENDED RELEASE ORAL DAILY
Qty: 90 CAPSULE | Refills: 0 | Status: SHIPPED | OUTPATIENT
Start: 2019-03-27 | End: 2019-06-20

## 2019-03-27 NOTE — TELEPHONE ENCOUNTER
"Venlafaxine HCl ER Oral Capsule Extended Release 24 Hour 150 MG      Last Written Prescription Date:  12/5/18  Last Fill Quantity: 90,   # refills: 0  Last Office Visit: 3/18/19  Future Office visit:       Requested Prescriptions   Pending Prescriptions Disp Refills     venlafaxine (EFFEXOR-XR) 150 MG 24 hr capsule [Pharmacy Med Name: Venlafaxine HCl ER Oral Capsule Extended Release 24 Hour 150 MG] 90 capsule 0     Sig: Take 1 capsule (150 mg) by mouth daily    Serotonin-Norepinephrine Reuptake Inhibitors  Passed - 3/27/2019  7:00 AM       Passed - Blood pressure under 140/90 in past 12 months    BP Readings from Last 3 Encounters:   03/18/19 130/86   02/11/19 105/44   12/05/18 112/82                Passed - Recent (12 mo) or future (30 days) visit within the authorizing provider's specialty    Patient had office visit in the last 12 months or has a visit in the next 30 days with authorizing provider or within the authorizing provider's specialty.  See \"Patient Info\" tab in inbasket, or \"Choose Columns\" in Meds & Orders section of the refill encounter.             Passed - Medication is active on med list       Passed - Patient is age 18 or older       Passed - No active pregnancy on record       Passed - Normal serum creatinine on file in past 12 months    Recent Labs   Lab Test 09/27/18  1316   CR 0.79            Passed - No positive pregnancy test in past 12 months          "

## 2019-03-27 NOTE — TELEPHONE ENCOUNTER
Medication is being filled for 1 time refill only due to:  Patient needs to be seen because overdue for 3 month follow up and phq9. Mark Cosme RN

## 2019-03-28 RX ORDER — VENLAFAXINE HYDROCHLORIDE 150 MG/1
150 CAPSULE, EXTENDED RELEASE ORAL DAILY
Qty: 90 CAPSULE | Refills: 0 | OUTPATIENT
Start: 2019-03-28

## 2019-04-03 ENCOUNTER — OFFICE VISIT (OUTPATIENT)
Dept: ORTHOPEDICS | Facility: CLINIC | Age: 59
End: 2019-04-03
Payer: COMMERCIAL

## 2019-04-03 VITALS — HEIGHT: 64 IN | BODY MASS INDEX: 36.37 KG/M2 | WEIGHT: 213 LBS

## 2019-04-03 DIAGNOSIS — M76.61 ACHILLES TENDINITIS OF RIGHT LOWER EXTREMITY: Primary | ICD-10-CM

## 2019-04-03 DIAGNOSIS — M76.62 LEFT ACHILLES BURSITIS: ICD-10-CM

## 2019-04-03 DIAGNOSIS — M76.72 PERONEAL TENDINITIS OF LEFT LOWER EXTREMITY: ICD-10-CM

## 2019-04-03 PROCEDURE — 99203 OFFICE O/P NEW LOW 30 MIN: CPT | Performed by: PEDIATRICS

## 2019-04-03 ASSESSMENT — MIFFLIN-ST. JEOR: SCORE: 1528.22

## 2019-04-03 NOTE — PROGRESS NOTES
Sports Medicine Clinic Visit    PCP: Carlie Real    Kenya Rubalcava is a 58 year old female who is seen as a self referral presenting with right heel pain    Injury: She reports right foot pain for 2.5 weeks. She reports her pain increased while walking a lot on vacations. She reports her pain increases with any motion of her ankle non weight bearing and any weight bearing activity. Her pain is posterior heel and achilles and lateral ankle.  She states the pain has been improving with these treatments.    Location of Pain: right lateral foot and posterior ankle  Duration of Pain: 2.5 week(s)  Rating of Pain at worst: 9/10  Rating of Pain Currently: 4/10  Symptoms are better with: Ice, Aleve, Ibuprofen, Rest and Elevation  Symptoms are worse with: weight bearing  Additional Features:   Positive: swelling and weakness   Negative: bruising, popping, grinding, catching, locking, instability, paresthesias and numbness  Other evaluation and/or treatments so far consists of: Nothing  Prior History of related problems: nothing    Social History:     Review of Systems  Skin: no bruising, occasional swelling  Musculoskeletal: as above  Neurologic: no numbness, paresthesias  Remainder of review of systems is negative including constitutional, CV, pulmonary, GI, except as noted in HPI or medical history.    Patient's current problem list, past medical and surgical history, and family history were reviewed.    Patient Active Problem List   Diagnosis     Anxiety disorder due to medical condition     Essential hypertension, benign     Sensorineural hearing loss     Peripheral vertigo     Allergic rhinitis     Symptomatic menopausal or female climacteric states     Psoriasis     Hyperlipidemia LDL goal <130     Kidney stones     Hypertriglyceridemia     Meniere disease     Anxiety     Kidney stone     Past Medical History:   Diagnosis Date     Abnormal Papanicolaou smear of cervix and cervical HPV  1986     Anxiety      Anxiety disorder in conditions classified elsewhere      Calculus of kidney 08/11/1988    Calcium renal stone, oxylate     Calculus of ureter 06/30/1985    LEFT urteral stone     Excessive or frequent menstruation 07/07/2003     Labyrinthitis, unspecified      Lump or mass in breast 10/27/1999    RIGHT breast mass     Variants of migraine, not elsewhere classified, without mention of intractable migraine without mention of status migrainosus 02/07/2002     Past Surgical History:   Procedure Laterality Date     CARPAL TUNNEL RELEASE RT/LT  08/2008    Left      COLONOSCOPY  8/7/2012    Procedure: COLONOSCOPY;  Colonoscopy;  Surgeon: Kailyn Lopez MD;  Location: [facility]     CYSTOSCOPY, RETROGRADES, INSERT STENT URETER(S), COMBINED Left 8/19/2016    Procedure: COMBINED CYSTOSCOPY, RETROGRADES, INSERT STENT URETER(S);  Surgeon: Terence Carpenter MD;  [facility]     esteem implant  06/23/2011    Hearing implant right ear.     HYSTERECTOMY, PAP NO LONGER INDICATED  12/2004    Vaginal hysterectomy     LAPAROSCOPIC ABLATION ENDOMETRIOSIS  07/2004     LASER HOLMIUM LITHOTRIPSY URETER(S), INSERT STENT, COMBINED Left 9/12/2016    Procedure: COMBINED CYSTOSCOPY, URETEROSCOPY, LASER HOLMIUM LITHOTRIPSY URETER(S), INSERT STENT;  Surgeon: Kailyn Dozier MD;  [facility]     LEFT wrist surgery       Sinus surgery with repair of deviated septum       TUBAL LIGATION       Family History   Problem Relation Age of Onset     Heart Disease Brother      Hypertension Brother      Asthma Daughter      Heart Disease Brother      Hypertension Brother      C.A.D. Father         MI      Hypertension Father      Lipids Father 50     C.A.D. Paternal Aunt         MI-50s     Cancer Maternal Aunt         Ovarian CA     Cerebrovascular Disease No family hx of      Breast Cancer No family hx of      Cancer - colorectal No family hx of      Prostate Cancer No family hx of          Objective  Ht  "1.613 m (5' 3.5\")   Wt 96.6 kg (213 lb)   BMI 37.14 kg/m      GENERAL APPEARANCE: healthy, alert and no distress   GAIT: slightly antalgic  SKIN: no suspicious lesions or rashes  HEENT: Sclera clear, anicteric  CV: no lower extremity edema  RESP: Breathing not labored  NEURO: Normal strength and tone, mentation intact and speech normal  PSYCH:  mentation appears normal and affect normal/bright    Bilateral Foot and Ankle Exam:  Inspection:       no visible ecchymosis       no visible edema or effusion    Foot inspection:       pes planus    Tender:       Posterior achilles with some swelling, peroneal tendonitis    Non-Tender:       remainder of ankle and foot bilateral    ROM:        Full active and passive ROM, ankle dorsiflexion, plantarflexion, inversion, eversion, great toe dorsiflexion, remainder of toes, midfoot and subtalar bilateral  - pain with inversion and eversion over lateral ankle    Strength:       ankle dorsiflexion 5/5 bilateral       plantarflexion 5/5 bilateral       inversion 5/5 bilateral       eversion 5/5 bilateral    Special Tests:       neg (-) anterior drawer right       neg (-) talar tilt right    Gait:       Slightly antalgic gait    Lower extremity alignment:       Normal    Neurovascular:       2+ peripheral pulses bilaterally and brisk capillary refill       sensation grossly intact      Radiology  I visualized and reviewed these images with the patient  Xr Foot Right G/e 3 Views  Result Date: 3/18/2019  XR FOOT RT G/E 3 VW 3/18/2019 1:56 PM HISTORY: Right heel pain. COMPARISON: 12/14/2016.   IMPRESSION: 3 views of the right fourth show a no acute osseous finding. Bony spurring off the plantar and posterior aspects of the calcaneus is again seen. There is been slight progression at the posterior site since the prior study. TANJA MARMOLEJO MD    Assessment:  1. Achilles tendinitis of right lower extremity    2. Left Achilles bursitis    3. Peroneal tendinitis of left lower extremity  "     Symptoms most consistent with Achilles tendinosis and insertional bursitis along with peroneal tendinosis.  We discussed potential treatment including walking boot immobilization for 1-2 weeks, consideration of heel cups, ankle braces or shoe inserts.  Physical therapy.  Given improvement patient will continue with heel cups, rest and ice for now.  Will consider physical therapy and walking boot pending clinical course.    Patient Instructions   Plan:  - Today's Plan of Care:  Continue with rest and ice  Use Heel cups or brace as needed  Use crutches as needed    -We also discussed other future treatment options:  Rehab: Physical Therapy  Medical Equipment: walking boot  Shoe inserts    Follow Up: as needed    Concerning signs and symptoms were reviewed.  The patient expressed understanding of this management plan and all questions were answered at this time.    Bev Lao MD CAQ  Primary Care Sports Medicine  [facility]

## 2019-04-03 NOTE — LETTER
4/3/2019         RE: Kenya Rubalcava  [address] [place] [address]        Dear Colleague,    Thank you for referring your patient, Kenya Rubalcava, to the [facility]. Please see a copy of my visit note below.    Sports Medicine Clinic Visit    PCP: Carlie Real    Kenya Rubalcava is a 58 year old female who is seen as a self referral presenting with right heel pain    Injury: She reports right foot pain for 2.5 weeks. She reports her pain increased while walking a lot on vacations. She reports her pain increases with any motion of her ankle non weight bearing and any weight bearing activity. Her pain is posterior heel and achilles and lateral ankle.  She states the pain has been improving with these treatments.    Location of Pain: right lateral foot and posterior ankle  Duration of Pain: 2.5 week(s)  Rating of Pain at worst: 9/10  Rating of Pain Currently: 4/10  Symptoms are better with: Ice, Aleve, Ibuprofen, Rest and Elevation  Symptoms are worse with: weight bearing  Additional Features:   Positive: swelling and weakness   Negative: bruising, popping, grinding, catching, locking, instability, paresthesias and numbness  Other evaluation and/or treatments so far consists of: Nothing  Prior History of related problems: nothing    Social History:     Review of Systems  Skin: no bruising, occasional swelling  Musculoskeletal: as above  Neurologic: no numbness, paresthesias  Remainder of review of systems is negative including constitutional, CV, pulmonary, GI, except as noted in HPI or medical history.    Patient's current problem list, past medical and surgical history, and family history were reviewed.    Patient Active Problem List   Diagnosis     Anxiety disorder due to medical condition     Essential hypertension, benign     Sensorineural hearing loss     Peripheral vertigo     Allergic rhinitis     Symptomatic menopausal or female  climacteric states     Psoriasis     Hyperlipidemia LDL goal <130     Kidney stones     Hypertriglyceridemia     Meniere disease     Anxiety     Kidney stone     Past Medical History:   Diagnosis Date     Abnormal Papanicolaou smear of cervix and cervical HPV 1986     Anxiety      Anxiety disorder in conditions classified elsewhere      Calculus of kidney 08/11/1988    Calcium renal stone, oxylate     Calculus of ureter 06/30/1985    LEFT urteral stone     Excessive or frequent menstruation 07/07/2003     Labyrinthitis, unspecified      Lump or mass in breast 10/27/1999    RIGHT breast mass     Variants of migraine, not elsewhere classified, without mention of intractable migraine without mention of status migrainosus 02/07/2002     Past Surgical History:   Procedure Laterality Date     CARPAL TUNNEL RELEASE RT/LT  08/2008    Left      COLONOSCOPY  8/7/2012    Procedure: COLONOSCOPY;  Colonoscopy;  Surgeon: Kailyn Lopez MD;  Location: [facility]     CYSTOSCOPY, RETROGRADES, INSERT STENT URETER(S), COMBINED Left 8/19/2016    Procedure: COMBINED CYSTOSCOPY, RETROGRADES, INSERT STENT URETER(S);  Surgeon: Terence Carpenter MD;  Location:  OR     esteem implant  06/23/2011    Hearing implant right ear.     HYSTERECTOMY, PAP NO LONGER INDICATED  12/2004    Vaginal hysterectomy     LAPAROSCOPIC ABLATION ENDOMETRIOSIS  07/2004     LASER HOLMIUM LITHOTRIPSY URETER(S), INSERT STENT, COMBINED Left 9/12/2016    Procedure: COMBINED CYSTOSCOPY, URETEROSCOPY, LASER HOLMIUM LITHOTRIPSY URETER(S), INSERT STENT;  Surgeon: Kailyn Dozier MD;  Location:  OR     LEFT wrist surgery       Sinus surgery with repair of deviated septum       TUBAL LIGATION       Family History   Problem Relation Age of Onset     Heart Disease Brother      Hypertension Brother      Asthma Daughter      Heart Disease Brother      Hypertension Brother      C.A.D. Father         MI      Hypertension Father      Lipids Father 50      "CATHERINE Paternal Aunt         MI-50s     Cancer Maternal Aunt         Ovarian CA     Cerebrovascular Disease No family hx of      Breast Cancer No family hx of      Cancer - colorectal No family hx of      Prostate Cancer No family hx of          Objective  Ht 1.613 m (5' 3.5\")   Wt 96.6 kg (213 lb)   BMI 37.14 kg/m       GENERAL APPEARANCE: healthy, alert and no distress   GAIT: slightly antalgic  SKIN: no suspicious lesions or rashes  HEENT: Sclera clear, anicteric  CV: no lower extremity edema  RESP: Breathing not labored  NEURO: Normal strength and tone, mentation intact and speech normal  PSYCH:  mentation appears normal and affect normal/bright    Bilateral Foot and Ankle Exam:  Inspection:       no visible ecchymosis       no visible edema or effusion    Foot inspection:       pes planus    Tender:       Posterior achilles with some swelling, peroneal tendonitis    Non-Tender:       remainder of ankle and foot bilateral    ROM:        Full active and passive ROM, ankle dorsiflexion, plantarflexion, inversion, eversion, great toe dorsiflexion, remainder of toes, midfoot and subtalar bilateral  - pain with inversion and eversion over lateral ankle    Strength:       ankle dorsiflexion 5/5 bilateral       plantarflexion 5/5 bilateral       inversion 5/5 bilateral       eversion 5/5 bilateral    Special Tests:       neg (-) anterior drawer right       neg (-) talar tilt right    Gait:       Slightly antalgic gait    Lower extremity alignment:       Normal    Neurovascular:       2+ peripheral pulses bilaterally and brisk capillary refill       sensation grossly intact      Radiology  I visualized and reviewed these images with the patient  Xr Foot Right G/e 3 Views  Result Date: 3/18/2019  XR FOOT RT G/E 3 VW 3/18/2019 1:56 PM HISTORY: Right heel pain. COMPARISON: 12/14/2016.   IMPRESSION: 3 views of the right fourth show a no acute osseous finding. Bony spurring off the plantar and posterior aspects of the " calcaneus is again seen. There is been slight progression at the posterior site since the prior study. TANJA MARMOLEJO MD    Assessment:  1. Achilles tendinitis of right lower extremity    2. Left Achilles bursitis    3. Peroneal tendinitis of left lower extremity      Symptoms most consistent with Achilles tendinosis and insertional bursitis along with peroneal tendinosis.  We discussed potential treatment including walking boot immobilization for 1-2 weeks, consideration of heel cups, ankle braces or shoe inserts.  Physical therapy.  Given improvement patient will continue with heel cups, rest and ice for now.  Will consider physical therapy and walking boot pending clinical course.    Patient Instructions   Plan:  - Today's Plan of Care:  Continue with rest and ice  Use Heel cups or brace as needed  Use crutches as needed    -We also discussed other future treatment options:  Rehab: Physical Therapy  Medical Equipment: walking boot  Shoe inserts    Follow Up: as needed    Concerning signs and symptoms were reviewed.  The patient expressed understanding of this management plan and all questions were answered at this time.    Bev Lao MD [facility]  Primary Care Sports Medicine  [facility]    Again, thank you for allowing me to participate in the care of your patient.        Sincerely,        Bev Lao MD

## 2019-06-15 ENCOUNTER — HOSPITAL ENCOUNTER (EMERGENCY)
Facility: CLINIC | Age: 59
Discharge: HOME | End: 2019-06-15
Attending: NURSE PRACTITIONER | Admitting: NURSE PRACTITIONER
Payer: COMMERCIAL

## 2019-06-15 VITALS
HEIGHT: 63 IN | TEMPERATURE: 97.8 F | OXYGEN SATURATION: 97 % | DIASTOLIC BLOOD PRESSURE: 76 MMHG | BODY MASS INDEX: 33.66 KG/M2 | SYSTOLIC BLOOD PRESSURE: 125 MMHG | RESPIRATION RATE: 16 BRPM | WEIGHT: 190 LBS

## 2019-06-15 DIAGNOSIS — J20.9 ACUTE BRONCHITIS: ICD-10-CM

## 2019-06-15 PROCEDURE — G0463 HOSPITAL OUTPT CLINIC VISIT: HCPCS | Performed by: NURSE PRACTITIONER

## 2019-06-15 PROCEDURE — 99214 OFFICE O/P EST MOD 30 MIN: CPT | Mod: Z6 | Performed by: NURSE PRACTITIONER

## 2019-06-15 RX ORDER — BENZONATATE 100 MG/1
100 CAPSULE ORAL 3 TIMES DAILY PRN
Qty: 30 CAPSULE | Refills: 0 | Status: SHIPPED | OUTPATIENT
Start: 2019-06-15 | End: 2019-07-19

## 2019-06-15 ASSESSMENT — ENCOUNTER SYMPTOMS
SINUS PAIN: 0
LIGHT-HEADEDNESS: 0
FATIGUE: 1
SINUS PRESSURE: 1
VOMITING: 0
EYE DISCHARGE: 0
FEVER: 0
CHILLS: 0
SHORTNESS OF BREATH: 0
DIARRHEA: 0
NAUSEA: 0
EYE REDNESS: 0
WEAKNESS: 0
HEADACHES: 0
SORE THROAT: 0
RHINORRHEA: 1
ABDOMINAL PAIN: 0
DIZZINESS: 0
COUGH: 1
STRIDOR: 0
WHEEZING: 0

## 2019-06-15 ASSESSMENT — MIFFLIN-ST. JEOR: SCORE: 1415.96

## 2019-06-15 NOTE — ED PROVIDER NOTES
History     Chief Complaint   Patient presents with     Cough     started over a week ago with s/t whichis better, now has cough that is making it hard to sleep,      HPI  Kenya Rubalcava is a 58 year old female who presents to the urgent care for complaints of cough for 1.5 weeks. Accompanying symptoms of nasal congestion, rhinorrhea, fatigue and slight sinus pressure. Resolved sore throat, denies fevers, nausea, headache, or ear pain. Patient has tried Pamela seltzer cold medicine and mucinex at home with minimal relief.     Allergies:  Allergies   Allergen Reactions     Ivp Dye [Contrast Dye] Anaphylaxis and Hives     Cats Anaphylaxis     Niaspan [Niacin] Rash     Pollen Extract/Tree Extract        Problem List:    Patient Active Problem List    Diagnosis Date Noted     Kidney stone 07/19/2016     Priority: Medium     Anxiety 05/14/2015     Priority: Medium     Meniere disease 11/11/2010     Priority: Medium     Diagnosed 10/10 - active in left ear. Trying dyazide daily, serax twice daily, meclizine qid        Kidney stones 06/15/2009     Priority: Medium     Bilateral noted on CT 5/23/09       Hypertriglyceridemia 06/15/2009     Priority: Medium     Hyperlipidemia LDL goal <130 06/30/2008     Priority: Medium     Recent Labs   Lab Test 11/26/10 0847 7/1/10 0927     CHOL 188 164     HDL 28* 27*     LDL Cannot estimate LDL when triglyceride exceeds 400 mg/dL93 Cannot estimate LDL when triglyceride exceeds 400 mg/dL     TRIG 443* 431*     CHOLHDLRATIO 7.0* 6.0*     11/29/10 - Just added fenofibrate and recheck in 2 months lipid/ast        Psoriasis 05/22/2008     Priority: Medium     Less than 1% body surface area - 1% ointment Triamcinalone, scalp Head&Shoulders or Nizoral Truchas-Smoothe FS prescriptions.       Symptomatic menopausal or female climacteric states 06/25/2007     Priority: Medium     Peripheral vertigo 09/06/2006     Priority: Medium     Problem list name updated by automated process. Provider to  review       Essential hypertension, benign 05/02/2006     Priority: Medium     Anxiety disorder due to medical condition      Priority: Medium     Treatment tried:  Buspar 7.5 mg bid (stopped 03/21/2005), Celexa started 03/21/2005    Problem list name updated by automated process. Provider to review       Allergic rhinitis 06/16/2004     Priority: Medium     Environmental allergies  Problem list name updated by automated process. Provider to review       Sensorineural hearing loss 07/05/2002     Priority: Medium     Referred to Dr Street, ENT Yefri Honeycutt by previous clinic  Problem list name updated by automated process. Provider to review          Past Medical History:    Past Medical History:   Diagnosis Date     Abnormal Papanicolaou smear of cervix and cervical HPV 1986     Anxiety      Anxiety disorder in conditions classified elsewhere      Calculus of kidney 08/11/1988     Calculus of ureter 06/30/1985     Excessive or frequent menstruation 07/07/2003     Labyrinthitis, unspecified      Lump or mass in breast 10/27/1999     Variants of migraine, not elsewhere classified, without mention of intractable migraine without mention of status migrainosus 02/07/2002       Past Surgical History:    Past Surgical History:   Procedure Laterality Date     CARPAL TUNNEL RELEASE RT/LT  08/2008    Left      COLONOSCOPY  8/7/2012    Procedure: COLONOSCOPY;  Colonoscopy;  Surgeon: Kailyn Lopez MD;  Location: [facility]     CYSTOSCOPY, RETROGRADES, INSERT STENT URETER(S), COMBINED Left 8/19/2016    Procedure: COMBINED CYSTOSCOPY, RETROGRADES, INSERT STENT URETER(S);  Surgeon: Terence Carpenter MD;  Location: [facility]     esteem implant  06/23/2011    Hearing implant right ear.     HYSTERECTOMY, PAP NO LONGER INDICATED  12/2004    Vaginal hysterectomy     LAPAROSCOPIC ABLATION ENDOMETRIOSIS  07/2004     LASER HOLMIUM LITHOTRIPSY URETER(S), INSERT STENT, COMBINED Left 9/12/2016    Procedure: COMBINED  CYSTOSCOPY, URETEROSCOPY, LASER HOLMIUM LITHOTRIPSY URETER(S), INSERT STENT;  Surgeon: Kailyn Dozier MD;  Location: [facility]     LEFT wrist surgery       Sinus surgery with repair of deviated septum       TUBAL LIGATION         Family History:    Family History   Problem Relation Age of Onset     Heart Disease Brother      Hypertension Brother      Asthma Daughter      Heart Disease Brother      Hypertension Brother      C.A.D. Father         MI      Hypertension Father      Lipids Father 50     C.A.D. Paternal Aunt         MI-50s     Cancer Maternal Aunt         Ovarian CA     Cerebrovascular Disease No family hx of      Breast Cancer No family hx of      Cancer - colorectal No family hx of      Prostate Cancer No family hx of        Social History:  Marital Status:  Single [1]  Social History     Tobacco Use     Smoking status: Never Smoker     Smokeless tobacco: Never Used   Substance Use Topics     Alcohol use: Yes     Alcohol/week: 0.0 oz     Comment: rare      Drug use: No        Medications:      benzonatate (TESSALON) 100 MG capsule   ASPIRIN 325 MG OR TBEC   atenolol (TENORMIN) 50 MG tablet   betamethasone dipropionate (DIPROSONE) 0.05 % ointment   diazepam (VALIUM) 5 MG tablet   DIPHENHYDRAMINE HCL PO   Fluocinolone Acetonide Scalp 0.01 % OIL oil   fluticasone (FLONASE) 50 MCG/ACT nasal spray   meclizine (ANTIVERT) 25 MG tablet   naproxen (NAPROSYN) 500 MG tablet   order for DME   pravastatin (PRAVACHOL) 40 MG tablet   prochlorperazine (COMPAZINE) 10 MG tablet   traZODone (DESYREL) 50 MG tablet   triamterene-HCTZ (DYAZIDE) 37.5-25 MG capsule   venlafaxine (EFFEXOR-XR) 150 MG 24 hr capsule         Review of Systems   Constitutional: Positive for fatigue. Negative for chills and fever.   HENT: Positive for congestion, rhinorrhea and sinus pressure. Negative for ear pain, sinus pain and sore throat.    Eyes: Negative for discharge and redness.   Respiratory: Positive for cough. Negative for shortness  "of breath, wheezing and stridor.    Cardiovascular: Negative for chest pain.   Gastrointestinal: Negative for abdominal pain, diarrhea, nausea and vomiting.   Skin: Negative for rash.   Neurological: Negative for dizziness, weakness, light-headedness and headaches.       Physical Exam   BP: 125/76  Heart Rate: 61  Temp: 97.8  F (36.6  C)  Resp: 16  Height: 160 cm (5' 3\")  Weight: 86.2 kg (190 lb)  SpO2: 97 %      Physical Exam   Constitutional: She appears well-developed and well-nourished. No distress.   HENT:   Right Ear: Tympanic membrane normal.   Left Ear: Tympanic membrane normal.   Mouth/Throat: Oropharynx is clear and moist and mucous membranes are normal.   Eyes: Pupils are equal, round, and reactive to light. Conjunctivae are normal.   Neck: Normal range of motion. Neck supple.   Cardiovascular: Normal rate and regular rhythm.   Pulmonary/Chest: Effort normal and breath sounds normal. No respiratory distress. She has no wheezes. She has no rales.   Abdominal: Soft. She exhibits no distension. There is no tenderness.   Lymphadenopathy:     She has no cervical adenopathy.   Skin: Skin is warm. Capillary refill takes less than 2 seconds. She is not diaphoretic.       ED Course        Procedures               No results found for this or any previous visit (from the past 24 hour(s)).    Medications - No data to display    Assessments & Plan (with Medical Decision Making)   Patient is a 58 year old female who presents to the urgent care for complaints of cough. Patient with a normal physical exam showing no decrease in breath sounds, equal and open bilaterally. Plan to try benzonatate as needed for cough. Increase rest and hydration. May continue over the counter medications as needed. Return precautions reviewed, all questions answered, patient agreeable to plan of care. Patient discharged home in no distress.   I have reviewed the nursing notes.    I have reviewed the findings, diagnosis, plan and need for " follow up with the patient.       Medication List      Started    benzonatate 100 MG capsule  Commonly known as:  TESSALON  100 mg, Oral, 3 TIMES DAILY PRN            Final diagnoses:   Acute bronchitis       6/15/2019   [facility] EMERGENCY DEPARTMENT     Georgette Reynoso, APRN CNP  06/15/19 1539

## 2019-06-15 NOTE — ED AVS SNAPSHOT
[facility] Emergency Department  [address] [address]  Phone:  [phone]  Fax:  [phone]                                    Kenya Rubalcava   MRN: [id number]    Department:  [facility] Emergency Department   Date of Visit:  6/15/2019           After Visit Summary Signature Page    I have received my discharge instructions, and my questions have been answered. I have discussed any challenges I see with this plan with the nurse or doctor.    ..........................................................................................................................................  Patient/Patient Representative Signature      ..........................................................................................................................................  Patient Representative Print Name and Relationship to Patient    ..................................................               ................................................  Date                                   Time    ..........................................................................................................................................  Reviewed by Signature/Title    ...................................................              ..............................................  Date                                               Time          22EPIC Rev 08/18

## 2019-06-17 DIAGNOSIS — G47.00 PERSISTENT INSOMNIA: ICD-10-CM

## 2019-06-17 NOTE — TELEPHONE ENCOUNTER
"traZODone HCl Oral Tablet 50 MG      Last Written Prescription Date:  12/5/18  Last Fill Quantity: 270,   # refills: 1  Last Office Visit: 3/18/19  Future Office visit:       Requested Prescriptions   Pending Prescriptions Disp Refills     traZODone (DESYREL) 50 MG tablet [Pharmacy Med Name: traZODone HCl Oral Tablet 50 MG] 270 tablet 0     Sig: Take 1-3 tablets ( mg) by mouth nightly At Bedtime       Serotonin Modulators Passed - 6/17/2019  7:00 AM        Passed - Recent (12 mo) or future (30 days) visit within the authorizing provider's specialty     Patient had office visit in the last 12 months or has a visit in the next 30 days with authorizing provider or within the authorizing provider's specialty.  See \"Patient Info\" tab in inbasket, or \"Choose Columns\" in Meds & Orders section of the refill encounter.              Passed - Medication is active on med list        Passed - Patient is age 18 or older        Passed - No active pregnancy on record        Passed - No positive pregnancy test in past 12 months          "

## 2019-06-18 RX ORDER — TRAZODONE HYDROCHLORIDE 50 MG/1
TABLET, FILM COATED ORAL
Qty: 270 TABLET | Refills: 0 | Status: SHIPPED | OUTPATIENT
Start: 2019-06-18 | End: 2019-07-19

## 2019-06-20 DIAGNOSIS — F41.9 ANXIETY: ICD-10-CM

## 2019-06-21 RX ORDER — VENLAFAXINE HYDROCHLORIDE 150 MG/1
CAPSULE, EXTENDED RELEASE ORAL
Qty: 90 CAPSULE | Refills: 0 | Status: SHIPPED | OUTPATIENT
Start: 2019-06-21 | End: 2019-07-19

## 2019-06-21 NOTE — TELEPHONE ENCOUNTER
"Requested Prescriptions   Pending Prescriptions Disp Refills     venlafaxine (EFFEXOR-XR) 150 MG 24 hr capsule [Pharmacy Med Name: Venlafaxine HCl ER Oral Capsule Extended Release 24 Hour 150 MG]  Last Written Prescription Date:  3/27/19  Last Fill Quantity: 90,  # refills: 0   Last office visit: 3/18/2019 with prescribing provider:  christina   Future Office Visit:     90 capsule 0     Sig: Take 1 capsule  by mouth daily       Serotonin-Norepinephrine Reuptake Inhibitors  Passed - 6/20/2019  6:14 PM        Passed - Blood pressure under 140/90 in past 12 months     BP Readings from Last 3 Encounters:   06/15/19 125/76   03/18/19 130/86   02/11/19 105/44                 Passed - Recent (12 mo) or future (30 days) visit within the authorizing provider's specialty     Patient had office visit in the last 12 months or has a visit in the next 30 days with authorizing provider or within the authorizing provider's specialty.  See \"Patient Info\" tab in inbasket, or \"Choose Columns\" in Meds & Orders section of the refill encounter.              Passed - Medication is active on med list        Passed - Patient is age 18 or older        Passed - No active pregnancy on record        Passed - Normal serum creatinine on file in past 12 months     Recent Labs   Lab Test 09/27/18  1316   CR 0.79             Passed - No positive pregnancy test in past 12 months          "

## 2019-06-21 NOTE — TELEPHONE ENCOUNTER
Routing refill request to provider for review/approval because:  No phq9 on file.  Kary Costello RN

## 2019-07-13 ENCOUNTER — MYC REFILL (OUTPATIENT)
Dept: FAMILY MEDICINE | Facility: CLINIC | Age: 59
End: 2019-07-13

## 2019-07-13 DIAGNOSIS — F41.9 ANXIETY: Primary | ICD-10-CM

## 2019-07-13 DIAGNOSIS — H81.03 MENIERE'S DISEASE, BILATERAL: ICD-10-CM

## 2019-07-13 DIAGNOSIS — F06.4 ANXIETY DISORDER DUE TO MEDICAL CONDITION: ICD-10-CM

## 2019-07-15 RX ORDER — DIAZEPAM 5 MG
5 TABLET ORAL EVERY 12 HOURS PRN
Qty: 30 TABLET | Refills: 0 | Status: SHIPPED | OUTPATIENT
Start: 2019-07-15 | End: 2019-11-09

## 2019-07-15 NOTE — TELEPHONE ENCOUNTER
Routing refill request to provider for review/approval because:  Drug not on the [facility] refill protocol   Routing to covering providers for PCP.    Requested Prescriptions   Pending Prescriptions Disp Refills     diazepam (VALIUM) 5 MG tablet 60 tablet 0       There is no refill protocol information for this order        REYNA Keys

## 2019-07-15 NOTE — TELEPHONE ENCOUNTER
Dr Real patient.   Last got #60 tablets in 2/2019 for anxiety/sleep.  Was last seen in our clinic in 12/2018    No documentation of valium being an ongoing prn medication.    Should come in to discuss ongoing plan with valium.    I will fill #30 today.    Please let her know     MOHIT Garcia MD

## 2019-07-18 ENCOUNTER — HOSPITAL ENCOUNTER (OUTPATIENT)
Dept: MAMMOGRAPHY | Facility: CLINIC | Age: 59
Discharge: HOME | End: 2019-07-18
Attending: FAMILY MEDICINE | Admitting: FAMILY MEDICINE
Payer: COMMERCIAL

## 2019-07-18 DIAGNOSIS — Z12.31 VISIT FOR SCREENING MAMMOGRAM: ICD-10-CM

## 2019-07-18 PROCEDURE — 77063 BREAST TOMOSYNTHESIS BI: CPT

## 2019-07-19 ENCOUNTER — OFFICE VISIT (OUTPATIENT)
Dept: FAMILY MEDICINE | Facility: CLINIC | Age: 59
End: 2019-07-19
Payer: COMMERCIAL

## 2019-07-19 VITALS
DIASTOLIC BLOOD PRESSURE: 67 MMHG | SYSTOLIC BLOOD PRESSURE: 115 MMHG | TEMPERATURE: 97.6 F | RESPIRATION RATE: 12 BRPM | HEIGHT: 63 IN | WEIGHT: 210.3 LBS | BODY MASS INDEX: 37.26 KG/M2 | HEART RATE: 59 BPM

## 2019-07-19 DIAGNOSIS — F41.9 ANXIETY: ICD-10-CM

## 2019-07-19 DIAGNOSIS — Z00.00 ROUTINE GENERAL MEDICAL EXAMINATION AT A HEALTH CARE FACILITY: Primary | ICD-10-CM

## 2019-07-19 DIAGNOSIS — E66.01 MORBID OBESITY (H): ICD-10-CM

## 2019-07-19 DIAGNOSIS — N20.0 KIDNEY STONES: ICD-10-CM

## 2019-07-19 DIAGNOSIS — F51.01 PRIMARY INSOMNIA: ICD-10-CM

## 2019-07-19 DIAGNOSIS — L40.9 PSORIASIS: ICD-10-CM

## 2019-07-19 DIAGNOSIS — E78.1 HYPERTRIGLYCERIDEMIA: ICD-10-CM

## 2019-07-19 DIAGNOSIS — H81.09 MENIERE'S DISEASE, UNSPECIFIED LATERALITY: ICD-10-CM

## 2019-07-19 DIAGNOSIS — E78.5 HYPERLIPIDEMIA LDL GOAL <130: ICD-10-CM

## 2019-07-19 DIAGNOSIS — I10 ESSENTIAL HYPERTENSION, BENIGN: ICD-10-CM

## 2019-07-19 PROCEDURE — 90471 IMMUNIZATION ADMIN: CPT | Performed by: FAMILY MEDICINE

## 2019-07-19 PROCEDURE — 90714 TD VACC NO PRESV 7 YRS+ IM: CPT | Performed by: FAMILY MEDICINE

## 2019-07-19 PROCEDURE — 99396 PREV VISIT EST AGE 40-64: CPT | Mod: 25 | Performed by: FAMILY MEDICINE

## 2019-07-19 RX ORDER — TRIAMTERENE AND HYDROCHLOROTHIAZIDE 37.5; 25 MG/1; MG/1
1 CAPSULE ORAL EVERY MORNING
Qty: 90 CAPSULE | Refills: 3 | Status: SHIPPED | OUTPATIENT
Start: 2019-07-19 | End: 2020-08-31

## 2019-07-19 RX ORDER — BETAMETHASONE DIPROPIONATE 0.05 %
OINTMENT (GRAM) TOPICAL
Qty: 45 G | Refills: 1 | Status: SHIPPED | OUTPATIENT
Start: 2019-07-19 | End: 2020-07-29

## 2019-07-19 RX ORDER — VENLAFAXINE HYDROCHLORIDE 150 MG/1
150 CAPSULE, EXTENDED RELEASE ORAL DAILY
Qty: 90 CAPSULE | Refills: 3 | Status: SHIPPED | OUTPATIENT
Start: 2019-07-19 | End: 2020-08-31

## 2019-07-19 RX ORDER — ATENOLOL 50 MG/1
50 TABLET ORAL DAILY
Qty: 90 TABLET | Refills: 3 | Status: SHIPPED | OUTPATIENT
Start: 2019-07-19 | End: 2020-08-31

## 2019-07-19 RX ORDER — PRAVASTATIN SODIUM 40 MG
TABLET ORAL
Qty: 90 TABLET | Refills: 3 | Status: SHIPPED | OUTPATIENT
Start: 2019-07-19 | End: 2020-06-05

## 2019-07-19 RX ORDER — TRAZODONE HYDROCHLORIDE 150 MG/1
150 TABLET ORAL AT BEDTIME
Qty: 90 TABLET | Refills: 3 | Status: SHIPPED | OUTPATIENT
Start: 2019-07-19 | End: 2020-07-20

## 2019-07-19 ASSESSMENT — ANXIETY QUESTIONNAIRES
GAD7 TOTAL SCORE: 6
7. FEELING AFRAID AS IF SOMETHING AWFUL MIGHT HAPPEN: NOT AT ALL
6. BECOMING EASILY ANNOYED OR IRRITABLE: SEVERAL DAYS
3. WORRYING TOO MUCH ABOUT DIFFERENT THINGS: SEVERAL DAYS
2. NOT BEING ABLE TO STOP OR CONTROL WORRYING: SEVERAL DAYS
1. FEELING NERVOUS, ANXIOUS, OR ON EDGE: SEVERAL DAYS
5. BEING SO RESTLESS THAT IT IS HARD TO SIT STILL: SEVERAL DAYS

## 2019-07-19 ASSESSMENT — MIFFLIN-ST. JEOR: SCORE: 1508.04

## 2019-07-19 ASSESSMENT — PATIENT HEALTH QUESTIONNAIRE - PHQ9
SUM OF ALL RESPONSES TO PHQ QUESTIONS 1-9: 6
5. POOR APPETITE OR OVEREATING: SEVERAL DAYS

## 2019-07-19 NOTE — NURSING NOTE
Screening Questionnaire for Adult Immunization    Are you sick today?   No   Do you have allergies to medications, food, a vaccine component or latex?   No   Have you ever had a serious reaction after receiving a vaccination?   No   Do you have a long-term health problem with heart disease, lung disease, asthma, kidney disease, metabolic disease (e.g. diabetes), anemia, or other blood disorder?   Don't Know   Kidney stones   Do you have cancer, leukemia, HIV/AIDS, or any other immune system problem?   No   In the past 3 months, have you taken medications that affect  your immune system, such as prednisone, other steroids, or anticancer drugs; drugs for the treatment of rheumatoid arthritis, Crohn s disease, or psoriasis; or have you had radiation treatments?   No   Have you had a seizure, or a brain or other nervous system problem?   No   During the past year, have you received a transfusion of blood or blood     products, or been given immune (gamma) globulin or antiviral drug?   No   For women: Are you pregnant or is there a chance you could become        pregnant during the next month?   No   Have you received any vaccinations in the past 4 weeks?   No     Immunization questionnaire was positive for at least one answer.  Notified Dr. Briseyda Garcia.        Per orders of Dr. Briseyda Garcia, injection of TD given by Dolores COHEN LPN. Patient instructed to remain in clinic for 15 minutes afterwards, and to report any adverse reaction to me immediately.       Screening performed by Dolores Bullock on 7/19/2019 at 2:25 PM.

## 2019-07-19 NOTE — PROGRESS NOTES
SUBJECTIVE:   CC: Kenya Rubalcava is an 58 year old woman who presents for preventive health visit.     Healthy Habits:    Do you get at least three servings of calcium containing foods daily (dairy, green leafy vegetables, etc.)? Tries to     Amount of exercise or daily activities, outside of work: 3 day(s) per week. Swimming or walking.     Problems taking medications regularly No    Medication side effects: No    Have you had an eye exam in the past two years? Yes- scheduled next week     Do you see a dentist twice per year? yes    Do you have sleep apnea, excessive snoring or daytime drowsiness?no    Job is very stressful  Has anxiety  Taking effexor     Has meniere's - effexor helps     Gets kidney stones off and on       Today's PHQ-2 Score: 0  PHQ-2 ( 1999 Pf[employer]) 7/19/2019 12/14/2017   Q1: Little interest or pleasure in doing things 0 0   Q2: Feeling down, depressed or hopeless 0 0   PHQ-2 Score 0 0   Q1: Little interest or pleasure in doing things - -   Q2: Feeling down, depressed or hopeless - -   PHQ-2 Score - -       Abuse: Current or Past(Physical, Sexual or Emotional)- No  Do you feel safe in your environment? Yes    Social History     Tobacco Use     Smoking status: Never Smoker     Smokeless tobacco: Never Used   Substance Use Topics     Alcohol use: Yes     Alcohol/week: 0.0 oz     Comment: rare      If you drink alcohol do you typically have >3 drinks per day or >7 drinks per week? No                     Reviewed orders with patient.  Reviewed health maintenance and updated orders accordingly - Yes  Labs reviewed in EPIC      Pertinent mammograms are reviewed under the imaging tab.  History of abnormal Pap smear: Last 3 Pap and HPV Results:       Reviewed and updated as needed this visit by clinical staff  Tobacco  Allergies  Meds  Med Hx  Surg Hx  Fam Hx  Soc Hx        Reviewed and updated as needed this visit by Provider          ROS:  CONSTITUTIONAL: NEGATIVE for fever, chills,  "change in weight  INTEGUMENTARY/SKIN: NEGATIVE for worrisome rashes, moles or lesions  EYES: NEGATIVE for vision changes or irritation  ENT: NEGATIVE for ear, mouth and throat problems  RESP: NEGATIVE for significant cough or SOB  BREAST: NEGATIVE for masses, tenderness or discharge  CV: NEGATIVE for chest pain, palpitations or peripheral edema  GI: NEGATIVE for nausea, abdominal pain, heartburn, or change in bowel habits  : NEGATIVE for unusual urinary or vaginal symptoms. No vaginal bleeding.  MUSCULOSKELETAL: NEGATIVE for significant arthralgias or myalgia  NEURO: NEGATIVE for weakness, dizziness or paresthesias  PSYCHIATRIC: NEGATIVE for changes in mood or affect     OBJECTIVE:   /67 (BP Location: Right arm, Patient Position: Sitting, Cuff Size: Adult Large)   Pulse 59   Temp 97.6  F (36.4  C) (Tympanic)   Resp 12   Ht 1.6 m (5' 3\")   Wt 95.4 kg (210 lb 4.8 oz)   BMI 37.25 kg/m    EXAM:  GENERAL: healthy, alert and no distress  EYES: Eyes grossly normal to inspection, PERRL and conjunctivae and sclerae normal  HENT: ear canals and TM's normal, nose and mouth without ulcers or lesions  NECK: no adenopathy, no asymmetry, masses, or scars and thyroid normal to palpation  RESP: lungs clear to auscultation - no rales, rhonchi or wheezes  BREAST: normal without masses, tenderness or nipple discharge and no palpable axillary masses or adenopathy  CV: regular rate and rhythm, normal S1 S2, no S3 or S4, no murmur, click or rub, no peripheral edema and peripheral pulses strong  ABDOMEN: soft, nontender, no hepatosplenomegaly, no masses and bowel sounds normal  MS: no gross musculoskeletal defects noted, no edema  NEURO: Normal strength and tone, mentation intact and speech normal  PSYCH: mentation appears normal, affect normal/bright      ASSESSMENT/PLAN:   (Z00.00) Routine general medical examination at a health care facility  (primary encounter diagnosis)  Comment: We discussed self breast exams, exercise " "30mins/day, and calcium with vitamin D at 1200mg/day, preferably from dietary sources.  Diet, Weight loss, and Exercise were discussed as well.   Plan:     (L40.9) Psoriasis  Comment: stable  Plan: betamethasone dipropionate (DIPROSONE) 0.05 %         external ointment            (I10) Essential hypertension, benign  Comment: meds refilled. Will get bmp next week with her lipid panel   Plan: atenolol (TENORMIN) 50 MG tablet,         triamterene-HCTZ (DYAZIDE) 37.5-25 MG capsule,         **Basic metabolic panel FUTURE 1yr            (E66.01) Morbid obesity (H)  Comment:   Plan:     (E78.5) Hyperlipidemia LDL goal <130  Comment: due for lipids  - fasting labs next week   Plan: pravastatin (PRAVACHOL) 40 MG tablet, Lipid         panel reflex to direct LDL Fasting            (F41.9) Anxiety  Comment: refilled   Plan: venlafaxine (EFFEXOR-XR) 150 MG 24 hr capsule            (F51.01) Primary insomnia  Comment: refilled   Plan: traZODone (DESYREL) 150 MG tablet            (H81.09) Meniere's disease, unspecified laterality  Comment: stable right now   Plan:     (N20.0) Kidney stones  Comment: discussed importance of hydration, especially in this heat   Plan:     (E78.1) Hypertriglyceridemia  Comment:   Plan: Lipid panel reflex to direct LDL Fasting              COUNSELING:   Reviewed preventive health counseling, as reflected in patient instructions       Regular exercise       Healthy diet/nutrition       HIV screeninx in teen years, 1x in adult years, and at intervals if high risk    Estimated body mass index is 33.66 kg/m  as calculated from the following:    Height as of 6/15/19: 1.6 m (5' 3\").    Weight as of 6/15/19: 86.2 kg (190 lb).    Weight management plan: Discussed healthy diet and exercise guidelines     reports that she has never smoked. She has never used smokeless tobacco.      Counseling Resources:  ATP IV Guidelines  Pooled Cohorts Equation Calculator  Breast Cancer Risk Calculator  FRAX Risk " Assessment  ICSI Preventive Guidelines  Dietary Guidelines for Americans, 2010  USDA's MyPlate  ASA Prophylaxis  Lung CA Screening    Briseyda Garcia MD  [facility]

## 2019-07-20 ASSESSMENT — ANXIETY QUESTIONNAIRES: GAD7 TOTAL SCORE: 6

## 2019-07-30 ENCOUNTER — TRANSFERRED RECORDS (OUTPATIENT)
Dept: HEALTH INFORMATION MANAGEMENT | Facility: CLINIC | Age: 59
End: 2019-07-30

## 2019-08-06 ENCOUNTER — OFFICE VISIT (OUTPATIENT)
Dept: FAMILY MEDICINE | Facility: CLINIC | Age: 59
End: 2019-08-06
Payer: COMMERCIAL

## 2019-08-06 VITALS
WEIGHT: 210.4 LBS | RESPIRATION RATE: 14 BRPM | SYSTOLIC BLOOD PRESSURE: 114 MMHG | DIASTOLIC BLOOD PRESSURE: 66 MMHG | HEART RATE: 67 BPM | HEIGHT: 63 IN | TEMPERATURE: 97.8 F | BODY MASS INDEX: 37.28 KG/M2

## 2019-08-06 DIAGNOSIS — H90.5 SENSORINEURAL HEARING LOSS (SNHL) OF RIGHT EAR, UNSPECIFIED HEARING STATUS ON CONTRALATERAL SIDE: ICD-10-CM

## 2019-08-06 DIAGNOSIS — I10 ESSENTIAL HYPERTENSION, BENIGN: ICD-10-CM

## 2019-08-06 DIAGNOSIS — E78.5 HYPERLIPIDEMIA LDL GOAL <130: ICD-10-CM

## 2019-08-06 DIAGNOSIS — E78.1 HYPERTRIGLYCERIDEMIA: ICD-10-CM

## 2019-08-06 DIAGNOSIS — Z01.818 PREOP GENERAL PHYSICAL EXAM: Primary | ICD-10-CM

## 2019-08-06 DIAGNOSIS — M65.30 TRIGGER FINGER, ACQUIRED: ICD-10-CM

## 2019-08-06 LAB
ANION GAP SERPL CALCULATED.3IONS-SCNC: 6 MMOL/L (ref 3–14)
BUN SERPL-MCNC: 17 MG/DL (ref 7–30)
CALCIUM SERPL-MCNC: 9.4 MG/DL (ref 8.5–10.1)
CHLORIDE SERPL-SCNC: 103 MMOL/L (ref 94–109)
CHOLEST SERPL-MCNC: 160 MG/DL
CO2 SERPL-SCNC: 29 MMOL/L (ref 20–32)
CREAT SERPL-MCNC: 0.74 MG/DL (ref 0.52–1.04)
ERYTHROCYTE [DISTWIDTH] IN BLOOD BY AUTOMATED COUNT: 12.9 % (ref 10–15)
GFR SERPL CREATININE-BSD FRML MDRD: 89 ML/MIN/{1.73_M2}
GLUCOSE SERPL-MCNC: 106 MG/DL (ref 70–99)
HCT VFR BLD AUTO: 40.1 % (ref 35–47)
HDLC SERPL-MCNC: 35 MG/DL
HGB BLD-MCNC: 13.7 G/DL (ref 11.7–15.7)
LDLC SERPL CALC-MCNC: 85 MG/DL
MCH RBC QN AUTO: 31.2 PG (ref 26.5–33)
MCHC RBC AUTO-ENTMCNC: 34.2 G/DL (ref 31.5–36.5)
MCV RBC AUTO: 91 FL (ref 78–100)
NONHDLC SERPL-MCNC: 125 MG/DL
PLATELET # BLD AUTO: 174 10E9/L (ref 150–450)
POTASSIUM SERPL-SCNC: 3.6 MMOL/L (ref 3.4–5.3)
RBC # BLD AUTO: 4.39 10E12/L (ref 3.8–5.2)
SODIUM SERPL-SCNC: 138 MMOL/L (ref 133–144)
TRIGL SERPL-MCNC: 202 MG/DL
WBC # BLD AUTO: 5.3 10E9/L (ref 4–11)

## 2019-08-06 PROCEDURE — 85027 COMPLETE CBC AUTOMATED: CPT | Performed by: FAMILY MEDICINE

## 2019-08-06 PROCEDURE — 36415 COLL VENOUS BLD VENIPUNCTURE: CPT | Performed by: FAMILY MEDICINE

## 2019-08-06 PROCEDURE — 80061 LIPID PANEL: CPT | Performed by: FAMILY MEDICINE

## 2019-08-06 PROCEDURE — 99214 OFFICE O/P EST MOD 30 MIN: CPT | Performed by: FAMILY MEDICINE

## 2019-08-06 PROCEDURE — 80048 BASIC METABOLIC PNL TOTAL CA: CPT | Performed by: FAMILY MEDICINE

## 2019-08-06 ASSESSMENT — MIFFLIN-ST. JEOR: SCORE: 1508.5

## 2019-08-06 NOTE — PROGRESS NOTES
[facility]  [address] [address] [address]  [phone]  Dept: [phone]    PRE-OP EVALUATION:  Today's date: 2019    Kenya Rubalcava (: 1960) presents for pre-operative evaluation assessment as requested by Dr. James. She requires evaluation and anesthesia risk assessment prior to undergoing surgery/procedure for treatment of trigger finger .    Proposed Surgery/ Procedure: Trigger finger release left middle finger   Date of Surgery/ Procedure: 2019  Time of Surgery/ Procedure: to be determined   Hospital/Surgical Facility: [facility]   Fax number for surgical facility: [phone]  Primary Physician: Carlie Real  Type of Anesthesia Anticipated: Local- reported by patient.     Patient has a Health Care Directive or Living Will:  NO    1. NO - Do you have a history of heart attack, stroke, stent, bypass or surgery on an artery in the head, neck, heart or legs?  2. NO - Do you ever have any pain or discomfort in your chest?  3. NO - Do you have a history of  Heart Failure?  4. NO - Are you troubled by shortness of breath when: walking on the level, up a slight hill or at night?  5. NO - Do you currently have a cold, bronchitis or other respiratory infection?  6. NO - Do you have a cough, shortness of breath or wheezing?  7. NO - Do you sometimes get pains in the calves of your legs when you walk?  8. NO - Do you or anyone in your family have previous history of blood clots?  9. NO - Do you or does anyone in your family have a serious bleeding problem such as prolonged bleeding following surgeries or cuts?  10. NO - Have you ever had problems with anemia or been told to take iron pills?  11. NO - Have you had any abnormal blood loss such as black, tarry or bloody stools, or abnormal vaginal bleeding?  12. NO - Have you ever had a blood transfusion?  13. NO - Have you or any of your relatives ever had problems with anesthesia?  14. NO - Do you have  sleep apnea, excessive snoring or daytime drowsiness?  15. NO - Do you have any prosthetic heart valves?  16. NO - Do you have prosthetic joints?  17. NO - Is there any chance that you may be pregnant?      HPI:     HPI related to upcoming procedure: trigger finger - left middle finger       See problem list for active medical problems.  Problems all longstanding and stable, except as noted/documented.  See ROS for pertinent symptoms related to these conditions.      MEDICAL HISTORY:     Patient Active Problem List    Diagnosis Date Noted     Obesity (BMI 35.0-39.9) with comorbidity (H) 07/19/2019     Priority: Medium     Primary insomnia 07/19/2019     Priority: Medium     Anxiety 05/14/2015     Priority: Medium     Meniere disease 11/11/2010     Priority: Medium     Diagnosed 10/10 - active in left ear. Trying dyazide daily, serax twice daily, meclizine qid        Kidney stones 06/15/2009     Priority: Medium     Bilateral noted on CT 5/23/09       Hypertriglyceridemia 06/15/2009     Priority: Medium     Hyperlipidemia LDL goal <130 06/30/2008     Priority: Medium     Recent Labs   Lab Test 11/26/10 0847 7/1/10 0927     CHOL 188 164     HDL 28* 27*     LDL Cannot estimate LDL when triglyceride exceeds 400 mg/dL93 Cannot estimate LDL when triglyceride exceeds 400 mg/dL     TRIG 443* 431*     CHOLHDLRATIO 7.0* 6.0*     11/29/10 - Just added fenofibrate and recheck in 2 months lipid/ast        Psoriasis 05/22/2008     Priority: Medium     Less than 1% body surface area - 1% ointment Triamcinalone, scalp Head&Shoulders or Nizoral Long-Smoothe FS prescriptions.       Symptomatic menopausal or female climacteric states 06/25/2007     Priority: Medium     Peripheral vertigo 09/06/2006     Priority: Medium     Problem list name updated by automated process. Provider to review       Essential hypertension, benign 05/02/2006     Priority: Medium     Anxiety disorder due to medical condition      Priority: Medium      "Treatment tried:  Buspar 7.5 mg bid (stopped 03/21/2005), Celexa started 03/21/2005    Problem list name updated by automated process. Provider to review       Allergic rhinitis 06/16/2004     Priority: Medium     Environmental allergies  Problem list name updated by automated process. Provider to review       Sensorineural hearing loss 07/05/2002     Priority: Medium     Metal \"Esteem\" implant in the right posterior head  - placed approximately 2011    Referred to Dr Street, ENT Yefri Honeycutt by previous clinic  Problem list name updated by automated process. Provider to review          Past Medical History:   Diagnosis Date     Abnormal Papanicolaou smear of cervix and cervical HPV 1986     Anxiety      Anxiety disorder in conditions classified elsewhere      Calculus of kidney 08/11/1988    Calcium renal stone, oxylate     Calculus of ureter 06/30/1985    LEFT urteral stone     Excessive or frequent menstruation 07/07/2003     Labyrinthitis, unspecified      Lump or mass in breast 10/27/1999    RIGHT breast mass     Variants of migraine, not elsewhere classified, without mention of intractable migraine without mention of status migrainosus 02/07/2002     Past Surgical History:   Procedure Laterality Date     CARPAL TUNNEL RELEASE RT/LT  08/2008    Left      COLONOSCOPY  8/7/2012    Procedure: COLONOSCOPY;  Colonoscopy;  Surgeon: Kailyn Lopez MD;  Location: [facility]     CYSTOSCOPY, RETROGRADES, INSERT STENT URETER(S), COMBINED Left 8/19/2016    Procedure: COMBINED CYSTOSCOPY, RETROGRADES, INSERT STENT URETER(S);  Surgeon: Terence Carpenter MD;  [facility]     esteem implant  06/23/2011    Hearing implant right ear.     HYSTERECTOMY, PAP NO LONGER INDICATED  12/2004    Vaginal hysterectomy     LAPAROSCOPIC ABLATION ENDOMETRIOSIS  07/2004     LASER HOLMIUM LITHOTRIPSY URETER(S), INSERT STENT, COMBINED Left 9/12/2016    Procedure: COMBINED CYSTOSCOPY, URETEROSCOPY, LASER HOLMIUM LITHOTRIPSY " URETER(S), INSERT STENT;  Surgeon: Kailyn Dozier MD;  [facility]     LEFT wrist surgery       Sinus surgery with repair of deviated septum       TUBAL LIGATION       Current Outpatient Medications   Medication Sig Dispense Refill     atenolol (TENORMIN) 50 MG tablet Take 1 tablet (50 mg) by mouth daily 90 tablet 3     Fluocinolone Acetonide Scalp 0.01 % OIL oil Apply topically 2 times daily as needed 120 mL 3     pravastatin (PRAVACHOL) 40 MG tablet TAKE ONE TABLET BY MOUTH EVERY DAY AT BEDTIME 90 tablet 3     triamterene-HCTZ (DYAZIDE) 37.5-25 MG capsule Take 1 capsule by mouth every morning 90 capsule 3     venlafaxine (EFFEXOR-XR) 150 MG 24 hr capsule Take 1 capsule (150 mg) by mouth daily 90 capsule 3     ASPIRIN 325 MG OR TBEC one tablet by mouth daily (buffered)       betamethasone dipropionate (DIPROSONE) 0.05 % external ointment Apply sparingly to affected area twice daily as needed.  Do not apply to face. 45 g 1     diazepam (VALIUM) 5 MG tablet Take 1 tablet (5 mg) by mouth every 12 hours as needed for anxiety or sleep 30 tablet 0     DIPHENHYDRAMINE HCL PO Take 25 mg by mouth daily as needed (for Menieres disease)       fluticasone (FLONASE) 50 MCG/ACT nasal spray Spray 1-2 sprays into both nostrils daily 3 Package 1     meclizine (ANTIVERT) 25 MG tablet Take 2 tablets by mouth 2 times daily as needed. 60 tablet 0     order for DME Equipment being ordered: heel cups 1 Device 0     prochlorperazine (COMPAZINE) 10 MG tablet Take 0.5 tablets (5 mg) by mouth every 6 hours as needed for nausea or vomiting 10 tablet 1     traZODone (DESYREL) 150 MG tablet Take 1 tablet (150 mg) by mouth At Bedtime 90 tablet 3     OTC products: no recent use of OTC ASA, NSAIDS or Steroids and no use of herbal medications or other supplements    Allergies   Allergen Reactions     Ivp Dye [Contrast Dye] Anaphylaxis and Hives     Cats Anaphylaxis     Niaspan [Niacin] Rash     Pollen Extract/Tree Extract       Latex  "Allergy: NO    Social History     Tobacco Use     Smoking status: Never Smoker     Smokeless tobacco: Never Used   Substance Use Topics     Alcohol use: Yes     Alcohol/week: 0.0 oz     Comment: rare      History   Drug Use No       REVIEW OF SYSTEMS:   Constitutional, neuro, ENT, endocrine, pulmonary, cardiac, gastrointestinal, genitourinary, musculoskeletal, integument and psychiatric systems are negative, except as otherwise noted.    EXAM:   /66 (BP Location: Left arm, Patient Position: Sitting, Cuff Size: Adult Large)   Pulse 67   Temp 97.8  F (36.6  C) (Tympanic)   Resp 14   Ht 1.6 m (5' 3\")   Wt 95.4 kg (210 lb 6.4 oz)   BMI 37.27 kg/m      GENERAL APPEARANCE: healthy, alert and no distress     EYES: EOMI, PERRL     HENT: ear canals and TM's normal and nose and mouth without ulcers or lesions     NECK: no adenopathy, no asymmetry, masses, or scars and thyroid normal to palpation     RESP: lungs clear to auscultation - no rales, rhonchi or wheezes     CV: regular rates and rhythm, normal S1 S2, no S3 or S4 and no murmur, click or rub     ABDOMEN:  soft, nontender, no HSM or masses and bowel sounds normal     MS: extremities normal- no gross deformities noted, no evidence of inflammation in joints, FROM in all extremities.     SKIN: no suspicious lesions or rashes     NEURO: Normal strength and tone, sensory exam grossly normal, mentation intact and speech normal     PSYCH: mentation appears normal. and affect normal/bright     LYMPHATICS: No cervical adenopathy    DIAGNOSTICS:   EKG: Not indicated due to non-vascular surgery and low risk of event (age <65 and without cardiac risk factors)    Results for orders placed or performed in visit on 08/06/19   **Basic metabolic panel FUTURE 1yr   Result Value Ref Range    Sodium 138 133 - 144 mmol/L    Potassium 3.6 3.4 - 5.3 mmol/L    Chloride 103 94 - 109 mmol/L    Carbon Dioxide 29 20 - 32 mmol/L    Anion Gap 6 3 - 14 mmol/L    Glucose 106 (H) 70 - 99 " mg/dL    Urea Nitrogen 17 7 - 30 mg/dL    Creatinine 0.74 0.52 - 1.04 mg/dL    GFR Estimate 89 >60 mL/min/[1.73_m2]    GFR Estimate If Black >90 >60 mL/min/[1.73_m2]    Calcium 9.4 8.5 - 10.1 mg/dL   Lipid panel reflex to direct LDL Fasting   Result Value Ref Range    Cholesterol 160 <200 mg/dL    Triglycerides 202 (H) <150 mg/dL    HDL Cholesterol 35 (L) >49 mg/dL    LDL Cholesterol Calculated 85 <100 mg/dL    Non HDL Cholesterol 125 <130 mg/dL   CBC with platelets   Result Value Ref Range    WBC 5.3 4.0 - 11.0 10e9/L    RBC Count 4.39 3.8 - 5.2 10e12/L    Hemoglobin 13.7 11.7 - 15.7 g/dL    Hematocrit 40.1 35.0 - 47.0 %    MCV 91 78 - 100 fl    MCH 31.2 26.5 - 33.0 pg    MCHC 34.2 31.5 - 36.5 g/dL    RDW 12.9 10.0 - 15.0 %    Platelet Count 174 150 - 450 10e9/L         Recent Labs   Lab Test 09/27/18  1316 12/14/17  0905  08/19/16  0501  06/19/13  0830   HGB 14.6  --   --  14.3   < >  --      --   --  191   < >  --     137   < > 138   < > 144   POTASSIUM 3.7 3.6   < > 3.3*   < > 5.0   CR 0.79 0.72   < > 1.00   < > 0.80   A1C  --   --   --   --   --  5.6    < > = values in this interval not displayed.        IMPRESSION:   Reason for surgery/procedure: trigger finger .    Proposed Surgery/ Procedure: Trigger finger release left middle finger       Diagnosis/reason for consult:   Pre op consultation   Trigger finger, left middle  Hypertension   Hyperlipidemia  Hearing loss, has metal implant in right posterior head  Anxiety   Allergic rhinitis  Insomnia  Body mass index is 37.27 kg/m .        The proposed surgical procedure is considered INTERMEDIATE risk.    REVISED CARDIAC RISK INDEX  The patient has the following serious cardiovascular risks for perioperative complications such as (MI, PE, VFib and 3  AV Block):  No serious cardiac risks  INTERPRETATION: 0 risks: Class I (very low risk - 0.4% complication rate)    The patient has the following additional risks for perioperative complications:  No  identified additional risks      RECOMMENDATIONS:     --Patient is to take all scheduled medications on the day of surgery   She already stopped her aspirin.     APPROVAL GIVEN to proceed with proposed procedure, without further diagnostic evaluation       Signed Electronically by: Briseyda Garcia MD    Copy of this evaluation report is provided to requesting physician.    [facility] Preop Guidelines    Revised Cardiac Risk Index

## 2019-08-20 ENCOUNTER — TRANSFERRED RECORDS (OUTPATIENT)
Dept: HEALTH INFORMATION MANAGEMENT | Facility: CLINIC | Age: 59
End: 2019-08-20

## 2019-10-19 ENCOUNTER — APPOINTMENT (OUTPATIENT)
Dept: GENERAL RADIOLOGY | Facility: CLINIC | Age: 59
End: 2019-10-19
Attending: PHYSICIAN ASSISTANT
Payer: COMMERCIAL

## 2019-10-19 ENCOUNTER — HOSPITAL ENCOUNTER (EMERGENCY)
Facility: CLINIC | Age: 59
Discharge: HOME | End: 2019-10-19
Attending: PHYSICIAN ASSISTANT | Admitting: PHYSICIAN ASSISTANT
Payer: COMMERCIAL

## 2019-10-19 VITALS
SYSTOLIC BLOOD PRESSURE: 126 MMHG | TEMPERATURE: 98.3 F | BODY MASS INDEX: 37.27 KG/M2 | HEART RATE: 68 BPM | DIASTOLIC BLOOD PRESSURE: 80 MMHG | RESPIRATION RATE: 16 BRPM | OXYGEN SATURATION: 97 % | HEIGHT: 63 IN

## 2019-10-19 DIAGNOSIS — S51.852A DOG BITE OF LEFT FOREARM, INITIAL ENCOUNTER: ICD-10-CM
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
## 2019-10-19 PROCEDURE — 99213 OFFICE O/P EST LOW 20 MIN: CPT | Mod: Z6 | Performed by: PHYSICIAN ASSISTANT

## 2019-10-19 PROCEDURE — G0463 HOSPITAL OUTPT CLINIC VISIT: HCPCS

## 2019-10-19 PROCEDURE — 73090 X-RAY EXAM OF FOREARM: CPT | Mod: LT

## 2019-10-19 NOTE — ED PROVIDER NOTES
History     Chief Complaint   Patient presents with     Dog Bite     HPI  Kenya Rubalcava is a 59 year old female who presents to the urgent care with concern over dog bite to her left forearm which occurred just prior to arrival.  Patient reports that she went to dog and her neighbor's door and his neighbor open the door her great Devin bit her left forearm and pulled her towards the door frame resulting in her chipping her tooth.  She did not have any LOC.  She denies any current headache, dizziness, lightheadedness, nausea, vomiting, abdominal pain.  She does report moderate forearm pain. Low concern for foreign body embedded in the wound.  Her tetanus vaccine is up to date and animal owner stated that there rabies vaccine was current.    Allergies:  Allergies   Allergen Reactions     Ivp Dye [Contrast Dye] Anaphylaxis and Hives     Cats Anaphylaxis     Niaspan [Niacin] Rash     Pollen Extract/Tree Extract        Problem List:    Patient Active Problem List    Diagnosis Date Noted     Obesity (BMI 35.0-39.9) with comorbidity (H) 07/19/2019     Priority: Medium     Primary insomnia 07/19/2019     Priority: Medium     Anxiety 05/14/2015     Priority: Medium     Meniere disease 11/11/2010     Priority: Medium     Diagnosed 10/10 - active in left ear. Trying dyazide daily, serax twice daily, meclizine qid        Kidney stones 06/15/2009     Priority: Medium     Bilateral noted on CT 5/23/09       Hypertriglyceridemia 06/15/2009     Priority: Medium     Hyperlipidemia LDL goal <130 06/30/2008     Priority: Medium     Recent Labs   Lab Test 11/26/10 0847 7/1/10 0927     CHOL 188 164     HDL 28* 27*     LDL Cannot estimate LDL when triglyceride exceeds 400 mg/dL93 Cannot estimate LDL when triglyceride exceeds 400 mg/dL     TRIG 443* 431*     CHOLHDLRATIO 7.0* 6.0*     11/29/10 - Just added fenofibrate and recheck in 2 months lipid/ast        Psoriasis 05/22/2008     Priority: Medium     Less than 1% body surface area  "- 1% ointment Triamcinalone, scalp Head&Shoulders or Nizoral [place]-Smoothe FS prescriptions.       Symptomatic menopausal or female climacteric states 06/25/2007     Priority: Medium     Peripheral vertigo 09/06/2006     Priority: Medium     Problem list name updated by automated process. Provider to review       Essential hypertension, benign 05/02/2006     Priority: Medium     Anxiety disorder due to medical condition      Priority: Medium     Treatment tried:  Buspar 7.5 mg bid (stopped 03/21/2005), Celexa started 03/21/2005    Problem list name updated by automated process. Provider to review       Allergic rhinitis 06/16/2004     Priority: Medium     Environmental allergies  Problem list name updated by automated process. Provider to review       Sensorineural hearing loss 07/05/2002     Priority: Medium     Metal \"Esteem\" implant in the right posterior head  - placed approximately 2011    Referred to Dr Street, ENT [facility] Honeycutt by previous clinic  Problem list name updated by automated process. Provider to review          Past Medical History:    Past Medical History:   Diagnosis Date     Abnormal Papanicolaou smear of cervix and cervical HPV 1986     Anxiety      Anxiety disorder in conditions classified elsewhere      Calculus of kidney 08/11/1988     Calculus of ureter 06/30/1985     Excessive or frequent menstruation 07/07/2003     Labyrinthitis, unspecified      Lump or mass in breast 10/27/1999     Variants of migraine, not elsewhere classified, without mention of intractable migraine without mention of status migrainosus 02/07/2002     Past Surgical History:    Past Surgical History:   Procedure Laterality Date     CARPAL TUNNEL RELEASE RT/LT  08/2008    Left      COLONOSCOPY  8/7/2012    Procedure: COLONOSCOPY;  Colonoscopy;  Surgeon: Kailyn Lopez MD;  Location: [facility]     CYSTOSCOPY, RETROGRADES, INSERT STENT URETER(S), COMBINED Left 8/19/2016    Procedure: COMBINED CYSTOSCOPY, " RETROGRADES, INSERT STENT URETER(S);  Surgeon: Terence Carpenter MD;  [facility]     esteem implant  06/23/2011    Hearing implant right ear.     HYSTERECTOMY, PAP NO LONGER INDICATED  12/2004    Vaginal hysterectomy     LAPAROSCOPIC ABLATION ENDOMETRIOSIS  07/2004     LASER HOLMIUM LITHOTRIPSY URETER(S), INSERT STENT, COMBINED Left 9/12/2016    Procedure: COMBINED CYSTOSCOPY, URETEROSCOPY, LASER HOLMIUM LITHOTRIPSY URETER(S), INSERT STENT;  Surgeon: Kailyn Dozier MD;  [facility]     LEFT wrist surgery       Sinus surgery with repair of deviated septum       TUBAL LIGATION       Family History:    Family History   Problem Relation Age of Onset     Heart Disease Brother      Hypertension Brother      Asthma Daughter      Heart Disease Brother      Hypertension Brother      C.A.D. Father         MI      Hypertension Father      Lipids Father 50     C.A.D. Paternal Aunt         MI-50s     Cancer Maternal Aunt         Ovarian CA     Cerebrovascular Disease No family hx of      Breast Cancer No family hx of      Cancer - colorectal No family hx of      Prostate Cancer No family hx of      Social History:  Marital Status:  Single [1]  Social History     Tobacco Use     Smoking status: Never Smoker     Smokeless tobacco: Never Used   Substance Use Topics     Alcohol use: Yes     Alcohol/week: 0.0 standard drinks     Comment: rare      Drug use: No      Medications:    ASPIRIN 325 MG OR TBEC  atenolol (TENORMIN) 50 MG tablet  betamethasone dipropionate (DIPROSONE) 0.05 % external ointment  diazepam (VALIUM) 5 MG tablet  DIPHENHYDRAMINE HCL PO  Fluocinolone Acetonide Scalp 0.01 % OIL oil  fluticasone (FLONASE) 50 MCG/ACT nasal spray  meclizine (ANTIVERT) 25 MG tablet  order for DME  pravastatin (PRAVACHOL) 40 MG tablet  prochlorperazine (COMPAZINE) 10 MG tablet  traZODone (DESYREL) 150 MG tablet  triamterene-HCTZ (DYAZIDE) 37.5-25 MG capsule  venlafaxine (EFFEXOR-XR) 150 MG 24 hr  "capsule      Review of Systems  CONSTITUTIONAL:NEGATIVE for fever, chills, change in weight  INTEGUMENTARY/SKIN: POSITIVE for ecchymosis, dog bite to left forearm   HEENT:  POSITIVE for chipped tooth NEGATIVE for sore throat, nasal congestion, ear pain, dental laxity   RESP:NEGATIVE for significant cough or SOB  MUSCULOSKELETAL: POSITIVE  for left forearm pain and NEGATIVE for other concerning new onset arthralgias or myalgias   NEURO: NEGATIVE for numbness, paresthesias   Physical Exam   BP: 126/80  Pulse: 68  Temp: 98.3  F (36.8  C)  Resp: 16  Height: 160 cm (5' 3\")  SpO2: 97 %  Physical Exam  GENERAL APPEARANCE: healthy, alert and no distress  EYES: EOMI,  PERRL, conjunctiva clear  HENT: ear canals and TM's normal.  Posterior pharynx is nonerythematous without exudate  NECK: supple, nontender, no lymphadenopathy  RESP: lungs clear to auscultation - no rales, rhonchi or wheezes  CV: regular rates and rhythm, normal S1 S2, no murmur noted  NEURO: Normal strength and tone, sensory exam grossly normal,  normal speech and mentation  SKIN: 0.5 cm puncture wound to the dorsal surface of the distal right forearm.  There is a 0.2 cm which puncture wound to the volar surface of the distal right forearm has 1-2 cm of underlying ecchymosis both of these puncture wounds are surrounded by semicircular areas of ecchymosis/erythema   MS: Full range of motion of the left elbow, left wrist, left hand.  moderate tenderness palpation overlying the site of animal bite on the distal forearm, no bony tenderness to palpation.  ED Course        Procedures        Critical Care time:  none        Results for orders placed or performed during the hospital encounter of 10/19/19   Radius/Ulna XR,  PA &LAT, left    Narrative    FOREARM LEFT TWO VIEW   10/19/2019 4:58 PM     HISTORY:  Pain after dog bite, rule out bony abnormality.      Impression    IMPRESSION: The bones appear normal. No evidence of fracture or  osteomyelitis. No foreign body " or soft tissue gas evident. Mild  degenerative changes in the wrist. Otherwise unremarkable.    RYAN LAMAR MD     Medications - No data to display    Assessments & Plan (with Medical Decision Making)     I have reviewed the nursing notes.    I have reviewed the findings, diagnosis, plan and need for follow up with the patient.       Discharge Medication List as of 10/19/2019  5:06 PM      START taking these medications    Details   amoxicillin-clavulanate (AUGMENTIN) 875-125 MG tablet Take 1 tablet by mouth 2 times daily for 5 days, Disp-10 tablet, R-0, Local Print             Final diagnoses:   Dog bite of left forearm, initial encounter     51-year-old female presents the urgent care with concern over dog bite to her left forearm which occurred just prior to arrival.  She had stable vital signs upon arrival.  Physical exam findings as noted above were consistent with animal bite.  Patient did have x-ray performed which was negative for acute fracture, dislocation.  Did discuss her/benefits of empiric antibiotics given mechanism of injury and patient elected to initiate Augmentin 875-125 twice daily for 5 days.  Wound was cleaned with Hibiclens, saline. Law enforcement was notified of animal bite by LPN prior to discharge.   Wound care instructions, signs of infection, worrisome reasons to return to the ER/UC discussed.  Follow-up as needed.      Disclaimer: This note consists of symbols derived from keyboarding, dictation, and/or voice recognition software. As a result, there may be errors in the script that have gone undetected.  Please consider this when interpreting information found in the chart.    10/19/2019   [facility] EMERGENCY DEPARTMENT     Dolores Gerber PA-C  10/20/19 4071

## 2019-10-19 NOTE — ED AVS SNAPSHOT
[facility]  [address] [address]  Phone:  [phone]  Fax:  [phone]                                    Kenya Rubalcava   MRN: [id number]    Department:  [facility]   Date of Visit:  10/19/2019           After Visit Summary Signature Page    I have received my discharge instructions, and my questions have been answered. I have discussed any challenges I see with this plan with the nurse or doctor.    ..........................................................................................................................................  Patient/Patient Representative Signature      ..........................................................................................................................................  Patient Representative Print Name and Relationship to Patient    ..................................................               ................................................  Date                                   Time    ..........................................................................................................................................  Reviewed by Signature/Title    ...................................................              ..............................................  Date                                               Time          22EPIC Rev 08/18

## 2019-11-04 ENCOUNTER — HEALTH MAINTENANCE LETTER (OUTPATIENT)
Age: 59
End: 2019-11-04

## 2019-11-09 DIAGNOSIS — F06.4 ANXIETY DISORDER DUE TO MEDICAL CONDITION: ICD-10-CM

## 2019-11-09 DIAGNOSIS — F41.9 ANXIETY: ICD-10-CM

## 2019-11-11 NOTE — TELEPHONE ENCOUNTER
Routing refill request to provider for review/approval because:  Drug not on the [facility] refill protocol   Mrak Cosme RN

## 2019-11-11 NOTE — TELEPHONE ENCOUNTER
diazePAM Oral Tablet 5 MG      Last Written Prescription Date:  7/15/19  Last Fill Quantity: 30,   # refills: 0  Last Office Visit: 8/6/19  Future Office visit:       Routing refill request to provider for review/approval because:  Drug not on the [facility], [facility] or [facility] refill protocol or controlled substance

## 2019-11-12 RX ORDER — DIAZEPAM 5 MG
TABLET ORAL
Qty: 60 TABLET | Refills: 0 | Status: SHIPPED | OUTPATIENT
Start: 2019-11-12 | End: 2020-03-19

## 2020-06-05 ENCOUNTER — TELEPHONE (OUTPATIENT)
Dept: FAMILY MEDICINE | Facility: CLINIC | Age: 60
End: 2020-06-05

## 2020-06-05 NOTE — TELEPHONE ENCOUNTER
PA Initiation    Medication: pravastatin (PRAVACHOL) 40 MG tablet -   Insurance Company:    Pharmacy Filling the Rx: [id number] - [place], [place] - [facility]  Filling Pharmacy Phone: [phone]  Filling Pharmacy Fax: [phone]  Start Date: 6/5/2020

## 2020-06-05 NOTE — TELEPHONE ENCOUNTER
Prior Authorization Not Needed per Insurance    Medication: pravastatin (PRAVACHOL) 40 MG tablet - NOT NEEDED  Insurance Company:    Expected CoPay:      Pharmacy Filling the Rx: [facility][id number] - [place], [place] - [address]  Pharmacy Notified: YesComment:  [facility] states went through insurance just fine.  Patient Notified: Comment:  **Instructed pharmacy to notify patient when script is ready to /ship.**

## 2020-06-05 NOTE — TELEPHONE ENCOUNTER
Prior Authorization Retail Medication Request    Medication/Dose: pravastatin (PRAVACHOL) 40 MG tablet   ICD code (if different than what is on RX):  Hyperlipidemia LDL goal <130 (E78.5)   Previously Tried and Failed:    Rationale:      Insurance Name:  [facility]   Insurance ID:  [id number]      Pharmacy Information (if different than what is on RX)  Name:  [id number] - [place], [place] - [facility]   Phone: [phone]

## 2020-07-08 ENCOUNTER — VIRTUAL VISIT (OUTPATIENT)
Dept: FAMILY MEDICINE | Facility: OTHER | Age: 60
End: 2020-07-08

## 2020-07-08 NOTE — PROGRESS NOTES
"Date: 2020 09:25:07  Clinician: Shiela Olvera  Clinician NPI: [id number]  Patient: Kenya Rubalcava  Patient : 1960  Patient Address: [address] [place] [address]  Patient Phone: [phone]  Visit Protocol: URI  Patient Summary:  Kenya is a 59 year old ( : 1960 ) female who initiated a Visit for COVID-19 (Coronavirus) evaluation and screening. When asked the question \"Please sign me up to receive news, health information and promotions from [facility].\", Kenya responded \"No\".    Kenya states her symptoms started today.   Her symptoms consist of malaise, a headache, a sore throat, myalgia, and a cough. She is experiencing mild difficulty breathing with activities but can speak normally in full sentences. Kenya also feels feverish.   Symptom details     Cough: Kenya coughs a few times an hour and her cough is more bothersome at night. Phlegm does not come into her throat when she coughs. She does not believe her cough is caused by post-nasal drip.     Sore throat: Kenya reports having mild throat pain (1-3 on a 10 point pain scale), does not have exudate on her tonsils, and can swallow liquids. The lymph nodes in her neck are not enlarged. A rash has not appeared on the skin since the sore throat started.     Temperature: Her current temperature is 99.9 degrees Fahrenheit.     Headache: She states the headache is mild (1-3 on a 10 point pain scale).      Kenya denies having wheezing, nausea, teeth pain, ageusia, diarrhea, vomiting, rhinitis, ear pain, chills, enlarged lymph nodes, anosmia, facial pain or pressure, and nasal congestion. She also denies having recent facial or sinus surgery in the past 60 days, taking antibiotic medication in the past month, and having a sinus infection within the past year.   Precipitating events  Kenya is not sure if she has been exposed to someone with strep throat. She has not recently been exposed to someone with influenza. " Kenya has been in close contact with the following high risk individuals: children under the age of 5 and people with asthma, heart disease or diabetes.   Pertinent COVID-19 (Coronavirus) information  In the past 14 days, Kenya has not worked in a congregate living setting.   She does not work or volunteer as healthcare worker or a  and does not work or volunteer in a healthcare facility.   Kenya also has not lived in a congregate living setting in the past 14 days. She does not live with a healthcare worker.   Kenya has not had a close contact with a laboratory-confirmed COVID-19 patient within 14 days of symptom onset.   Pertinent medical history  Kenya typically gets a yeast infection when she takes antibiotics. She is not sure if she has used fluconazole (Diflucan) to treat previous yeast infections.   Kenya does not need a return to work/school note.   Weight: 195 lbs   Kenya does not smoke or use smokeless tobacco.   Additional information as reported by the patient (free text): Temp usually is 96 or 97   Weight: 195 lbs    MEDICATIONS: trazodone oral, pravastatin oral, Effexor XR oral, triamterene-hydrochlorothiazide oral, atenolol-chlorthalidone oral, ALLERGIES: NKDA  Clinician Response:  Dear Kenya,   Your symptoms show that you may have coronavirus (COVID-19). This illness can cause fever, cough and trouble breathing. Many people get a mild case and get better on their own. Some people can get very sick.  What should I do?  We would like to test you for this virus.   1. Please call [phone] to schedule your visit. Explain that you were [facility] to have a COVID-19 test. Be ready to share your [facility] visit ID number.  The following will serve as your written order for this COVID Test, ordered by me, for the indication of suspected COVID [Z20.828]: The test will be ordered in [place], our electronic health record, after you are scheduled. It will show as ordered and  "authorized by Mookie Gomez MD.  Order: COVID-19 (Coronavirus) PCR for SYMPTOMATIC testing from [facility].      2. When it's time for your COVID test:  Stay at least 6 feet away from others. (If someone will drive you to your test, stay in the backseat, as far away from the  as you can.)   Cover your mouth and nose with a mask, tissue or washcloth.  Go straight to the testing site. Don't make any stops on the way there or back.      3.Starting now: Stay home and away from others (self-isolate) until:   You've had no fever---and no medicine that reduces fever---for 3 full days (72 hours). And...   Your other symptoms have gotten better. For example, your cough or breathing has improved. And...   At least 10 days have passed since your symptoms started.       During this time, don't leave the house except for testing or medical care.   Stay in your own room, even for meals. Use your own bathroom if you can.   Stay away from others in your home. No hugging, kissing or shaking hands. No visitors.  Don't go to work, school or anywhere else.    Clean \"high touch\" surfaces often (doorknobs, counters, handles, etc.). Use a household cleaning spray or wipes. You'll find a full list of  on the EPA website: www.epa.gov/pesticide-registration/list-n-disinfectants-use-against-sars-cov-2.   Cover your mouth and nose with a mask, tissue or washcloth to avoid spreading germs.  Wash your hands and face often. Use soap and water.  Caregivers in these groups are at risk for severe illness due to COVID-19:  o People 65 years and older  o People who live in a nursing home or long-term care facility  o People with chronic disease (lung, heart, cancer, diabetes, kidney, liver, immunologic)  o People who have a weakened immune system, including those who:   Are in cancer treatment  Take medicine that weakens the immune system, such as corticosteroids  Had a bone marrow or organ transplant  Have an immune deficiency  Have poorly " controlled HIV or AIDS  Are obese (body mass index of 40 or higher)  Smoke regularly   o Caregivers should wear gloves while washing dishes, handling laundry and cleaning bedrooms and bathrooms.  o Use caution when washing and drying laundry: Don't shake dirty laundry, and use the warmest water setting that you can.  o For more tips, go to www.cdc.gov/coronavirus/2019-ncov/downloads/10Things.pdf.       How can I take care of myself?   Get lots of rest. Drink extra fluids (unless a doctor has told you not to).   Take Tylenol (acetaminophen) for fever or pain. If you have liver or kidney problems, ask your family doctor if it's okay to take Tylenol.   Adults can take either:    650 mg (two 325 mg pills) every 4 to 6 hours, or...   1,000 mg (two 500 mg pills) every 8 hours as needed.    Note: Don't take more than 3,000 mg in one day. Acetaminophen is found in many medicines (both prescribed and over-the-counter medicines). Read all labels to be sure you don't take too much.   For children, check the Tylenol bottle for the right dose. The dose is based on the child's age or weight.    If you have other health problems (like cancer, heart failure, an organ transplant or severe kidney disease): Call your specialty clinic if you don't feel better in the next 2 days.       Know when to call 911. Emergency warning signs include:    Trouble breathing or shortness of breath Pain or pressure in the chest that doesn't go away Feeling confused like you haven't felt before, or not being able to wake up Bluish-colored lips or face.  Where can I get more information?    [facility] -- About COVID-19: [email or web address]   CDC -- What to Do If You're Sick: www.cdc.gov/coronavirus/2019-ncov/about/steps-when-sick.html   CDC -- Ending Home Isolation: www.cdc.gov/coronavirus/2019-ncov/hcp/disposition-in-home-patients.html   CDC -- Caring for Someone:  www.cdc.gov/coronavirus/2019-ncov/if-you-are-sick/care-for-someone.html   [facility] -- Interim Guidance for Hospital Discharge to Home: www.health.[facility].[place]/diseases/coronavirus/hcp/hospdischarge.pdf   [facility] clinical trials (COVID-19 research studies): clinicalaffairs.[facility]-clinical-trials    Below are the COVID-19 hotlines at the [place] Department of Health ([facility]). Interpreters are available.    For health questions: Call 053-442-1273 or 1-861.625.9986 (7 a.m. to 7 p.m.) For questions about schools and childcare: Call 578-493-3384 or 1-272.913.9886 (7 a.m. to 7 p.m.)    Diagnosis: Cough  Diagnosis ICD: R05

## 2020-07-09 DIAGNOSIS — Z20.822 ENCOUNTER FOR LABORATORY TESTING FOR COVID-19 VIRUS: Primary | ICD-10-CM

## 2020-07-09 LAB
SARS-COV-2 RNA SPEC QL NAA+PROBE: NOT DETECTED
SPECIMEN SOURCE: NORMAL

## 2020-07-09 PROCEDURE — 99207 ZZC NO CHARGE LOS: CPT

## 2020-07-09 PROCEDURE — U0003 INFECTIOUS AGENT DETECTION BY NUCLEIC ACID (DNA OR RNA); SEVERE ACUTE RESPIRATORY SYNDROME CORONAVIRUS 2 (SARS-COV-2) (CORONAVIRUS DISEASE [COVID-19]), AMPLIFIED PROBE TECHNIQUE, MAKING USE OF HIGH THROUGHPUT TECHNOLOGIES AS DESCRIBED BY CMS-2020-01-R: HCPCS | Performed by: FAMILY MEDICINE

## 2020-07-09 NOTE — LETTER
July 14, 2020        Kenya Rubalcava  [address] [place] [address]    This letter provides a written record that you were tested for COVID-19 on  7/9/20.      Your result was negative. This means that we didn t find the virus that causes COVID-19 in your sample. A test may show negative when you do actually have the virus. This can happen when the virus is in the early stages of infection, before you feel illness symptoms.    If you have symptoms   Stay home and away from others (self-isolate) until you meet ALL of the guidelines below:    You ve had no fever--and no medicine that reduces fever--for 3 full days (72 hours). And      Your other symptoms have gotten better. For example, your cough or breathing has improved. And     At least 10 days have passed since your symptoms started.    During this time:    Stay home. Don t go to work, school or anywhere else.     Stay in your own room, including for meals. Use your own bathroom if you can.    Stay away from others in your home. No hugging, kissing or shaking hands. No visitors.    Clean  high touch  surfaces often (doorknobs, counters, handles, etc.). Use a household cleaning spray or wipes. You can find a full list on the EPA website at www.epa.gov/pesticide-registration/list-n-disinfectants-use-against-sars-cov-2.    Cover your mouth and nose with a mask, tissue or washcloth to avoid spreading germs.    Wash your hands and face often with soap and water.    Going back to work  Check with your employer for any guidelines to follow for going back to work.    Employers: This document serves as formal notice that your employee tested negative for COVID-19, as of the testing date shown above.

## 2020-07-19 DIAGNOSIS — F51.01 PRIMARY INSOMNIA: ICD-10-CM

## 2020-07-20 RX ORDER — TRAZODONE HYDROCHLORIDE 150 MG/1
150 TABLET ORAL AT BEDTIME
Qty: 90 TABLET | Refills: 0 | Status: SHIPPED | OUTPATIENT
Start: 2020-07-20 | End: 2020-10-08

## 2020-07-29 ENCOUNTER — ANCILLARY PROCEDURE (OUTPATIENT)
Dept: GENERAL RADIOLOGY | Facility: CLINIC | Age: 60
End: 2020-07-29
Attending: FAMILY MEDICINE
Payer: COMMERCIAL

## 2020-07-29 ENCOUNTER — TELEPHONE (OUTPATIENT)
Dept: FAMILY MEDICINE | Facility: CLINIC | Age: 60
End: 2020-07-29

## 2020-07-29 ENCOUNTER — OFFICE VISIT (OUTPATIENT)
Dept: FAMILY MEDICINE | Facility: CLINIC | Age: 60
End: 2020-07-29
Payer: COMMERCIAL

## 2020-07-29 VITALS
BODY MASS INDEX: 37.74 KG/M2 | DIASTOLIC BLOOD PRESSURE: 71 MMHG | TEMPERATURE: 97.1 F | HEART RATE: 63 BPM | OXYGEN SATURATION: 100 % | WEIGHT: 213 LBS | HEIGHT: 63 IN | SYSTOLIC BLOOD PRESSURE: 105 MMHG

## 2020-07-29 DIAGNOSIS — F06.4 ANXIETY DISORDER DUE TO MEDICAL CONDITION: ICD-10-CM

## 2020-07-29 DIAGNOSIS — S69.92XA HAND INJURY, LEFT, INITIAL ENCOUNTER: ICD-10-CM

## 2020-07-29 DIAGNOSIS — Z00.00 ROUTINE GENERAL MEDICAL EXAMINATION AT A HEALTH CARE FACILITY: Primary | ICD-10-CM

## 2020-07-29 DIAGNOSIS — L40.9 PSORIASIS: ICD-10-CM

## 2020-07-29 DIAGNOSIS — I10 ESSENTIAL HYPERTENSION, BENIGN: ICD-10-CM

## 2020-07-29 DIAGNOSIS — E78.5 HYPERLIPIDEMIA LDL GOAL <130: ICD-10-CM

## 2020-07-29 DIAGNOSIS — F41.9 ANXIETY: ICD-10-CM

## 2020-07-29 DIAGNOSIS — N62 HYPERTROPHY OF BREAST: ICD-10-CM

## 2020-07-29 LAB
ALT SERPL W P-5'-P-CCNC: 41 U/L (ref 0–50)
ANION GAP SERPL CALCULATED.3IONS-SCNC: 6 MMOL/L (ref 3–14)
BUN SERPL-MCNC: 19 MG/DL (ref 7–30)
CALCIUM SERPL-MCNC: 9.3 MG/DL (ref 8.5–10.1)
CHLORIDE SERPL-SCNC: 105 MMOL/L (ref 94–109)
CHOLEST SERPL-MCNC: 166 MG/DL
CO2 SERPL-SCNC: 28 MMOL/L (ref 20–32)
CREAT SERPL-MCNC: 0.82 MG/DL (ref 0.52–1.04)
GFR SERPL CREATININE-BSD FRML MDRD: 79 ML/MIN/{1.73_M2}
GLUCOSE SERPL-MCNC: 97 MG/DL (ref 70–99)
HDLC SERPL-MCNC: 34 MG/DL
LDLC SERPL CALC-MCNC: 95 MG/DL
NONHDLC SERPL-MCNC: 132 MG/DL
POTASSIUM SERPL-SCNC: 3.9 MMOL/L (ref 3.4–5.3)
SODIUM SERPL-SCNC: 139 MMOL/L (ref 133–144)
TRIGL SERPL-MCNC: 185 MG/DL

## 2020-07-29 PROCEDURE — 80061 LIPID PANEL: CPT | Performed by: FAMILY MEDICINE

## 2020-07-29 PROCEDURE — 73110 X-RAY EXAM OF WRIST: CPT | Mod: LT

## 2020-07-29 PROCEDURE — 36415 COLL VENOUS BLD VENIPUNCTURE: CPT | Performed by: FAMILY MEDICINE

## 2020-07-29 PROCEDURE — 84460 ALANINE AMINO (ALT) (SGPT): CPT | Performed by: FAMILY MEDICINE

## 2020-07-29 PROCEDURE — 99396 PREV VISIT EST AGE 40-64: CPT | Performed by: FAMILY MEDICINE

## 2020-07-29 PROCEDURE — 80048 BASIC METABOLIC PNL TOTAL CA: CPT | Performed by: FAMILY MEDICINE

## 2020-07-29 PROCEDURE — 99213 OFFICE O/P EST LOW 20 MIN: CPT | Mod: 25 | Performed by: FAMILY MEDICINE

## 2020-07-29 RX ORDER — BETAMETHASONE DIPROPIONATE 0.05 %
OINTMENT (GRAM) TOPICAL
Qty: 45 G | Refills: 1 | Status: SHIPPED | OUTPATIENT
Start: 2020-07-29 | End: 2020-07-29

## 2020-07-29 RX ORDER — BETAMETHASONE DIPROPIONATE 0.05 %
OINTMENT (GRAM) TOPICAL
Qty: 45 G | Refills: 1 | Status: SHIPPED | OUTPATIENT
Start: 2020-07-29 | End: 2020-09-08

## 2020-07-29 RX ORDER — PRAVASTATIN SODIUM 40 MG
TABLET ORAL
Qty: 90 TABLET | Refills: 3 | Status: SHIPPED | OUTPATIENT
Start: 2020-07-29 | End: 2021-09-05

## 2020-07-29 RX ORDER — DIAZEPAM 5 MG
TABLET ORAL
Qty: 30 TABLET | Refills: 3 | Status: SHIPPED | OUTPATIENT
Start: 2020-07-29 | End: 2021-03-03

## 2020-07-29 ASSESSMENT — ANXIETY QUESTIONNAIRES
GAD7 TOTAL SCORE: 4
6. BECOMING EASILY ANNOYED OR IRRITABLE: SEVERAL DAYS
1. FEELING NERVOUS, ANXIOUS, OR ON EDGE: SEVERAL DAYS
5. BEING SO RESTLESS THAT IT IS HARD TO SIT STILL: NOT AT ALL
3. WORRYING TOO MUCH ABOUT DIFFERENT THINGS: SEVERAL DAYS
2. NOT BEING ABLE TO STOP OR CONTROL WORRYING: SEVERAL DAYS
IF YOU CHECKED OFF ANY PROBLEMS ON THIS QUESTIONNAIRE, HOW DIFFICULT HAVE THESE PROBLEMS MADE IT FOR YOU TO DO YOUR WORK, TAKE CARE OF THINGS AT HOME, OR GET ALONG WITH OTHER PEOPLE: NOT DIFFICULT AT ALL
7. FEELING AFRAID AS IF SOMETHING AWFUL MIGHT HAPPEN: NOT AT ALL

## 2020-07-29 ASSESSMENT — PATIENT HEALTH QUESTIONNAIRE - PHQ9
SUM OF ALL RESPONSES TO PHQ QUESTIONS 1-9: 3
5. POOR APPETITE OR OVEREATING: NOT AT ALL

## 2020-07-29 ASSESSMENT — MIFFLIN-ST. JEOR: SCORE: 1515.29

## 2020-07-29 NOTE — PROGRESS NOTES
SUBJECTIVE:   CC: Kenya Rubalcava is an 59 year old woman who presents for preventive health visit.     Healthy Habits:    Do you get at least three servings of calcium containing foods daily (dairy, green leafy vegetables, etc.)? yes    Amount of exercise or daily activities, outside of work: 0 day(s) per week    Problems taking medications regularly No    Medication side effects: No    Have you had an eye exam in the past two years? yes    Do you see a dentist twice per year? yes    Do you have sleep apnea, excessive snoring or daytime drowsiness?no      Hyperlipidemia Follow-Up    Are you regularly taking any medication or supplement to lower your cholesterol?   Yes-      Are you having muscle aches or other side effects that you think could be caused by your cholesterol lowering medication?  No    Hypertension Follow-up    Do you check your blood pressure regularly outside of the clinic? No     Are you following a low salt diet? Yes    Are your blood pressures ever more than 140 on the top number (systolic) OR more   than 90 on the bottom number (diastolic), for example 140/90? No    Anxiety Follow-Up    How are you doing with your anxiety since your last visit? Hard to say, anxiety has increased with work stress.     Are you having other symptoms that might be associated with anxiety? No    Have you had a significant life event? OTHER:  very stressed     Are you feeling depressed? Yes:       Do you have any concerns with your use of alcohol or other drugs? No    Social History     Tobacco Use     Smoking status: Never Smoker     Smokeless tobacco: Never Used   Substance Use Topics     Alcohol use: Yes     Alcohol/week: 0.0 standard drinks     Comment: rare      Drug use: No     ELIZA-7 SCORE 8/17/2016 7/19/2019 7/29/2020   Total Score - - -   Total Score 14 6 4     PHQ 8/17/2016 7/19/2019 7/29/2020   PHQ-9 Total Score 3 6 3   Q9: Thoughts of better off dead/self-harm past 2 weeks Not at all  Not at all Not at all           Today's PHQ-2 Score:   PHQ-2 ( 1999 Pfizer) 7/19/2019 12/14/2017   Q1: Little interest or pleasure in doing things 0 0   Q2: Feeling down, depressed or hopeless 0 0   PHQ-2 Score 0 0   Q1: Little interest or pleasure in doing things - -   Q2: Feeling down, depressed or hopeless - -   PHQ-2 Score - -       Abuse: Current or Past(Physical, Sexual or Emotional)- No  Do you feel safe in your environment? Yes    Have you ever done Advance Care Planning? (For example, a Health Directive, POLST, or a discussion with a medical provider or your loved ones about your wishes): No, advance care planning information given to patient to review.  Patient declined advance care planning discussion at this time.    Social History     Tobacco Use     Smoking status: Never Smoker     Smokeless tobacco: Never Used   Substance Use Topics     Alcohol use: Yes     Alcohol/week: 0.0 standard drinks     Comment: rare      If you drink alcohol do you typically have >3 drinks per day or >7 drinks per week? No                     Reviewed orders with patient.  Reviewed health maintenance and updated orders accordingly - Yes  Lab work is in process    Mammogram Screening: Patient over age 50, mutual decision to screen reflected in health maintenance.    Pertinent mammograms are reviewed under the imaging tab.  History of abnormal Pap smear: Status post benign hysterectomy. Health Maintenance and Surgical History updated.     Reviewed and updated as needed this visit by clinical staff  Tobacco  Allergies         Reviewed and updated as needed this visit by Provider        Past Medical History:   Diagnosis Date     Abnormal Papanicolaou smear of cervix and cervical HPV 1986     Anxiety      Anxiety disorder in conditions classified elsewhere      Calculus of kidney 08/11/1988    Calcium renal stone, oxylate     Calculus of ureter 06/30/1985    LEFT urteral stone     Excessive or frequent menstruation 07/07/2003      Labyrinthitis, unspecified      Lump or mass in breast 10/27/1999    RIGHT breast mass     Variants of migraine, not elsewhere classified, without mention of intractable migraine without mention of status migrainosus 02/07/2002      Past Surgical History:   Procedure Laterality Date     CARPAL TUNNEL RELEASE RT/LT  08/2008    Left      COLONOSCOPY  8/7/2012    Procedure: COLONOSCOPY;  Colonoscopy;  Surgeon: Kailyn Lopez MD;  Location: [facility]     CYSTOSCOPY, RETROGRADES, INSERT STENT URETER(S), COMBINED Left 8/19/2016    Procedure: COMBINED CYSTOSCOPY, RETROGRADES, INSERT STENT URETER(S);  Surgeon: Terence Carpenter MD;  Location: [facility]     esteem implant  06/23/2011    Hearing implant right ear.     HYSTERECTOMY, PAP NO LONGER INDICATED  12/2004    Vaginal hysterectomy     LAPAROSCOPIC ABLATION ENDOMETRIOSIS  07/2004     LASER HOLMIUM LITHOTRIPSY URETER(S), INSERT STENT, COMBINED Left 9/12/2016    Procedure: COMBINED CYSTOSCOPY, URETEROSCOPY, LASER HOLMIUM LITHOTRIPSY URETER(S), INSERT STENT;  Surgeon: Kailyn Dozier MD;  Location: [facility] OR     LEFT wrist surgery       Sinus surgery with repair of deviated septum       TUBAL LIGATION       She also fell on the left wrist in the winter. She has had persistent pain in the hand since that time she has been wearing a splint but has pain whens eh does not wear it .   She does have upper back pain related to her large breasts she would be interested with a reduction ROS:  CONSTITUTIONAL: NEGATIVE for fever, chills, change in weight  INTEGUMENTARY/SKIN: NEGATIVE for worrisome rashes, moles or lesions  EYES: NEGATIVE for vision changes or irritation  ENT: NEGATIVE for ear, mouth and throat problems  RESP: NEGATIVE for significant cough or SOB  BREAST: NEGATIVE for masses, tenderness or discharge  CV: NEGATIVE for chest pain, palpitations or peripheral edema  GI: NEGATIVE for nausea, abdominal pain, heartburn, or change in bowel habits  :  "NEGATIVE for unusual urinary or vaginal symptoms. No vaginal bleeding.  MUSCULOSKELETAL:left hand pain upper back pain   NEURO: NEGATIVE for weakness, dizziness or paresthesias  PSYCHIATRIC: POSITIVE foranxiety     OBJECTIVE:   /71   Pulse 63   Temp 97.1  F (36.2  C) (Tympanic)   Ht 1.6 m (5' 3\")   Wt 96.6 kg (213 lb)   SpO2 100%   BMI 37.73 kg/m    EXAM:  GENERAL APPEARANCE: healthy, alert and no distress  EYES: Eyes grossly normal to inspection, PERRL and conjunctivae and sclerae normal  HENT: ear canals and TM's normal, nose and mouth without ulcers or lesions, oropharynx clear and oral mucous membranes moist  NECK: no adenopathy, no asymmetry, masses, or scars and thyroid normal to palpation  RESP: lungs clear to auscultation - no rales, rhonchi or wheezes  BREAST: normal without masses, tenderness or nipple discharge and no palpable axillary masses or adenopathy  BREAST: pendulous and large   CV: regular rate and rhythm, normal S1 S2, no S3 or S4, no murmur, click or rub, no peripheral edema and peripheral pulses strong  ABDOMEN: soft, nontender, no hepatosplenomegaly, no masses and bowel sounds normal  MS: gait is age appropriate without ataxia, LUE exam reveals normal strength and muscle mass tender snuffbox and palm no swelling   Upper back with tight trapezii tender trigger points periscapular   SKIN: no suspicious lesions or rashes  NEURO: Normal strength and tone, sensory exam grossly normal, mentation intact and speech normal  PSYCH: mentation appears normal and affect normal/bright    Diagnostic Test Results:  Labs reviewed in Epic  No results found for this or any previous visit (from the past 24 hour(s)).  Xray looks like a scaphoid fracture ? Healed radiology read as neg   ASSESSMENT/PLAN:   1. Routine general medical examination at a health care facility      2. Psoriasis  stable  - betamethasone dipropionate (DIPROSONE) 0.05 % external ointment; Apply sparingly to affected area twice " "daily as needed.  Do not apply to face.  Dispense: 45 g; Refill: 1    3. Hyperlipidemia LDL goal <130    - Lipid panel reflex to direct LDL Fasting  - ALT  - pravastatin (PRAVACHOL) 40 MG tablet; TAKE ONE TABLET BY MOUTH EVERY DAY AT BEDTIME, due for lab work  Dispense: 90 tablet; Refill: 3    4. Anxiety  Worsened   - diazepam (VALIUM) 5 MG tablet; TAKE 1 TABLET BY MOUTH EVERY 12 HOURS AS NEEDED FOR ANXIETY OR SLEEP.  Dispense: 30 tablet; Refill: 3    5. Anxiety disorder due to medical condition    - diazepam (VALIUM) 5 MG tablet; TAKE 1 TABLET BY MOUTH EVERY 12 HOURS AS NEEDED FOR ANXIETY OR SLEEP.  Dispense: 30 tablet; Refill: 3    6. Hand injury, left, initial encounter  ? Scaphoid fracture versus arthritis will refer either way as she has persistent pain   - XR Wrist Left G/E 3 Views; Future  - Orthopedic & Spine  Referral; Future    7. Essential hypertension, benign  Well controlled  - Basic metabolic panel    8. Hypertrophy of breast  Can consider reduction to reduce back pain and headache - PLASTIC SURGERY REFERRAL    COUNSELING:   Reviewed preventive health counseling, as reflected in patient instructions    Estimated body mass index is 37.73 kg/m  as calculated from the following:    Height as of this encounter: 1.6 m (5' 3\").    Weight as of this encounter: 96.6 kg (213 lb).    Weight management plan: Discussed healthy diet and exercise guidelines     reports that she has never smoked. She has never used smokeless tobacco.      Counseling Resources:  ATP IV Guidelines  Pooled Cohorts Equation Calculator  Breast Cancer Risk Calculator  FRAX Risk Assessment  ICSI Preventive Guidelines  Dietary Guidelines for Americans, 2010  USDA's MyPlate  ASA Prophylaxis  Lung CA Screening    Carlie Real MD  [facility]  "

## 2020-07-29 NOTE — TELEPHONE ENCOUNTER
Reason for Call:  Other prescription    Detailed comments: need clarificaton on betamethasone.  Pharm needs day supply.      Phone Number Patient can be reached at: Other phone number:  pharm*    Best Time: any    Can we leave a detailed message on this number? YES    Call taken on 7/29/2020 at 10:50 AM by Elaine Ocampo

## 2020-07-30 ASSESSMENT — ANXIETY QUESTIONNAIRES: GAD7 TOTAL SCORE: 4

## 2020-08-31 DIAGNOSIS — I10 ESSENTIAL HYPERTENSION, BENIGN: ICD-10-CM

## 2020-08-31 DIAGNOSIS — F41.9 ANXIETY: ICD-10-CM

## 2020-08-31 RX ORDER — TRIAMTERENE AND HYDROCHLOROTHIAZIDE 37.5; 25 MG/1; MG/1
CAPSULE ORAL
Qty: 90 CAPSULE | Refills: 0 | Status: SHIPPED | OUTPATIENT
Start: 2020-08-31 | End: 2020-12-01

## 2020-08-31 RX ORDER — VENLAFAXINE HYDROCHLORIDE 150 MG/1
CAPSULE, EXTENDED RELEASE ORAL
Qty: 90 CAPSULE | Refills: 0 | Status: SHIPPED | OUTPATIENT
Start: 2020-08-31 | End: 2020-12-01

## 2020-08-31 RX ORDER — ATENOLOL 50 MG/1
TABLET ORAL
Qty: 90 TABLET | Refills: 0 | Status: SHIPPED | OUTPATIENT
Start: 2020-08-31 | End: 2020-12-01

## 2020-09-08 ENCOUNTER — TELEPHONE (OUTPATIENT)
Dept: FAMILY MEDICINE | Facility: CLINIC | Age: 60
End: 2020-09-08

## 2020-09-08 DIAGNOSIS — L40.9 PSORIASIS: ICD-10-CM

## 2020-09-08 RX ORDER — BETAMETHASONE DIPROPIONATE 0.05 %
OINTMENT (GRAM) TOPICAL
Qty: 45 G | Refills: 4 | Status: SHIPPED | OUTPATIENT
Start: 2020-09-08

## 2020-09-08 NOTE — TELEPHONE ENCOUNTER
Reason for Call:  Other clarification    Detailed comments: pharmacy is calling for clarification on RX: betamethasone dipropionate (DIPROSONE) 0.05 % external ointment, would like to know for insurance purposes where the cream will be applied and how long the tube will last. Please call to discuss. Thank you.    Phone Number Patient can be reached at: [phone]    Best Time:     Can we leave a detailed message on this number? YES    Call taken on 9/8/2020 at 9:16 AM by Zena Watts

## 2020-09-30 ENCOUNTER — VIRTUAL VISIT (OUTPATIENT)
Dept: FAMILY MEDICINE | Facility: OTHER | Age: 60
End: 2020-09-30

## 2020-09-30 DIAGNOSIS — Z20.822 SUSPECTED 2019 NOVEL CORONAVIRUS INFECTION: Primary | ICD-10-CM

## 2020-09-30 NOTE — PROGRESS NOTES
"Date: 2020 10:23:32  Clinician: Armand Rizzo  Clinician NPI: [id number]  Patient: Kenya Rubalcava  Patient : 1960  Patient Address: [address] [place] [address]  Patient Phone: [phone]  Visit Protocol: URI  Patient Summary:  Kenya is a 60 year old ( : 1960 ) female who initiated a [facility] Visit for COVID-19 (Coronavirus) evaluation and screening. When asked the question \"Please sign me up to receive news, health information and promotions from [facility].\", Kenya responded \"Yes\".    Kenya states her symptoms started gradually 3-4 days ago.   Her symptoms consist of ear pain, a headache, a cough, nasal congestion, facial pain or pressure, malaise, and a sore throat. She is experiencing difficulty breathing due to nasal congestion but she is not short of breath.   Symptom details     Nasal secretions: The color of her mucus is yellow.    Cough: Kenya coughs a few times an hour and her cough is not more bothersome at night. Phlegm comes into her throat when she coughs. She believes her cough is caused by post-nasal drip. The color of the phlegm is white and yellow.     Sore throat: Kenya reports having mild throat pain (1-3 on a 10 point pain scale), does not have exudate on her tonsils, and can swallow liquids. The lymph nodes in her neck are not enlarged. A rash has not appeared on the skin since the sore throat started.     Facial pain or pressure: The facial pain or pressure feels worse when bending over or leaning forward.     Headache: She states the headache is severe (7-9 on a 10 point pain scale).      Kenya denies having wheezing, fever, enlarged lymph nodes, anosmia, vomiting, rhinitis, nausea, myalgias, chills, teeth pain, ageusia, and diarrhea. She also denies double sickening (worsening symptoms after initial improvement), having a sinus infection within the past year, taking antibiotic medication in the past month, and having recent facial or sinus surgery in " the past 60 days.   Precipitating events  Within the past week, Kenya has not been exposed to someone with strep throat. She has not recently been exposed to someone with influenza. Kenya has not been in close contact with any high risk individuals.   Pertinent COVID-19 (Coronavirus) information  In the past 14 days, Keyna has not worked in a congregate living setting.   She does not work or volunteer as healthcare worker or a  and does not work or volunteer in a healthcare facility.   Kenya also has not lived in a congregate living setting in the past 14 days. She does not live with a healthcare worker.   Kenya has not had a close contact with a laboratory-confirmed COVID-19 patient within 14 days of symptom onset.   Since December 2019, Kenya and has not had upper respiratory infection or influenza-like illness. Has not been diagnosed with lab-confirmed COVID-19 test   Pertinent medical history  Kenya does not get yeast infections when she takes antibiotics.   Kenya needs a return to work/school note.   Weight: 190 lbs   Kenya does not smoke or use smokeless tobacco.   Additional information as reported by the patient (free text): I have what seems to have started with normal allergies, then went into what feels like a severe sinus infection. 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   Weight: 190 lbs    MEDICATIONS: Effexor XR oral, pravastatin oral, trazodone oral, atenolol-chlorthalidone oral, triamterene-hydrochlorothiazide oral, ALLERGIES: NKDA  Clinician Response:  Dear eKnya,   Your symptoms show that you may have coronavirus (COVID-19). This illness can cause fever, cough and trouble breathing. Many people get a mild case and get better on their own. Some people can get very sick.  What should I do?  We would like to test you for this virus.   1. Please call [phone] to schedule your visit. Explain that you were  "[facility] have [facility] COVID-19 [facility]. Be ready to share your [facility].  The following will serve as your written order for this COVID Test, ordered by me, for the indication of suspected COVID [Z20.828]: The test will be ordered in [employer], our electronic health record, after you are scheduled. It will show as ordered and authorized by Mookie Gomez MD.  Order: COVID-19 (Coronavirus) PCR for SYMPTOMATIC testing from [facility].      2. When it's time for your COVID test:  Stay at least 6 feet away from others. (If someone will drive you to your test, stay in the backseat, as far away from the  as you can.)   Cover your mouth and nose with a mask, tissue or washcloth.  Go straight to the testing site. Don't make any stops on the way there or back.      3.Starting now: Stay home and away from others (self-isolate) until:   You've had no fever---and no medicine that reduces fever---for one full day (24 hours). And...   Your other symptoms have gotten better. For example, your cough or breathing has improved. And...   At least 10 days have passed since your symptoms started.       During this time, don't leave the house except for testing or medical care.   Stay in your own room, even for meals. Use your own bathroom if you can.   Stay away from others in your home. No hugging, kissing or shaking hands. No visitors.  Don't go to work, school or anywhere else.    Clean \"high touch\" surfaces often (doorknobs, counters, handles, etc.). Use a household cleaning spray or wipes. You'll find a full list of  on the EPA website: www.epa.gov/pesticide-registration/list-n-disinfectants-use-against-sars-cov-2.   Cover your mouth and nose with a mask, tissue or washcloth to avoid spreading germs.  Wash your hands and face often. Use soap and water.  Caregivers in these groups are at risk for severe illness due to COVID-19:  o People 65 years and older  o People who live in a nursing home or long-term care " facility  o People with chronic disease (lung, heart, cancer, diabetes, kidney, liver, immunologic)  o People who have a weakened immune system, including those who:   Are in cancer treatment  Take medicine that weakens the immune system, such as corticosteroids  Had a bone marrow or organ transplant  Have an immune deficiency  Have poorly controlled HIV or AIDS  Are obese (body mass index of 40 or higher)  Smoke regularly   o Caregivers should wear gloves while washing dishes, handling laundry and cleaning bedrooms and bathrooms.  o Use caution when washing and drying laundry: Don't shake dirty laundry, and use the warmest water setting that you can.  o For more tips, go to www.cdc.gov/coronavirus/2019-ncov/downloads/10Things.pdf.    4.Sign up for [employer]. We know it's scary to hear that you might have COVID-19. We want to track your symptoms to make sure you're okay over the next 2 weeks. Please look for an email from [employer]---this is a free, online program that we'll use to keep in touch. To sign up, follow the link in the email. Learn more at [email or web address]  How can I take care of myself?   Get lots of rest. Drink extra fluids (unless a doctor has told you not to).   Take Tylenol (acetaminophen) for fever or pain. If you have liver or kidney problems, ask your family doctor if it's okay to take Tylenol.   Adults can take either:    650 mg (two 325 mg pills) every 4 to 6 hours, or...   1,000 mg (two 500 mg pills) every 8 hours as needed.    Note: Don't take more than 3,000 mg in one day. Acetaminophen is found in many medicines (both prescribed and over-the-counter medicines). Read all labels to be sure you don't take too much.   For children, check the Tylenol bottle for the right dose. The dose is based on the child's age or weight.    If you have other health problems (like cancer, heart failure, an organ transplant or severe kidney disease): Call your specialty clinic if you  don't feel better in the next 2 days.       Know when to call 911. Emergency warning signs include:    Trouble breathing or shortness of breath Pain or pressure in the chest that doesn't go away Feeling confused like you haven't felt before, or not being able to wake up Bluish-colored lips or face.  Where can I get more information?   [facility] -- About COVID-19: [email or web address]   CDC -- What to Do If You're Sick: www.cdc.gov/coronavirus/2019-ncov/about/steps-when-sick.html   CDC -- Ending Home Isolation: www.cdc.gov/coronavirus/2019-ncov/hcp/disposition-in-home-patients.html   CDC -- Caring for Someone: www.cdc.gov/coronavirus/2019-ncov/if-you-are-sick/care-for-someone.html   [facility] -- Interim Guidance for Hospital Discharge to Home: www.health.[facility].mn./diseases/coronavirus/hcp/hospdischarge.pdf   [facility] clinical trials (COVID-19 research studies): clinicalaffairs.[facility]-clinical-trials    Below are the COVID-19 hotlines at the [facility] ([facility]). Interpreters are available.    For health questions: Call [phone] or 1-279.368.1616 (7 a.m. to 7 p.m.) For questions about schools and childcare: Call 451-090-3442 or 1-136.337.9693 (7 a.m. to 7 p.m.)    Diagnosis: Acute upper respiratory infection, unspecified  Diagnosis ICD: J06.9  Additional Clinician Notes:  If your symptoms are not improving or worsen, please go to one of our urgent care locations for further evaluation and possible strep testing.

## 2020-10-02 DIAGNOSIS — Z20.822 SUSPECTED 2019 NOVEL CORONAVIRUS INFECTION: ICD-10-CM

## 2020-10-02 PROCEDURE — U0003 INFECTIOUS AGENT DETECTION BY NUCLEIC ACID (DNA OR RNA); SEVERE ACUTE RESPIRATORY SYNDROME CORONAVIRUS 2 (SARS-COV-2) (CORONAVIRUS DISEASE [COVID-19]), AMPLIFIED PROBE TECHNIQUE, MAKING USE OF HIGH THROUGHPUT TECHNOLOGIES AS DESCRIBED BY CMS-2020-01-R: HCPCS | Performed by: FAMILY MEDICINE

## 2020-10-03 LAB
SARS-COV-2 RNA SPEC QL NAA+PROBE: NOT DETECTED
SPECIMEN SOURCE: NORMAL

## 2020-10-05 ENCOUNTER — VIRTUAL VISIT (OUTPATIENT)
Dept: FAMILY MEDICINE | Facility: CLINIC | Age: 60
End: 2020-10-05
Payer: COMMERCIAL

## 2020-10-05 DIAGNOSIS — J01.90 ACUTE SINUSITIS WITH SYMPTOMS > 10 DAYS: Primary | ICD-10-CM

## 2020-10-05 PROCEDURE — 99213 OFFICE O/P EST LOW 20 MIN: CPT | Mod: 95 | Performed by: FAMILY MEDICINE

## 2020-10-05 NOTE — PROGRESS NOTES
"Kenya Rubalcava is a 60 year old female who is being evaluated via a billable telephone visit.      The patient has been notified of following:     \"This telephone visit will be conducted via a call between you and your physician/provider. We have found that certain health care needs can be provided without the need for a physical exam.  This service lets us provide the care you need with a short phone conversation.  If a prescription is necessary we can send it directly to your pharmacy.  If lab work is needed we can place an order for that and you can then stop by our lab to have the test done at a later time.    Telephone visits are billed at different rates depending on your insurance coverage. During this emergency period, for some insurers they may be billed the same as an in-person visit.  Please reach out to your insurance provider with any questions.    If during the course of the call the physician/provider feels a telephone visit is not appropriate, you will not be charged for this service.\"    Patient has given verbal consent for Telephone visit?  Yes    What phone number would you like to be contacted at? [phone]    How would you like to obtain your AVS? Josiehart      Subjective     Kenya Rubalcava is a 60 year old female who presents via phone visit today for the following health issues:    HPI                Symptoms: cc Present Absent Comment   Fever/Chills   x    Fatigue  x     Headache  x     Muscle or Body  Aches   x    Eye Irritation  x  Pain behind eyes   Sneezing       Nasal Isaías/Drg x      Sinus Pressure/Pain x      Dental pain   x    Sore Throat  x     Swollen Glands   x    Ear Pain/Fullness  x  plugged   Cough   x    Wheeze   x    Chest Discomfort   x    Shortness of breath   x    Abdominal pain   x    Emesis    x    Diarrhea   x    Other   x      Symptom duration:  started with allergies, increasing in the last week with sinus pain.    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      She is not feeling she started with allergies and now in her has been going on for 1- weeks worsening              Review of Systems   As above        Objective        She has been under a lot of stress she is a  and she has been having a lot going on with the distance   Vitals:  No vitals were obtained today due to virtual visit.    healthy, alert and no distress  PSYCH: Alert and oriented times 3; coherent speech, normal   rate and volume, able to articulate logical thoughts, able   to abstract reason, no tangential thoughts, no hallucinations   or delusions  Her affect is normal  RESP: N, no audible wheezing, able to talk in full sentences  Remainder of exam unable to be completed due to telephone visits            Assessment/Plan:    Assessment & Plan     Acute sinusitis with symptoms > 10 days  Patient instructed to return to the office in 1 week for reevaluation unless completely resolved. Signs and symptoms of worsening are reviewed with the patient. If symptoms acutely change and condition worsens patient is instructed to seek medical evaluation through the office or urgent care department or if during non business hours through the emergency department.    - amoxicillin-clavulanate (AUGMENTIN) 875-125 MG tablet; Take 1 tablet by mouth 2 times daily        Patient Instructions   mucinex and nasal saline mist         No follow-ups on file.    Carlie Real MD  [facility]    Phone call duration:  6 minutes

## 2020-10-05 NOTE — PATIENT INSTRUCTIONS
mucinex and nasal saline mist   Sinus pressure is primarily a problem of drainage.  You can best help your body clear the sinus secretions and pressure by opening up the natural passageways which are often blocked by viral colds and allergies.      Short courses of a nasal decongestant spray (Afrin or Neosinephrine) are one of the most effective tools in opening sinus drainage passageways.  Their use should be restricted to 3 days though due to the high risk of nasal addiction.  Sometimes a nasal saline spray will help rinse out the nasal passages and feel good.     Guaifenesin (Robitussin or Mucinex) helps loosen secretions and often help make the mucous more liquid and easier to clear.    For pain and fevers, acetaminophen (Tylenol) is most appropriate.  Ibuprofen (Advil) or naproxen (Aleve) are useful too and last longer but they can cause elevation of blood pressure or stomach problems.    Antihistamines (Benadryl, Dimetapp, etc.) cause sedation, confusion, bowel and urinary abnormalities and are of little use for infectious causes of cough and nasal congestion.  Their use should be reserved for allergic symptoms.    The body needs to be treated well in order to help heal itself.  Rest as needed.  It is ok to reduce food intake if appetite is poor but it is quite important to maintain/increase fluid intake.  Sinus pressure and infections usually go away on their own with appropriate care.

## 2020-10-07 DIAGNOSIS — F51.01 PRIMARY INSOMNIA: ICD-10-CM

## 2020-10-08 RX ORDER — TRAZODONE HYDROCHLORIDE 150 MG/1
TABLET ORAL
Qty: 90 TABLET | Refills: 0 | Status: SHIPPED | OUTPATIENT
Start: 2020-10-08 | End: 2020-11-30

## 2020-10-17 DIAGNOSIS — F51.01 PRIMARY INSOMNIA: ICD-10-CM

## 2020-10-20 RX ORDER — TRAZODONE HYDROCHLORIDE 150 MG/1
TABLET ORAL
Qty: 90 TABLET | Refills: 0 | OUTPATIENT
Start: 2020-10-20

## 2020-11-22 ENCOUNTER — HEALTH MAINTENANCE LETTER (OUTPATIENT)
Age: 60
End: 2020-11-22

## 2020-11-30 ENCOUNTER — OFFICE VISIT (OUTPATIENT)
Dept: FAMILY MEDICINE | Facility: CLINIC | Age: 60
End: 2020-11-30
Payer: COMMERCIAL

## 2020-11-30 ENCOUNTER — TELEPHONE (OUTPATIENT)
Dept: FAMILY MEDICINE | Facility: CLINIC | Age: 60
End: 2020-11-30

## 2020-11-30 VITALS
DIASTOLIC BLOOD PRESSURE: 69 MMHG | HEART RATE: 59 BPM | SYSTOLIC BLOOD PRESSURE: 139 MMHG | OXYGEN SATURATION: 97 % | BODY MASS INDEX: 37.73 KG/M2 | TEMPERATURE: 97.3 F | HEIGHT: 63 IN

## 2020-11-30 DIAGNOSIS — N20.0 KIDNEY STONES: ICD-10-CM

## 2020-11-30 DIAGNOSIS — F51.01 PRIMARY INSOMNIA: ICD-10-CM

## 2020-11-30 DIAGNOSIS — R10.9 RIGHT FLANK PAIN: Primary | ICD-10-CM

## 2020-11-30 DIAGNOSIS — Z23 NEED FOR INFLUENZA VACCINATION: ICD-10-CM

## 2020-11-30 DIAGNOSIS — Z23 NEED FOR PROPHYLACTIC VACCINATION AND INOCULATION AGAINST INFLUENZA: ICD-10-CM

## 2020-11-30 PROCEDURE — 90682 RIV4 VACC RECOMBINANT DNA IM: CPT | Performed by: FAMILY MEDICINE

## 2020-11-30 PROCEDURE — 99214 OFFICE O/P EST MOD 30 MIN: CPT | Mod: 25 | Performed by: FAMILY MEDICINE

## 2020-11-30 PROCEDURE — 90471 IMMUNIZATION ADMIN: CPT | Performed by: FAMILY MEDICINE

## 2020-11-30 RX ORDER — TIZANIDINE 2 MG/1
2-4 TABLET ORAL 3 TIMES DAILY PRN
Qty: 90 TABLET | Refills: 1 | Status: SHIPPED | OUTPATIENT
Start: 2020-11-30 | End: 2020-12-02

## 2020-11-30 RX ORDER — TEMAZEPAM 15 MG/1
15-30 CAPSULE ORAL
Qty: 60 CAPSULE | Refills: 3 | Status: SHIPPED | OUTPATIENT
Start: 2020-11-30 | End: 2021-07-11

## 2020-11-30 NOTE — PATIENT INSTRUCTIONS
Patient Education     Iliotibial Band Stretch (Flexibility)    1. Stand next to a chair. Hold onto the chair with your right hand for support. Cross your right leg behind your left leg.  2. Lean your right hip toward the right. Feel the stretch at the outside of your hip.  3. Hold for 30 to 60 seconds. Then relax.  4. Repeat 2 times, or as instructed.  5. Switch sides and repeat.  6. Do this 3 times a day, or as instructed.     Tip: Don t bend forward or twist at the waist.   [employer] last reviewed this educational content on 3/29/2016    2589-0517 [employer]. [address] [place] [address]. All rights reserved. This information is not intended as a substitute for professional medical care. Always follow your healthcare professional's instructions.           Patient Education     Side Lying Hip Abduction (Strength)    1. Lie down on the floor on your side. Rest your head on your arm. Bend your legs at the knees.  2. Keep your feet together and lift your top leg up so that your knees are . Keep your hips steady.     3. Slowly lower your leg back down.  4. Repeat 10 times, or as instructed.  5. Switch sides if instructed.     Challenge yourself  Put an elastic band or tubing around your thighs. Raise and lower your top leg slowly and steadily.      [employer] last reviewed this educational content on 3/29/2016    6409-2592 [employer]. [address] [place] [address]. All rights reserved. This information is not intended as a substitute for professional medical care. Always follow your healthcare professional's instructions.         try the temazepam 1-2 capsules at bedtime if you have side effects let me know and also let me working or not . Try tylenol /acetomenaphin or advil NOT pm  Along with the sleeping pill

## 2020-11-30 NOTE — TELEPHONE ENCOUNTER
Prior Authorization Retail Medication Request    Medication/Dose: TEMAZEPAM 15MG & TIZANIDINE 2MG TABS          Rash

## 2020-11-30 NOTE — PROGRESS NOTES
"Subjective     Kenya EWA Rubalcava is a 60 year old female who presents to clinic today for the following health issues:    HPI            Chief Complaint   Patient presents with     Back Pain     for about 3 months, lower right side. wondering if it is related to kidney stones, similar pain in the past.       Recheck Medication     Discuss sleep aid dose         Has history of  Nephrolithiasis and the pain is lower and more achy more like when she had the stent in   Started about 3 months ago now just persistent and achy no fever or chills   Not as extreme   At rest it is a 1 no movement changes the pain   Lying down at night makes it worse has been using heating pad at night with some relief   Walking or standing does not make it worse         The trazodone at 150 mg is not working anymore   Has not been taking the valium just 2-3 times per week   She is incredibly stressed at work as she is a    She also has some pain that radiates down her legs this is from the trochanter down. No weakness           Review of Systems   Constitutional, HEENT, cardiovascular, pulmonary, gi and gu systems are negative, except as otherwise noted.      Objective    /69   Pulse 59   Temp 97.3  F (36.3  C) (Tympanic)   Ht 1.6 m (5' 3\")   SpO2 97%   BMI 37.73 kg/m    Body mass index is 37.73 kg/m .  Physical Exam   GENERAL: healthy, alert and no distress  RESP: lungs clear to auscultation - no rales, rhonchi or wheezes  CV: regular rate and rhythm, normal S1 S2, no S3 or S4, no murmur, click or rub, no peripheral edema and peripheral pulses strong  ABDOMEN: soft, nontender, no hepatosplenomegaly, no masses and bowel sounds normal  MS: RLE exam shows normal strength and muscle mass and tender over trochanter and LLE exam shows normal strength and muscle mass tender over the trochanter   BACK: no CVA tenderness, no paralumbar tenderness there is tenderness subcostal     No results found for any visits on " 11/30/20.        Assessment & Plan     Right flank pain    - CT Abdomen Pelvis w/o Contrast; Future    Kidney stones    - CT Abdomen Pelvis w/o Contrast; Future    Primary insomnia   try the temazepam 1-2 capsules at bedtime if you have side effects let me know and also let me working or not . Try tylenol /acetomenaphin or advil NOT pm  Along with the sleeping pill  Will change to re  - temazepam (RESTORIL) 15 MG capsule; Take 1-2 capsules (15-30 mg) by mouth nightly as needed for sleep Do not take with valium    Need for influenza vaccination  done    Need for prophylactic vaccination and inoculation against influenza    - INFLUENZA QUAD, RECOMBINANT, P-FREE (RIV4) (FLUBLOCK) [32327]        See Patient Instructions    Return in about 4 weeks (around 12/28/2020) for if not improving.    Carlie Real MD  [facility]

## 2020-12-01 ENCOUNTER — TELEPHONE (OUTPATIENT)
Dept: FAMILY MEDICINE | Facility: CLINIC | Age: 60
End: 2020-12-01

## 2020-12-01 DIAGNOSIS — R10.9 RIGHT FLANK PAIN: ICD-10-CM

## 2020-12-01 DIAGNOSIS — I10 ESSENTIAL HYPERTENSION, BENIGN: ICD-10-CM

## 2020-12-01 DIAGNOSIS — F41.9 ANXIETY: ICD-10-CM

## 2020-12-01 RX ORDER — TRIAMTERENE AND HYDROCHLOROTHIAZIDE 37.5; 25 MG/1; MG/1
CAPSULE ORAL
Qty: 90 CAPSULE | Refills: 2 | Status: SHIPPED | OUTPATIENT
Start: 2020-12-01 | End: 2021-09-05

## 2020-12-01 RX ORDER — VENLAFAXINE HYDROCHLORIDE 150 MG/1
CAPSULE, EXTENDED RELEASE ORAL
Qty: 90 CAPSULE | Refills: 2 | Status: SHIPPED | OUTPATIENT
Start: 2020-12-01 | End: 2021-09-05

## 2020-12-01 RX ORDER — ATENOLOL 50 MG/1
TABLET ORAL
Qty: 90 TABLET | Refills: 3 | Status: SHIPPED | OUTPATIENT
Start: 2020-12-01 | End: 2021-10-05

## 2020-12-01 NOTE — TELEPHONE ENCOUNTER
Central Prior Authorization Team   Phone: [phone]      PA Initiation    Medication: TEMAZEPAM 15MG   Insurance Company:    Pharmacy Filling the Rx: [facility] [place] - [address] [address]  Filling Pharmacy Phone: [phone]  Filling Pharmacy Fax:    Start Date: 12/1/2020

## 2020-12-01 NOTE — TELEPHONE ENCOUNTER
Prescription approved per [facility] Refill Protocol.  Mark Cosme RN

## 2020-12-01 NOTE — TELEPHONE ENCOUNTER
Central Prior Authorization Team   Phone: [phone]      PA Initiation    Medication: tiZANidine (ZANAFLEX) 2 MG tablet  Insurance Company:    Pharmacy Filling the Rx: [facility] [place] - [address] [address]  Filling Pharmacy Phone: [phone]  Filling Pharmacy Fax:    Start Date: 12/1/2020

## 2020-12-01 NOTE — TELEPHONE ENCOUNTER
PRIOR AUTHORIZATION DENIED    Medication: TEMAZEPAM 15MG     Denial Date: 12/1/2020    Denial Rational:      Appeal Information:    If you would like to appeal, please supply P/A team with a letter of medical necessity with clinical reason.

## 2020-12-01 NOTE — TELEPHONE ENCOUNTER
PRIOR AUTHORIZATION DENIED    Medication: tiZANidine (ZANAFLEX) 2 MG tablet    Denial Date: 12/1/2020    Denial Rational:          Appeal Information:    If you would like to appeal, please supply P/A team with a letter of medical necessity with clinical reason.

## 2020-12-02 ENCOUNTER — HOSPITAL ENCOUNTER (OUTPATIENT)
Dept: CT IMAGING | Facility: CLINIC | Age: 60
Discharge: HOME | End: 2020-12-02
Attending: FAMILY MEDICINE | Admitting: FAMILY MEDICINE
Payer: COMMERCIAL

## 2020-12-02 DIAGNOSIS — N20.0 KIDNEY STONES: ICD-10-CM

## 2020-12-02 DIAGNOSIS — R10.9 RIGHT FLANK PAIN: ICD-10-CM

## 2020-12-02 PROCEDURE — 74176 CT ABD & PELVIS W/O CONTRAST: CPT

## 2020-12-02 RX ORDER — TIZANIDINE 2 MG/1
2-4 TABLET ORAL 2 TIMES DAILY PRN
Qty: 90 TABLET | Refills: 1 | Status: SHIPPED | OUTPATIENT
Start: 2020-12-02 | End: 2021-11-29

## 2020-12-03 ENCOUNTER — TELEPHONE (OUTPATIENT)
Dept: FAMILY MEDICINE | Facility: CLINIC | Age: 60
End: 2020-12-03

## 2020-12-03 DIAGNOSIS — D49.0 IPMN (INTRADUCTAL PAPILLARY MUCINOUS NEOPLASM): Primary | ICD-10-CM

## 2020-12-28 ENCOUNTER — HOSPITAL ENCOUNTER (OUTPATIENT)
Dept: MAMMOGRAPHY | Facility: CLINIC | Age: 60
End: 2020-12-28
Attending: FAMILY MEDICINE
Payer: COMMERCIAL

## 2020-12-28 ENCOUNTER — HOSPITAL ENCOUNTER (OUTPATIENT)
Dept: MRI IMAGING | Facility: CLINIC | Age: 60
End: 2020-12-28
Attending: FAMILY MEDICINE
Payer: COMMERCIAL

## 2020-12-28 DIAGNOSIS — D49.0 IPMN (INTRADUCTAL PAPILLARY MUCINOUS NEOPLASM): ICD-10-CM

## 2020-12-28 DIAGNOSIS — Z12.31 VISIT FOR SCREENING MAMMOGRAM: ICD-10-CM

## 2020-12-28 PROCEDURE — 255N000002 HC RX 255 OP 636: Performed by: FAMILY MEDICINE

## 2020-12-28 PROCEDURE — 77067 SCR MAMMO BI INCL CAD: CPT

## 2020-12-28 PROCEDURE — 74183 MRI ABD W/O CNTR FLWD CNTR: CPT

## 2020-12-28 PROCEDURE — A9585 GADOBUTROL INJECTION: HCPCS | Performed by: FAMILY MEDICINE

## 2020-12-28 RX ORDER — GADOBUTROL 604.72 MG/ML
9.5 INJECTION INTRAVENOUS ONCE
Status: COMPLETED | OUTPATIENT
Start: 2020-12-28 | End: 2020-12-28

## 2020-12-28 RX ADMIN — GADOBUTROL 9.5 ML: 604.72 INJECTION INTRAVENOUS at 10:59

## 2021-01-08 DIAGNOSIS — F51.01 PRIMARY INSOMNIA: ICD-10-CM

## 2021-01-12 RX ORDER — TRAZODONE HYDROCHLORIDE 150 MG/1
TABLET ORAL
Qty: 90 TABLET | Refills: 0 | Status: SHIPPED | OUTPATIENT
Start: 2021-01-12 | End: 2021-04-09

## 2021-03-03 DIAGNOSIS — F41.9 ANXIETY: ICD-10-CM

## 2021-03-03 DIAGNOSIS — F06.4 ANXIETY DISORDER DUE TO MEDICAL CONDITION: ICD-10-CM

## 2021-03-03 RX ORDER — DIAZEPAM 5 MG
TABLET ORAL
Qty: 30 TABLET | Refills: 0 | Status: SHIPPED | OUTPATIENT
Start: 2021-03-03 | End: 2021-11-22

## 2021-04-07 DIAGNOSIS — F51.01 PRIMARY INSOMNIA: ICD-10-CM

## 2021-04-07 DIAGNOSIS — I10 ESSENTIAL HYPERTENSION, BENIGN: Primary | ICD-10-CM

## 2021-04-08 NOTE — TELEPHONE ENCOUNTER
Routing refill request to provider for review/approval because:  Drug interaction warning  Lab order placed per HM.  Kary Costello RN

## 2021-04-09 RX ORDER — TRAZODONE HYDROCHLORIDE 150 MG/1
TABLET ORAL
Qty: 90 TABLET | Refills: 0 | Status: SHIPPED | OUTPATIENT
Start: 2021-04-09 | End: 2021-07-14

## 2021-06-07 ENCOUNTER — TELEPHONE (OUTPATIENT)
Dept: FAMILY MEDICINE | Facility: CLINIC | Age: 61
End: 2021-06-07
Payer: COMMERCIAL

## 2021-06-16 ENCOUNTER — TELEPHONE (OUTPATIENT)
Dept: FAMILY MEDICINE | Facility: CLINIC | Age: 61
End: 2021-06-16

## 2021-06-16 DIAGNOSIS — R09.82 PND (POST-NASAL DRIP): ICD-10-CM

## 2021-06-16 RX ORDER — FLUTICASONE PROPIONATE 50 MCG
1-2 SPRAY, SUSPENSION (ML) NASAL DAILY
Qty: 16 G | Refills: 3 | Status: SHIPPED | OUTPATIENT
Start: 2021-06-16 | End: 2022-08-30

## 2021-06-16 NOTE — TELEPHONE ENCOUNTER
Reason for Call:  Medication or medication refill:    Do you use a [facility]?  Name of the pharmacy and phone number for the current request:  [facility]    Name of the medication requested: fluticasone    Other request: none    Can we leave a detailed message on this number? YES    Phone number patient can be reached at: Home number on file [phone] (home)    Best Time: any    Call taken on 6/16/2021 at 11:32 AM by Mecca Franco

## 2021-07-12 ENCOUNTER — LAB (OUTPATIENT)
Dept: LAB | Facility: CLINIC | Age: 61
End: 2021-07-12
Payer: COMMERCIAL

## 2021-07-12 ENCOUNTER — E-VISIT (OUTPATIENT)
Dept: FAMILY MEDICINE | Facility: CLINIC | Age: 61
End: 2021-07-12

## 2021-07-12 DIAGNOSIS — R35.0 URINARY FREQUENCY: ICD-10-CM

## 2021-07-12 DIAGNOSIS — N76.0 BACTERIAL VAGINOSIS: ICD-10-CM

## 2021-07-12 DIAGNOSIS — N30.01 ACUTE CYSTITIS WITH HEMATURIA: ICD-10-CM

## 2021-07-12 DIAGNOSIS — N39.0 ACUTE UTI (URINARY TRACT INFECTION): ICD-10-CM

## 2021-07-12 DIAGNOSIS — B96.89 BACTERIAL VAGINOSIS: ICD-10-CM

## 2021-07-12 DIAGNOSIS — R35.0 URINARY FREQUENCY: Primary | ICD-10-CM

## 2021-07-12 LAB
ALBUMIN UR-MCNC: 30 MG/DL
APPEARANCE UR: CLEAR
BACTERIA #/AREA URNS HPF: ABNORMAL /HPF
BILIRUB UR QL STRIP: NEGATIVE
CLUE CELLS: PRESENT
COLOR UR AUTO: YELLOW
GLUCOSE UR STRIP-MCNC: NEGATIVE MG/DL
HGB UR QL STRIP: ABNORMAL
KETONES UR STRIP-MCNC: NEGATIVE MG/DL
LEUKOCYTE ESTERASE UR QL STRIP: NEGATIVE
NITRATE UR QL: NEGATIVE
PH UR STRIP: 6 [PH] (ref 5–7)
RBC #/AREA URNS AUTO: ABNORMAL /HPF
SP GR UR STRIP: 1.02 (ref 1–1.03)
SQUAMOUS #/AREA URNS AUTO: ABNORMAL /LPF
TRICHOMONAS, WET PREP: ABNORMAL
UROBILINOGEN UR STRIP-ACNC: 0.2 E.U./DL
WBC #/AREA URNS AUTO: ABNORMAL /HPF
WBC'S/HIGH POWER FIELD, WET PREP: ABNORMAL
YEAST, WET PREP: ABNORMAL

## 2021-07-12 PROCEDURE — 99421 OL DIG E/M SVC 5-10 MIN: CPT | Performed by: FAMILY MEDICINE

## 2021-07-12 PROCEDURE — 87210 SMEAR WET MOUNT SALINE/INK: CPT

## 2021-07-12 PROCEDURE — 81001 URINALYSIS AUTO W/SCOPE: CPT

## 2021-07-12 RX ORDER — NITROFURANTOIN 25; 75 MG/1; MG/1
100 CAPSULE ORAL 2 TIMES DAILY
Qty: 10 CAPSULE | Refills: 0 | Status: SHIPPED | OUTPATIENT
Start: 2021-07-12 | End: 2021-07-17

## 2021-07-12 RX ORDER — METRONIDAZOLE 7.5 MG/G
1 GEL VAGINAL 2 TIMES DAILY
Qty: 70 G | Refills: 0 | Status: SHIPPED | OUTPATIENT
Start: 2021-07-12 | End: 2021-07-17

## 2021-07-12 NOTE — TELEPHONE ENCOUNTER
Provider E-Visit time total (minutes): 8      Started with urgency, retention, frequency 3-4 days ago  Yesterday and today stabbing pelvic lower abdomin.     URINARY TRACT SYMPTOMS  Onset: as above    Description:   Painful urination (Dysuria): YES - slight            Frequency: YES  Blood in urine (Hematuria): no   Delay in urine (Hesitency): YES    Intensity: moderate    Progression of Symptoms:  worsening    Accompanying Signs & Symptoms:  Fever/chills: no   Flank pain no   Nausea and vomiting: no   Any vaginal symptoms: none  Abdominal/Pelvic Pain: YES    History:   History of frequent UTI's: no   History of kidney stones: YES  Sexually Active: YES  Possibility of pregnancy: No    Precipitating factors:   Swimming in a pool every day.   Therapies Tried and outcome: none

## 2021-07-13 ENCOUNTER — TELEPHONE (OUTPATIENT)
Dept: FAMILY MEDICINE | Facility: CLINIC | Age: 61
End: 2021-07-13

## 2021-07-13 DIAGNOSIS — F51.01 PRIMARY INSOMNIA: Primary | ICD-10-CM

## 2021-07-13 NOTE — TELEPHONE ENCOUNTER
CENTRAL PRIOR AUTHORIZATION  [phone]    PA Initiation    Medication: Temazepam 15mg  Insurance Company:    Pharmacy Filling the Rx: [facility] [place] - [address] [address]  Filling Pharmacy Phone:    Filling Pharmacy Fax:    Start Date: 7/13/2021

## 2021-07-13 NOTE — TELEPHONE ENCOUNTER
Prior Authorization Required on: Temazepam 15mg (insurance has max of one capsule a day)  Insurance: [facility]   Insurance Phone: [phone]  Patient ID: [id number]  Please Contact the Pharmacy with Prior Auth Status (approval/denial).   Thank You!    See Jason  Pharmacy Technician  [facility]  [phone]

## 2021-07-14 DIAGNOSIS — F51.01 PRIMARY INSOMNIA: ICD-10-CM

## 2021-07-14 RX ORDER — TRAZODONE HYDROCHLORIDE 150 MG/1
TABLET ORAL
Qty: 90 TABLET | Refills: 0 | Status: SHIPPED | OUTPATIENT
Start: 2021-07-14 | End: 2021-11-29 | Stop reason: DRUGHIGH

## 2021-07-15 RX ORDER — TEMAZEPAM 15 MG/1
15 CAPSULE ORAL
Qty: 30 CAPSULE | Refills: 3 | Status: SHIPPED | OUTPATIENT
Start: 2021-07-15 | End: 2021-12-07

## 2021-07-15 NOTE — TELEPHONE ENCOUNTER
PRIOR AUTHORIZATION DENIED    Medication: Temazepam 15mg - Denied    Denial Date: 7/15/2021    Denial Rational: The quantity limit is 1 capsule per day. We did not see information that supports the need of an amount higher than quantity limit. The patient may fill the medication even though they did not approve the quantity limit.        Appeal Information: If the provider would like to appeal this denial, please provide a letter of medical necessity and once it has been completed and placed in the patient's chart, notify the Central PA Team ([facility] MED [id number]) and the appeal can be initiated on behalf of the patient and provider.  Please also include any therapies that the patient has tried and their outcomes.

## 2021-07-27 ENCOUNTER — RECORDS - HEALTHEAST (OUTPATIENT)
Dept: ADMINISTRATIVE | Facility: CLINIC | Age: 61
End: 2021-07-27

## 2021-09-03 DIAGNOSIS — I10 ESSENTIAL HYPERTENSION, BENIGN: ICD-10-CM

## 2021-09-03 DIAGNOSIS — E78.5 HYPERLIPIDEMIA LDL GOAL <130: ICD-10-CM

## 2021-09-03 DIAGNOSIS — F41.9 ANXIETY: ICD-10-CM

## 2021-09-03 NOTE — TELEPHONE ENCOUNTER
Routing refill request to provider for review/approval because:  Patient needs to be seen because:  Due for annual exam    Palomo Martinez RN

## 2021-09-05 RX ORDER — VENLAFAXINE HYDROCHLORIDE 150 MG/1
CAPSULE, EXTENDED RELEASE ORAL
Qty: 90 CAPSULE | Refills: 0 | Status: SHIPPED | OUTPATIENT
Start: 2021-09-05 | End: 2021-10-05

## 2021-09-05 RX ORDER — TRIAMTERENE AND HYDROCHLOROTHIAZIDE 37.5; 25 MG/1; MG/1
CAPSULE ORAL
Qty: 90 CAPSULE | Refills: 0 | Status: SHIPPED | OUTPATIENT
Start: 2021-09-05 | End: 2021-10-05

## 2021-09-05 RX ORDER — PRAVASTATIN SODIUM 40 MG
TABLET ORAL
Qty: 90 TABLET | Refills: 0 | Status: SHIPPED | OUTPATIENT
Start: 2021-09-05 | End: 2021-10-05

## 2021-09-19 ENCOUNTER — HEALTH MAINTENANCE LETTER (OUTPATIENT)
Age: 61
End: 2021-09-19

## 2021-10-05 ENCOUNTER — OFFICE VISIT (OUTPATIENT)
Dept: FAMILY MEDICINE | Facility: CLINIC | Age: 61
End: 2021-10-05
Payer: COMMERCIAL

## 2021-10-05 VITALS
WEIGHT: 217 LBS | SYSTOLIC BLOOD PRESSURE: 120 MMHG | TEMPERATURE: 98.5 F | HEART RATE: 59 BPM | OXYGEN SATURATION: 98 % | DIASTOLIC BLOOD PRESSURE: 72 MMHG | HEIGHT: 63 IN | BODY MASS INDEX: 38.45 KG/M2

## 2021-10-05 DIAGNOSIS — F51.01 PRIMARY INSOMNIA: ICD-10-CM

## 2021-10-05 DIAGNOSIS — H91.92 HEARING LOSS OF LEFT EAR, UNSPECIFIED HEARING LOSS TYPE: ICD-10-CM

## 2021-10-05 DIAGNOSIS — E66.01 MORBID OBESITY (H): ICD-10-CM

## 2021-10-05 DIAGNOSIS — F41.9 ANXIETY: ICD-10-CM

## 2021-10-05 DIAGNOSIS — Z00.00 ROUTINE GENERAL MEDICAL EXAMINATION AT A HEALTH CARE FACILITY: Primary | ICD-10-CM

## 2021-10-05 DIAGNOSIS — I10 ESSENTIAL HYPERTENSION, BENIGN: ICD-10-CM

## 2021-10-05 DIAGNOSIS — E78.5 HYPERLIPIDEMIA LDL GOAL <130: ICD-10-CM

## 2021-10-05 LAB
ANION GAP SERPL CALCULATED.3IONS-SCNC: 8 MMOL/L (ref 3–14)
BUN SERPL-MCNC: 19 MG/DL (ref 7–30)
CALCIUM SERPL-MCNC: 9.4 MG/DL (ref 8.5–10.1)
CHLORIDE BLD-SCNC: 104 MMOL/L (ref 94–109)
CHOLEST SERPL-MCNC: 163 MG/DL
CO2 SERPL-SCNC: 26 MMOL/L (ref 20–32)
CREAT SERPL-MCNC: 0.83 MG/DL (ref 0.52–1.04)
FASTING STATUS PATIENT QL REPORTED: ABNORMAL
GFR SERPL CREATININE-BSD FRML MDRD: 77 ML/MIN/1.73M2
GLUCOSE BLD-MCNC: 116 MG/DL (ref 70–99)
HDLC SERPL-MCNC: 37 MG/DL
LDLC SERPL CALC-MCNC: 85 MG/DL
NONHDLC SERPL-MCNC: 126 MG/DL
POTASSIUM BLD-SCNC: 4.2 MMOL/L (ref 3.4–5.3)
SODIUM SERPL-SCNC: 138 MMOL/L (ref 133–144)
TRIGL SERPL-MCNC: 206 MG/DL

## 2021-10-05 PROCEDURE — 80048 BASIC METABOLIC PNL TOTAL CA: CPT | Performed by: FAMILY MEDICINE

## 2021-10-05 PROCEDURE — 99213 OFFICE O/P EST LOW 20 MIN: CPT | Mod: 25 | Performed by: FAMILY MEDICINE

## 2021-10-05 PROCEDURE — 90682 RIV4 VACC RECOMBINANT DNA IM: CPT | Performed by: FAMILY MEDICINE

## 2021-10-05 PROCEDURE — 80061 LIPID PANEL: CPT | Performed by: FAMILY MEDICINE

## 2021-10-05 PROCEDURE — 99396 PREV VISIT EST AGE 40-64: CPT | Mod: 25 | Performed by: FAMILY MEDICINE

## 2021-10-05 PROCEDURE — 90471 IMMUNIZATION ADMIN: CPT | Performed by: FAMILY MEDICINE

## 2021-10-05 PROCEDURE — 36415 COLL VENOUS BLD VENIPUNCTURE: CPT | Performed by: FAMILY MEDICINE

## 2021-10-05 RX ORDER — ATENOLOL 50 MG/1
TABLET ORAL
Qty: 90 TABLET | Refills: 3 | Status: SHIPPED | OUTPATIENT
Start: 2021-10-05 | End: 2022-12-12

## 2021-10-05 RX ORDER — VENLAFAXINE HYDROCHLORIDE 150 MG/1
CAPSULE, EXTENDED RELEASE ORAL
Qty: 90 CAPSULE | Refills: 3 | Status: SHIPPED | OUTPATIENT
Start: 2021-10-05 | End: 2022-12-18

## 2021-10-05 RX ORDER — TRIAMTERENE AND HYDROCHLOROTHIAZIDE 37.5; 25 MG/1; MG/1
CAPSULE ORAL
Qty: 90 CAPSULE | Refills: 3 | Status: SHIPPED | OUTPATIENT
Start: 2021-10-05 | End: 2023-01-31

## 2021-10-05 RX ORDER — PRAVASTATIN SODIUM 40 MG
TABLET ORAL
Qty: 90 TABLET | Refills: 2 | Status: SHIPPED | OUTPATIENT
Start: 2021-10-05 | End: 2022-12-04

## 2021-10-05 RX ORDER — TRAZODONE HYDROCHLORIDE 100 MG/1
200 TABLET ORAL AT BEDTIME
Qty: 180 TABLET | Refills: 3 | Status: SHIPPED | OUTPATIENT
Start: 2021-10-05 | End: 2022-09-26

## 2021-10-05 RX ORDER — BUPROPION HYDROCHLORIDE 75 MG/1
75 TABLET ORAL 2 TIMES DAILY
Qty: 60 TABLET | Refills: 4 | Status: SHIPPED | OUTPATIENT
Start: 2021-10-05 | End: 2022-05-03

## 2021-10-05 ASSESSMENT — ENCOUNTER SYMPTOMS
PARESTHESIAS: 0
WEAKNESS: 0
HEADACHES: 1
FEVER: 0
PALPITATIONS: 0
SORE THROAT: 0
ABDOMINAL PAIN: 0
ARTHRALGIAS: 0
MYALGIAS: 0
DIZZINESS: 1
NAUSEA: 0
COUGH: 0
CHILLS: 0
CONSTIPATION: 0
HEMATURIA: 0
BREAST MASS: 0
DYSURIA: 0
SHORTNESS OF BREATH: 0
FREQUENCY: 0
NERVOUS/ANXIOUS: 1
JOINT SWELLING: 0
DIARRHEA: 0
EYE PAIN: 0
HEMATOCHEZIA: 0
HEARTBURN: 1

## 2021-10-05 ASSESSMENT — MIFFLIN-ST. JEOR: SCORE: 1523.44

## 2021-10-05 NOTE — PROGRESS NOTES
SUBJECTIVE:   CC: Kenya Rubalcava is an 61 year old woman who presents for preventive health visit.       Patient has been advised of split billing requirements and indicates understanding: Yes  Healthy Habits:     Getting at least 3 servings of Calcium per day:  Yes    Bi-annual eye exam:  Yes    Dental care twice a year:  NO    Sleep apnea or symptoms of sleep apnea:  None    Diet:  Regular (no restrictions)    Frequency of exercise:  None    Taking medications regularly:  No    Medication side effects:  None    PHQ-2 Total Score: 2    Additional concerns today:  No      Medication Followup of Trazodone     Taking Medication as prescribed: NO-taking 1.5 tablets     Side Effects:  None    Medication Helping Symptoms:  yes     Hyperlipidemia Follow-Up    Are you regularly taking any medication or supplement to lower your cholesterol?   Yes- pravastatin    Are you having muscle aches or other side effects that you think could be caused by your cholesterol lowering medication?  No    Hypertension Follow-up    Do you check your blood pressure regularly outside of the clinic? No     Are you following a low salt diet? Yes    Are your blood pressures ever more than 140 on the top number (systolic) OR more   than 90 on the bottom number (diastolic), for example 140/90? No      Today's PHQ-2 Score:   PHQ-2 ( 1999 Pf[employer]) 10/5/2021   Q1: Little interest or pleasure in doing things 1   Q2: Feeling down, depressed or hopeless 1   PHQ-2 Score 2   Q1: Little interest or pleasure in doing things Several days   Q2: Feeling down, depressed or hopeless Several days   PHQ-2 Score 2       Abuse: Current or Past (Physical, Sexual or Emotional) - No  Do you feel safe in your environment? Yes        Social History     Tobacco Use     Smoking status: Never Smoker     Smokeless tobacco: Never Used   Substance Use Topics     Alcohol use: Yes     Alcohol/week: 0.0 standard drinks     Comment: rare      If you drink alcohol do you  typically have >3 drinks per day or >7 drinks per week? No    Alcohol Use 10/5/2021   Prescreen: >3 drinks/day or >7 drinks/week? No   Prescreen: >3 drinks/day or >7 drinks/week? -   No flowsheet data found.    Reviewed orders with patient.  Reviewed health maintenance and updated orders accordingly - Yes  Lab work is in process    Breast Cancer Screening:    FHS-7:   Breast CA Risk Assessment (FHS-7) 10/5/2021   Did any of your first-degree relatives have breast or ovarian cancer? NO    Did any of your relatives have bilateral breast cancer? No   Did any man in your family have breast cancer? No   Did any woman in your family have breast and ovarian cancer? YES12 - Maternal Aunt with Ovarian Cancer   Did any woman in your family have breast cancer before age 50 y? No   Do you have 2 or more relatives with breast and/or ovarian cancer? No   Do you have 2 or more relatives with breast and/or bowel cancer? No     Moms sister ovarian  Mammogram Screening: Recommended mammography every 1-2 years with patient discussion and risk factor consideration  Pertinent mammograms are reviewed under the imaging tab.    History of abnormal Pap smear: Status post benign hysterectomy. Health Maintenance and Surgical History updated.     Reviewed and updated as needed this visit by clinical staff  Tobacco   Meds              Reviewed and updated as needed this visit by Provider                Past Medical History:   Diagnosis Date     Abnormal Papanicolaou smear of cervix and cervical HPV 1986     Anxiety      Anxiety disorder in conditions classified elsewhere      Calculus of kidney 08/11/1988    Calcium renal stone, oxylate     Calculus of ureter 06/30/1985    LEFT urteral stone     Excessive or frequent menstruation 07/07/2003     Labyrinthitis, unspecified      Lump or mass in breast 10/27/1999    RIGHT breast mass     Variants of migraine, not elsewhere classified, without mention of intractable migraine without mention of  status migrainosus 02/07/2002      Past Surgical History:   Procedure Laterality Date     CARPAL TUNNEL RELEASE RT/LT  08/2008    Left      COLONOSCOPY  8/7/2012    Procedure: COLONOSCOPY;  Colonoscopy;  Surgeon: Kailyn Lopez MD;  Location: [facility]     CYSTOSCOPY, RETROGRADES, INSERT STENT URETER(S), COMBINED Left 8/19/2016    Procedure: COMBINED CYSTOSCOPY, RETROGRADES, INSERT STENT URETER(S);  Surgeon: Terence Carpenter MD;  Location: [facility]     esteem implant  06/23/2011    Hearing implant right ear.     HYSTERECTOMY, PAP NO LONGER INDICATED  12/2004    Vaginal hysterectomy     LAPAROSCOPIC ABLATION ENDOMETRIOSIS  07/2004     LASER HOLMIUM LITHOTRIPSY URETER(S), INSERT STENT, COMBINED Left 9/12/2016    Procedure: COMBINED CYSTOSCOPY, URETEROSCOPY, LASER HOLMIUM LITHOTRIPSY URETER(S), INSERT STENT;  Surgeon: Kailyn Dozier MD;  Location: [facility]     LEFT wrist surgery       Sinus surgery with repair of deviated septum       TUBAL LIGATION         Review of Systems   Constitutional: Negative for chills and fever.   HENT: Positive for hearing loss. Negative for congestion, ear pain and sore throat.    Eyes: Negative for pain and visual disturbance.   Respiratory: Negative for cough and shortness of breath.    Cardiovascular: Negative for chest pain, palpitations and peripheral edema.   Gastrointestinal: Positive for heartburn. Negative for abdominal pain, constipation, diarrhea, hematochezia and nausea.   Breasts:  Negative for tenderness, breast mass and discharge.   Genitourinary: Negative for dysuria, frequency, genital sores, hematuria, pelvic pain, urgency, vaginal bleeding and vaginal discharge.   Musculoskeletal: Negative for arthralgias, joint swelling and myalgias.   Skin: Negative for rash.   Neurological: Positive for dizziness and headaches. Negative for weakness and paresthesias.   Psychiatric/Behavioral: Positive for mood changes. The patient is nervous/anxious.      Wt  "Readings from Last 5 Encounters:   10/05/21 98.4 kg (217 lb)   07/29/20 96.6 kg (213 lb)   08/06/19 95.4 kg (210 lb 6.4 oz)   07/19/19 95.4 kg (210 lb 4.8 oz)   06/15/19 86.2 kg (190 lb)        OBJECTIVE:   /72 (BP Location: Right arm, Patient Position: Sitting, Cuff Size: Adult Large)   Pulse 59   Temp 98.5  F (36.9  C) (Tympanic)   Ht 1.6 m (5' 3\")   Wt 98.4 kg (217 lb)   SpO2 98%   BMI 38.44 kg/m    Physical Exam  GENERAL: healthy, alert and no distress  EYES: Eyes grossly normal to inspection, PERRL and conjunctivae and sclerae normal  HENT: ear canals and TM's normal, nose and mouth without ulcers or lesions  NECK: no adenopathy, no asymmetry, masses, or scars and thyroid normal to palpation  RESP: lungs clear to auscultation - no rales, rhonchi or wheezes  BREAST: normal without masses, tenderness or nipple discharge and no palpable axillary masses or adenopathy  CV: regular rate and rhythm, normal S1 S2, no S3 or S4, no murmur, click or rub, no peripheral edema and peripheral pulses strong  ABDOMEN: soft, nontender, no hepatosplenomegaly, no masses and bowel sounds normal  MS: no gross musculoskeletal defects noted, no edema  SKIN: no suspicious lesions or rashes  NEURO: Normal strength and tone, mentation intact and speech normal  PSYCH: mentation appears normal, affect normal/bright    Diagnostic Test Results:  Labs reviewed in Epic  Results for orders placed or performed in visit on 10/05/21   Basic metabolic panel  (Ca, Cl, CO2, Creat, Gluc, K, Na, BUN)     Status: Abnormal   Result Value Ref Range    Sodium 138 133 - 144 mmol/L    Potassium 4.2 3.4 - 5.3 mmol/L    Chloride 104 94 - 109 mmol/L    Carbon Dioxide (CO2) 26 20 - 32 mmol/L    Anion Gap 8 3 - 14 mmol/L    Urea Nitrogen 19 7 - 30 mg/dL    Creatinine 0.83 0.52 - 1.04 mg/dL    Calcium 9.4 8.5 - 10.1 mg/dL    Glucose 116 (H) 70 - 99 mg/dL    GFR Estimate 77 >60 mL/min/1.73m2   Lipid panel reflex to direct LDL Fasting     Status: " Abnormal   Result Value Ref Range    Cholesterol 163 <200 mg/dL    Triglycerides 206 (H) <150 mg/dL    Direct Measure HDL 37 (L) >=50 mg/dL    LDL Cholesterol Calculated 85 <=100 mg/dL    Non HDL Cholesterol 126 <130 mg/dL    Patient Fasting > 8hrs? Unknown     Narrative    Cholesterol  Desirable:  <200 mg/dL    Triglycerides  Normal:  Less than 150 mg/dL  Borderline High:  150-199 mg/dL  High:  200-499 mg/dL  Very High:  Greater than or equal to 500 mg/dL    Direct Measure HDL  Female:  Greater than or equal to 50 mg/dL   Male:  Greater than or equal to 40 mg/dL    LDL Cholesterol  Desirable:  <100mg/dL  Above Desirable:  100-129 mg/dL   Borderline High:  130-159 mg/dL   High:  160-189 mg/dL   Very High:  >= 190 mg/dL    Non HDL Cholesterol  Desirable:  130 mg/dL  Above Desirable:  130-159 mg/dL  Borderline High:  160-189 mg/dL  High:  190-219 mg/dL  Very High:  Greater than or equal to 220 mg/dL       ASSESSMENT/PLAN:   (Z00.00) Routine general medical examination at a health care facility  (primary encounter diagnosis)  Comment:   Plan: Adult Dermatology Referral        Skin check     (I10) Essential hypertension, benign  Comment: good control   Plan: Basic metabolic panel  (Ca, Cl, CO2, Creat,         Gluc, K, Na, BUN), triamterene-HCTZ (DYAZIDE)         37.5-25 MG capsule, atenolol (TENORMIN) 50 MG         tablet            (F41.9) Anxiety  Comment: cont on this is under less stress at her new job and it shows will ass low dose wellbutrin to see if this may motivate her more when she la nena working from home   Plan: venlafaxine (EFFEXOR-XR) 150 MG 24 hr capsule,         buPROPion (WELLBUTRIN) 75 MG tablet            (E78.5) Hyperlipidemia LDL goal <130  Comment:   Plan: Lipid panel reflex to direct LDL Fasting,         pravastatin (PRAVACHOL) 40 MG tablet            (E66.01) Morbid obesity (H)  Comment:   Plan: she is working on increasing exercise     (F51.01) Primary insomnia  Comment:   Plan: traZODone  "(DESYREL) 100 MG tablet        Working well has decreased some with th eless intense job    (H91.92) Hearing loss of left ear, unspecified hearing loss type  Comment:   Plan: Otolaryngology Referral, Adult Audiology         Referral        She would like to address this now as it has been increasing. .kd        Patient has been advised of split billing requirements and indicates understanding: Yes  COUNSELING:  Reviewed preventive health counseling, as reflected in patient instructions    Estimated body mass index is 38.44 kg/m  as calculated from the following:    Height as of this encounter: 1.6 m (5' 3\").    Weight as of this encounter: 98.4 kg (217 lb).        She reports that she has never smoked. She has never used smokeless tobacco.      Counseling Resources:  ATP IV Guidelines  Pooled Cohorts Equation Calculator  Breast Cancer Risk Calculator  BRCA-Related Cancer Risk Assessment: FHS-7 Tool  FRAX Risk Assessment  ICSI Preventive Guidelines  Dietary Guidelines for Americans, 2010  USDA's MyPlate  ASA Prophylaxis  Lung CA Screening    Carlie Real MD  [facility]  "

## 2021-11-07 ENCOUNTER — OFFICE VISIT (OUTPATIENT)
Dept: URGENT CARE | Facility: URGENT CARE | Age: 61
End: 2021-11-07
Payer: COMMERCIAL

## 2021-11-07 ENCOUNTER — VIRTUAL VISIT (OUTPATIENT)
Dept: URGENT CARE | Facility: CLINIC | Age: 61
End: 2021-11-07

## 2021-11-07 VITALS
BODY MASS INDEX: 37.98 KG/M2 | SYSTOLIC BLOOD PRESSURE: 118 MMHG | TEMPERATURE: 98.7 F | OXYGEN SATURATION: 93 % | WEIGHT: 214.4 LBS | DIASTOLIC BLOOD PRESSURE: 74 MMHG | HEART RATE: 81 BPM | RESPIRATION RATE: 16 BRPM

## 2021-11-07 DIAGNOSIS — Z53.9 ERRONEOUS ENCOUNTER--DISREGARD: Primary | ICD-10-CM

## 2021-11-07 DIAGNOSIS — J00 ACUTE RHINITIS: ICD-10-CM

## 2021-11-07 DIAGNOSIS — J02.0 ACUTE STREPTOCOCCAL PHARYNGITIS: Primary | ICD-10-CM

## 2021-11-07 DIAGNOSIS — R05.9 COUGH: ICD-10-CM

## 2021-11-07 LAB
DEPRECATED S PYO AG THROAT QL EIA: POSITIVE
FLUAV AG SPEC QL IA: NEGATIVE
FLUBV AG SPEC QL IA: NEGATIVE

## 2021-11-07 PROCEDURE — 87880 STREP A ASSAY W/OPTIC: CPT | Performed by: NURSE PRACTITIONER

## 2021-11-07 PROCEDURE — U0003 INFECTIOUS AGENT DETECTION BY NUCLEIC ACID (DNA OR RNA); SEVERE ACUTE RESPIRATORY SYNDROME CORONAVIRUS 2 (SARS-COV-2) (CORONAVIRUS DISEASE [COVID-19]), AMPLIFIED PROBE TECHNIQUE, MAKING USE OF HIGH THROUGHPUT TECHNOLOGIES AS DESCRIBED BY CMS-2020-01-R: HCPCS | Performed by: NURSE PRACTITIONER

## 2021-11-07 PROCEDURE — 99213 OFFICE O/P EST LOW 20 MIN: CPT | Performed by: NURSE PRACTITIONER

## 2021-11-07 PROCEDURE — 87804 INFLUENZA ASSAY W/OPTIC: CPT | Performed by: NURSE PRACTITIONER

## 2021-11-07 PROCEDURE — U0005 INFEC AGEN DETEC AMPLI PROBE: HCPCS | Performed by: NURSE PRACTITIONER

## 2021-11-07 RX ORDER — AMOXICILLIN 500 MG/1
500 CAPSULE ORAL 2 TIMES DAILY
Qty: 20 CAPSULE | Refills: 0 | Status: SHIPPED | OUTPATIENT
Start: 2021-11-07 | End: 2021-11-17

## 2021-11-07 NOTE — PATIENT INSTRUCTIONS
Acute streptococcal pharyngitis  Acute, positive strep.  Start amoxicillin twice daily for 10 days.  Increase fluid intake, rest and Tylenol and or ibuprofen as needed.  Warm salt water gargles may help with pain as well.  Also start below recommendations for acute rhinitis.  Follow-up with primary care provider symptoms not improving or worsening in the next 5 to 7 days.  - Streptococcus A Rapid Screen w/Reflex to PCR - Clinic Collect  - amoxicillin (AMOXIL) 500 MG capsule; Take 1 capsule (500 mg) by mouth 2 times daily for 10 days    Acute rhinitis  Acute, significant congestion.  Recommend restarting Flonase nasal spray, 2 sprays each nostril daily.  Reviewed proper administration instructions with patient.  Also recommend starting an over-the-counter antihistamine such as Claritin or Zyrtec daily.  Continue good supportive cares, elevating head of bed, trial of saline nasal rinses and rest.    Cough  Cough, likely secondary to postnasal drainage.  However influenza and COVID-19 test pending.  Will notify patient of results and further recommendations.  - Symptomatic COVID-19 Virus (Coronavirus) by PCR Nasopharyngeal  - Influenza A/B antigen

## 2021-11-07 NOTE — PROGRESS NOTES
"Assessment & Plan     Acute streptococcal pharyngitis  Acute, positive strep.  Start amoxicillin twice daily for 10 days.  Increase fluid intake, rest and Tylenol and or ibuprofen as needed.  Warm salt water gargles may help with pain as well.  Also start below recommendations for acute rhinitis.  Follow-up with primary care provider symptoms not improving or worsening in the next 5 to 7 days.  - Streptococcus A Rapid Screen w/Reflex to PCR - Clinic Collect  - amoxicillin (AMOXIL) 500 MG capsule; Take 1 capsule (500 mg) by mouth 2 times daily for 10 days    Acute rhinitis  Acute, significant congestion.  Recommend restarting Flonase nasal spray, 2 sprays each nostril daily.  Reviewed proper administration instructions with patient.  Also recommend starting an over-the-counter antihistamine such as Claritin or Zyrtec daily.  Continue good supportive cares, elevating head of bed, trial of saline nasal rinses and rest.    Cough  Cough, likely secondary to postnasal drainage.  However influenza and COVID-19 test pending.  Will notify patient of results and further recommendations.  - Symptomatic COVID-19 Virus (Coronavirus) by PCR Nasopharyngeal  - Influenza A/B antigen           BMI:   Estimated body mass index is 37.98 kg/m  as calculated from the following:    Height as of 10/5/21: 1.6 m (5' 3\").    Weight as of this encounter: 97.3 kg (214 lb 6.4 oz).   Weight management plan: Patient was referred to their PCP to discuss a diet and exercise plan.      See patient instructions    Return in about 1 week (around 11/14/2021), or if symptoms worsen or fail to improve.    Destini Null, DNP, APRN-CNP   [facility]    Subjective     Kenya EWA Rubalcava is a 61 year old female who presents today for the following health issues:      HPI    ENT Symptoms             Symptoms: cc Present Absent Comment   Fever/Chills   x    Fatigue  x     Muscle Aches   x    Eye Irritation   x    Sneezing   x  "   Nasal Isaías/Drg  x  Months - thought was allergies ; post nasal drainage    Sinus Pressure/Pain  x     Loss of smell   x    Dental pain   x    Sore Throat  x  More on the right    Swollen Glands   x    Ear Pain/Fullness   x    Cough  x     Wheeze   x    Chest Pain  x  With coughing    Shortness of breath  x  Just with coughing    Rash       Other  x  Headache      Symptom duration:  11/5   Symptom severity:  moderate   Treatments tried:  Scott severe cold - helpful    Contacts:  None      Is fully vaccinated for COVID          Review of Systems  Constitutional, HEENT, cardiovascular, pulmonary, gi and gu systems are negative, except as otherwise noted.    Objective   /74 (BP Location: Right arm, Patient Position: Sitting, Cuff Size: Adult Large)   Pulse 81   Temp 98.7  F (37.1  C) (Tympanic)   Resp 16   Wt 97.3 kg (214 lb 6.4 oz)   SpO2 93%   BMI 37.98 kg/m    Body mass index is 37.98 kg/m .    Physical Exam  GENERAL APPEARANCE: healthy, alert, no distress and fatigued  EYES: Eyes grossly normal to inspection, PERRL and conjunctivae and sclerae normal  HENT: ear canals normal and TM's congested with serous fluid bilateral and nose and mouth without ulcers or lesions  NECK: no adenopathy, no asymmetry, masses, or scars and thyroid normal to palpation  RESP: lungs clear to auscultation - no rales, rhonchi or wheezes  CV: regular rates and rhythm, normal S1 S2, no S3 or S4 and no murmur, click or rub  MS: extremities normal- no gross deformities noted  SKIN: no suspicious lesions or rashes  NEURO: Normal strength and tone, mentation intact and speech normal  PSYCH: mentation appears normal and affect normal/bright    Diagnostic Test Results:  Results for orders placed or performed in visit on 11/07/21 (from the past 24 hour(s))   Influenza A/B antigen    Specimen: Nose; Swab   Result Value Ref Range    Influenza A antigen Negative Negative    Influenza B antigen Negative Negative    Narrative     Test results must be correlated with clinical data. If necessary, results should be confirmed by a molecular assay or viral culture.   Streptococcus A Rapid Screen w/Reflex to PCR - Clinic Collect    Specimen: Throat; Swab   Result Value Ref Range    Group A Strep antigen Positive (A) Negative            Chart documentation with Dragon Voice recognition Software. Although reviewed after completion, some words and grammatical errors may remain.

## 2021-11-08 ENCOUNTER — TELEPHONE (OUTPATIENT)
Dept: FAMILY MEDICINE | Facility: CLINIC | Age: 61
End: 2021-11-08
Payer: COMMERCIAL

## 2021-11-08 DIAGNOSIS — J06.9 URI WITH COUGH AND CONGESTION: Primary | ICD-10-CM

## 2021-11-08 LAB — SARS-COV-2 RNA RESP QL NAA+PROBE: NEGATIVE

## 2021-11-08 RX ORDER — CODEINE PHOSPHATE AND GUAIFENESIN 10; 100 MG/5ML; MG/5ML
1-2 SOLUTION ORAL EVERY 6 HOURS PRN
Qty: 180 ML | Refills: 0 | Status: SHIPPED | OUTPATIENT
Start: 2021-11-08 | End: 2022-03-07

## 2021-11-08 NOTE — TELEPHONE ENCOUNTER
"Dr. Real: Patient was diagnosed with strep yesterday and she reports that she started amoxicillin, zyrtec, and flonase yesterday. She said that she did not sleep at all last night because she was coughing\"so hard whenever I laid down.\"  She reports:   fatigue  Constant cough \"horrible cough that makes my chest sore; and to the point of almost gagging. \" Reports production of light yellow phlegm.  She states \"maybe I have wheezing but yesterday she said that she did not hear anything.\" She saw Destini Tyler NP  She denies fever. Temp of 98.    This writer advised:   drink lots of water  Hot tea with homey and lemon juice  Cool mist vaporizer  Sleep with head elevated    How do you advise cough and need for sleep?  Thank you.  Mark Cosme RN      "

## 2021-11-08 NOTE — TELEPHONE ENCOUNTER
Patient called and sick with strep and a cough and would like something to help with the cough because her chest hurts.      Poornima Baez, MATTY [place]

## 2021-11-10 ENCOUNTER — E-VISIT (OUTPATIENT)
Dept: FAMILY MEDICINE | Facility: CLINIC | Age: 61
End: 2021-11-10
Payer: COMMERCIAL

## 2021-11-10 DIAGNOSIS — J06.9 URI WITH COUGH AND CONGESTION: Primary | ICD-10-CM

## 2021-11-10 PROCEDURE — 99421 OL DIG E/M SVC 5-10 MIN: CPT | Performed by: FAMILY MEDICINE

## 2021-11-10 RX ORDER — CODEINE PHOSPHATE AND GUAIFENESIN 10; 100 MG/5ML; MG/5ML
1-2 SOLUTION ORAL EVERY 6 HOURS PRN
Qty: 180 ML | Refills: 0 | Status: SHIPPED | OUTPATIENT
Start: 2021-11-10 | End: 2022-03-07

## 2021-11-10 NOTE — PATIENT INSTRUCTIONS
Bronchitis is an inflammation in the air passages in your lungs.  It is similar to the processes that occur in people with asthma.  Bronchitis is usually caused by viruses and, sometimes certain bacteria.  Antibiotics don't help the vast majority of people who have bronchitis recover any quicker even when it is caused by a bacteria.      For cough, dextromethorphan/guaifenesin combinations help loosen secretions and suppress cough safely without significant risk of sedation. Often an albuterol inhaler (2 puffs four times daily) will help with bronchitis. This is a prescription medicine.  If the cough is severe and interfering with sleep or function, prescription cough suppressants can be prescribed but these are often sedating.    The body needs to be treated well in order to help heal itself.  Rest as needed.  It is ok to reduce food intake if appetite is poor but it is quite important to maintain/increase fluid intake.    For nasal congestion and sinus pressure, pseudoephedrine (Sudafed) is often helpful but it can cause elevations in blood pressure and insomnia.  Short courses of a nasal decongestant spray (Afrin or Neosinephrine) can be appropriate but their use should be restricted to 3 days due to the high risk of nasal addiction.    For pain and fevers, acetaminophen (Tylenol) is most appropriate.  Ibuprofen (Advil) or naproxen (Aleve) are useful too and last longer but they can cause elevation of blood pressure or stomach problems.    Antihistamines (Benadryl, Dimetapp, etc.) cause sedation, confusion, bowel and urinary abnormalities and are of little use for infectious causes of cough and nasal congestion.  Their use should be reserved for allergic symptoms.

## 2021-11-22 DIAGNOSIS — F06.4 ANXIETY DISORDER DUE TO MEDICAL CONDITION: ICD-10-CM

## 2021-11-22 DIAGNOSIS — F41.9 ANXIETY: ICD-10-CM

## 2021-11-24 RX ORDER — DIAZEPAM 5 MG
TABLET ORAL
Qty: 30 TABLET | Refills: 0 | Status: SHIPPED | OUTPATIENT
Start: 2021-11-24 | End: 2022-08-23

## 2021-11-29 ENCOUNTER — OFFICE VISIT (OUTPATIENT)
Dept: DERMATOLOGY | Facility: CLINIC | Age: 61
End: 2021-11-29
Attending: FAMILY MEDICINE
Payer: COMMERCIAL

## 2021-11-29 VITALS — HEART RATE: 60 BPM | SYSTOLIC BLOOD PRESSURE: 118 MMHG | DIASTOLIC BLOOD PRESSURE: 75 MMHG | OXYGEN SATURATION: 97 %

## 2021-11-29 DIAGNOSIS — D22.9 NEVUS: ICD-10-CM

## 2021-11-29 DIAGNOSIS — L57.0 ACTINIC KERATOSIS: ICD-10-CM

## 2021-11-29 DIAGNOSIS — L82.1 SEBORRHEIC KERATOSIS: ICD-10-CM

## 2021-11-29 DIAGNOSIS — L81.4 LENTIGO: ICD-10-CM

## 2021-11-29 DIAGNOSIS — L40.9 SCALP PSORIASIS: Primary | ICD-10-CM

## 2021-11-29 DIAGNOSIS — D18.01 ANGIOMA OF SKIN: ICD-10-CM

## 2021-11-29 PROCEDURE — 17000 DESTRUCT PREMALG LESION: CPT | Performed by: PHYSICIAN ASSISTANT

## 2021-11-29 PROCEDURE — 99214 OFFICE O/P EST MOD 30 MIN: CPT | Mod: 25 | Performed by: PHYSICIAN ASSISTANT

## 2021-11-29 RX ORDER — FLUOCINONIDE TOPICAL SOLUTION USP, 0.05% 0.5 MG/ML
SOLUTION TOPICAL
Qty: 50 ML | Refills: 3 | Status: SHIPPED | OUTPATIENT
Start: 2021-11-29

## 2021-11-29 NOTE — LETTER
11/29/2021         RE: Kenya Rubalcava  [address] [place] [address]        Dear Colleague,    Thank you for referring your patient, Kenya Rubalcava, to the [facility]. Please see a copy of my visit note below.    HPI:   Chief complaints: Kenya Rubalcava is a pleasant 61 year old female who presents for Full skin cancer screening to rule out skin cancer   Last Skin Exam: 5-6 years ago      1st Baseline: no  Personal HX of Skin Cancer: no, she did have one AK   Personal HX of Malignant Melanoma: no   Family HX of Skin Cancer / Malignant Melanoma: no  Personal HX of Atypical Moles:   no  Risk factors: history of sun exposure and burns  New / Changing lesions:yes few scaly spots  Social History:   On review of systems, there are no further skin complaints, patient is feeling otherwise well.   ROS of the following were done and are negative: Constitutional, Eyes, Ears, Nose,   Mouth, Throat, Cardiovascular, Respiratory, GI, Genitourinary, Musculoskeletal,   Psychiatric, Endocrine, Allergic/Immunologic.    PHYSICAL EXAM:   /75 (BP Location: Right arm, Cuff Size: Adult Large)   Pulse 60   SpO2 97%   Skin exam performed as follows: Type 2 skin. Mood appropriate  Alert and Oriented X 3. Well developed, well nourished in no distress.  General appearance: Normal  Head including face: Normal  Eyes: conjunctiva and lids: Normal  Mouth: Lips, teeth, gums: Normal  Neck: Normal  Chest-breast/axillae: Normal  Back: Normal  Spleen and liver: Normal  Cardiovascular: Exam of peripheral vascular system by observation for swelling, varicosities, edema: Normal  Genitalia: groin, buttocks: Normal  Extremities: digits/nails (clubbing): Normal  Eccrine and Apocrine glands: Normal  Right upper extremity: Normal  Left upper extremity: Normal  Right lower extremity: Normal  Left lower extremity: Normal  Skin: Scalp and body hair: See below    Pt deferred exam of breasts, groin, buttocks:  No    Other physical findings:  1. Multiple pigmented macules on extremities and trunk  2. Multiple pigmented macules on face, trunk and extremities  3. Multiple vascular papules on trunk, arms and legs  4. Multiple scattered keratotic plaques  5. Pink gritty papule on the left cheek x 1  6. psoriasiform dermatitis on the occipital scalp       Except as noted above, no other signs of skin cancer or melanoma.     ASSESSMENT/PLAN:   Benign Full skin cancer screening today. . Patient with history of none  Advised on monthly self exams and 1 year  Patient Education: Appropriate brochures given.    1. Multiple benign appearing melanocytic nevi on arms, legs and trunk. Discussed ABCDEs of melanoma and sunscreen.   2. Multiple lentigos on arms, legs and trunk. Advised benign, no treatment needed.  3. Multiple scattered angiomas. Advised benign, no treatment needed.   4. Seborrheic keratosis on arms, legs and trunk. Advised benign, no treatment needed.  5. Actinic keratosis on the left cheek x 1. As precancerous, cryosurgery performed. Advised on blistering and post-op care. Advised if not resolved in 1-2 months to return for evaluation  6. Scalp psoriasis - start lidex BID x 1-2 weeks then PRN            Follow-up: yearly    1.) Patient was asked about new and changing moles. YES  2.) Patient received a complete physical skin examination: YES  3.) Patient was counseled to perform a monthly self skin examination: YES  Scribed By: Desire Norris, MS, PAMIKEL          Again, thank you for allowing me to participate in the care of your patient.        Sincerely,        Desire Norris PA-C

## 2021-11-29 NOTE — PROGRESS NOTES
HPI:   Chief complaints: Kenya Rubalcava is a pleasant 61 year old female who presents for Full skin cancer screening to rule out skin cancer   Last Skin Exam: 5-6 years ago      1st Baseline: no  Personal HX of Skin Cancer: no, she did have one AK   Personal HX of Malignant Melanoma: no   Family HX of Skin Cancer / Malignant Melanoma: no  Personal HX of Atypical Moles:   no  Risk factors: history of sun exposure and burns  New / Changing lesions:yes few scaly spots  Social History:   On review of systems, there are no further skin complaints, patient is feeling otherwise well.   ROS of the following were done and are negative: Constitutional, Eyes, Ears, Nose,   Mouth, Throat, Cardiovascular, Respiratory, GI, Genitourinary, Musculoskeletal,   Psychiatric, Endocrine, Allergic/Immunologic.    PHYSICAL EXAM:   /75 (BP Location: Right arm, Cuff Size: Adult Large)   Pulse 60   SpO2 97%   Skin exam performed as follows: Type 2 skin. Mood appropriate  Alert and Oriented X 3. Well developed, well nourished in no distress.  General appearance: Normal  Head including face: Normal  Eyes: conjunctiva and lids: Normal  Mouth: Lips, teeth, gums: Normal  Neck: Normal  Chest-breast/axillae: Normal  Back: Normal  Spleen and liver: Normal  Cardiovascular: Exam of peripheral vascular system by observation for swelling, varicosities, edema: Normal  Genitalia: groin, buttocks: Normal  Extremities: digits/nails (clubbing): Normal  Eccrine and Apocrine glands: Normal  Right upper extremity: Normal  Left upper extremity: Normal  Right lower extremity: Normal  Left lower extremity: Normal  Skin: Scalp and body hair: See below    Pt deferred exam of breasts, groin, buttocks: No    Other physical findings:  1. Multiple pigmented macules on extremities and trunk  2. Multiple pigmented macules on face, trunk and extremities  3. Multiple vascular papules on trunk, arms and legs  4. Multiple scattered keratotic plaques  5. Pink gritty  papule on the left cheek x 1  6. psoriasiform dermatitis on the occipital scalp       Except as noted above, no other signs of skin cancer or melanoma.     ASSESSMENT/PLAN:   Benign Full skin cancer screening today. . Patient with history of none  Advised on monthly self exams and 1 year  Patient Education: Appropriate brochures given.    1. Multiple benign appearing melanocytic nevi on arms, legs and trunk. Discussed ABCDEs of melanoma and sunscreen.   2. Multiple lentigos on arms, legs and trunk. Advised benign, no treatment needed.  3. Multiple scattered angiomas. Advised benign, no treatment needed.   4. Seborrheic keratosis on arms, legs and trunk. Advised benign, no treatment needed.  5. Actinic keratosis on the left cheek x 1. As precancerous, cryosurgery performed. Advised on blistering and post-op care. Advised if not resolved in 1-2 months to return for evaluation  6. Scalp psoriasis - start lidex BID x 1-2 weeks then PRN            Follow-up: yearly    1.) Patient was asked about new and changing moles. YES  2.) Patient received a complete physical skin examination: YES  3.) Patient was counseled to perform a monthly self skin examination: YES  Scribed By: Desire Norris, MS, PA-C

## 2021-12-06 DIAGNOSIS — F51.01 PRIMARY INSOMNIA: ICD-10-CM

## 2021-12-06 NOTE — TELEPHONE ENCOUNTER
Routing refill request to provider for review/approval because:  Drug not on the [facility] refill protocol   Thank you.  Mark Cosme RN

## 2021-12-07 RX ORDER — TEMAZEPAM 15 MG/1
15 CAPSULE ORAL
Qty: 30 CAPSULE | Refills: 0 | Status: SHIPPED | OUTPATIENT
Start: 2021-12-07 | End: 2022-01-11

## 2022-01-11 DIAGNOSIS — F51.01 PRIMARY INSOMNIA: ICD-10-CM

## 2022-01-11 RX ORDER — TEMAZEPAM 15 MG/1
15 CAPSULE ORAL
Qty: 30 CAPSULE | Refills: 0 | Status: SHIPPED | OUTPATIENT
Start: 2022-01-11 | End: 2022-02-18

## 2022-01-17 ENCOUNTER — HOSPITAL ENCOUNTER (OUTPATIENT)
Dept: MAMMOGRAPHY | Facility: CLINIC | Age: 62
Discharge: HOME | End: 2022-01-17
Attending: FAMILY MEDICINE | Admitting: FAMILY MEDICINE
Payer: COMMERCIAL

## 2022-01-17 DIAGNOSIS — Z12.31 VISIT FOR SCREENING MAMMOGRAM: ICD-10-CM

## 2022-01-17 PROCEDURE — 77067 SCR MAMMO BI INCL CAD: CPT

## 2022-02-08 DIAGNOSIS — K86.9 PANCREATIC LESION: Primary | ICD-10-CM

## 2022-02-18 DIAGNOSIS — F51.01 PRIMARY INSOMNIA: ICD-10-CM

## 2022-02-18 RX ORDER — TEMAZEPAM 15 MG/1
CAPSULE ORAL
Qty: 30 CAPSULE | Refills: 0 | Status: SHIPPED | OUTPATIENT
Start: 2022-02-18 | End: 2022-03-07

## 2022-02-18 NOTE — TELEPHONE ENCOUNTER
Routing refill request to provider for review/approval because:  Drug not on the [facility] refill protocol   Kary Costello RN

## 2022-02-24 DIAGNOSIS — K86.2 PANCREATIC CYST: Primary | ICD-10-CM

## 2022-03-01 ENCOUNTER — HOSPITAL ENCOUNTER (OUTPATIENT)
Dept: MRI IMAGING | Facility: CLINIC | Age: 62
Discharge: HOME | End: 2022-03-01
Attending: FAMILY MEDICINE | Admitting: FAMILY MEDICINE
Payer: COMMERCIAL

## 2022-03-01 DIAGNOSIS — K86.2 PANCREATIC CYST: ICD-10-CM

## 2022-03-01 PROCEDURE — 255N000002 HC RX 255 OP 636: Performed by: FAMILY MEDICINE

## 2022-03-01 PROCEDURE — 74183 MRI ABD W/O CNTR FLWD CNTR: CPT

## 2022-03-01 PROCEDURE — A9585 GADOBUTROL INJECTION: HCPCS | Performed by: FAMILY MEDICINE

## 2022-03-01 PROCEDURE — 258N000003 HC RX IP 258 OP 636: Performed by: FAMILY MEDICINE

## 2022-03-01 RX ORDER — GADOBUTROL 604.72 MG/ML
10 INJECTION INTRAVENOUS ONCE
Status: COMPLETED | OUTPATIENT
Start: 2022-03-01 | End: 2022-03-01

## 2022-03-01 RX ADMIN — GADOBUTROL 10 ML: 604.72 INJECTION INTRAVENOUS at 08:20

## 2022-03-01 RX ADMIN — SODIUM CHLORIDE 50 ML: 9 INJECTION, SOLUTION INTRAVENOUS at 08:20

## 2022-03-07 ENCOUNTER — OFFICE VISIT (OUTPATIENT)
Dept: FAMILY MEDICINE | Facility: CLINIC | Age: 62
End: 2022-03-07
Payer: COMMERCIAL

## 2022-03-07 VITALS
OXYGEN SATURATION: 98 % | BODY MASS INDEX: 37.2 KG/M2 | HEART RATE: 60 BPM | DIASTOLIC BLOOD PRESSURE: 74 MMHG | SYSTOLIC BLOOD PRESSURE: 125 MMHG | WEIGHT: 210 LBS | TEMPERATURE: 98 F | RESPIRATION RATE: 16 BRPM

## 2022-03-07 DIAGNOSIS — J01.01 ACUTE RECURRENT MAXILLARY SINUSITIS: Primary | ICD-10-CM

## 2022-03-07 DIAGNOSIS — M25.562 ACUTE PAIN OF LEFT KNEE: ICD-10-CM

## 2022-03-07 PROCEDURE — 99213 OFFICE O/P EST LOW 20 MIN: CPT | Performed by: PHYSICIAN ASSISTANT

## 2022-03-07 ASSESSMENT — ANXIETY QUESTIONNAIRES
1. FEELING NERVOUS, ANXIOUS, OR ON EDGE: SEVERAL DAYS
3. WORRYING TOO MUCH ABOUT DIFFERENT THINGS: SEVERAL DAYS
7. FEELING AFRAID AS IF SOMETHING AWFUL MIGHT HAPPEN: SEVERAL DAYS
GAD7 TOTAL SCORE: 5
IF YOU CHECKED OFF ANY PROBLEMS ON THIS QUESTIONNAIRE, HOW DIFFICULT HAVE THESE PROBLEMS MADE IT FOR YOU TO DO YOUR WORK, TAKE CARE OF THINGS AT HOME, OR GET ALONG WITH OTHER PEOPLE: SOMEWHAT DIFFICULT
5. BEING SO RESTLESS THAT IT IS HARD TO SIT STILL: NOT AT ALL
6. BECOMING EASILY ANNOYED OR IRRITABLE: NOT AT ALL
2. NOT BEING ABLE TO STOP OR CONTROL WORRYING: SEVERAL DAYS

## 2022-03-07 ASSESSMENT — PATIENT HEALTH QUESTIONNAIRE - PHQ9
5. POOR APPETITE OR OVEREATING: SEVERAL DAYS
SUM OF ALL RESPONSES TO PHQ QUESTIONS 1-9: 5

## 2022-03-07 NOTE — PROGRESS NOTES
Assessment & Plan     ASSESSMENT/PLAN:      ICD-10-CM    1. Acute recurrent maxillary sinusitis  J01.01 CT Sinus w/o Contrast     Otolaryngology Referral     amoxicillin-clavulanate (AUGMENTIN) 875-125 MG tablet     DISCONTINUED: amoxicillin-clavulanate (AUGMENTIN) 875-125 MG tablet   2. Acute pain of left knee  M25.562 Orthopedic  Referral     Due to chronicity of sinus pain, if not improving with this round of antibiotics would recommend sinus CT.    Patient Instructions   augmentin antibiotics  CT: Please contact [facility]  at [phone] to schedule appointment    ENT referral    Ortho referral for knee    Let me know if you would like a therapy referral    No follow-ups on file.    DEE Alejandra [facility] OSMIN Cline is a 61 year old who presents for the following health issues     HPI     Acute Illness  Acute illness concerns: Sinus pressure and pain  Onset/Duration: has been on antibiotics for sinus but as soon as she finishes the symptoms return  Symptoms:  Fever: no  Chills/Sweats: no  Headache (location?): YES  Sinus Pressure: YES  Conjunctivitis:  no  Ear Pain: YES: right  Rhinorrhea: no  Congestion: YES  Sore Throat: no  Cough: no  Wheeze: no  Decreased Appetite: no  Nausea: no  Vomiting: no  Diarrhea: no  Dysuria/Freq.: no  Dysuria or Hematuria: no  Fatigue/Achiness: YES  Sick/Strep Exposure: no  Therapies tried and outcome: Flonase, vicks, saline nasal spray, OTC cold medication    Had sinus CT years ago, showed cyst. Nothing was done.  Has seasonal allergies - year round. flonase PRN, zyrtec PRN.  Last antibiotics in Nov, usually needs them twice a year. Has had ongoing issues since then.  Pain is mostly right sided.  Bloody noses right side frequently, not correlated to flonase.    Left knee pain and numbness. Pain is at joint line but has lateral numbness. No real back issues. Starting to affect her day to day  more.    Having anxiety regarding everything going on in the world.    Review of Systems   Other than noted above, general, HEENT, respiratory, cardiac, MS, and gastrointestinal systems are negative.       Objective    /74   Pulse 60   Temp 98  F (36.7  C) (Tympanic)   Resp 16   Wt 95.3 kg (210 lb)   SpO2 98%   BMI 37.20 kg/m    Body mass index is 37.2 kg/m .  Physical Exam   GENERAL: healthy, alert and no distress  EYES: Eyes grossly normal to inspection, PERRL and conjunctivae and sclerae normal  HENT: ear canals and TM's normal, nose and mouth without ulcers or lesions  POSITIVE maxillary sinus tenderness  NECK: no adenopathy, no asymmetry, masses, or scars and thyroid normal to palpation  RESP: lungs clear to auscultation - no rales, rhonchi or wheezes  CV: regular rate and rhythm, normal S1 S2, no S3 or S4, no murmur, click or rub, no peripheral edema and peripheral pulses strong  ABDOMEN: soft, nontender, no hepatosplenomegaly, no masses and bowel sounds normal  MS: no gross musculoskeletal defects noted, no edema  ORTHO: Knee Exam: Inspection: AP/lateral alignment normal, No effusion  Tender: medial joint line. Notes numbness lateral knee  Active Range of Motion: full flexion, full extension  Strength: full   PSYCH: Alert and oriented times 3; speech- coherent , normal rate and volume; able to articulate logical thoughts, able to abstract reason, no tangential thoughts, no hallucinations or delusions, affect- normal

## 2022-03-07 NOTE — PATIENT INSTRUCTIONS
augmentin antibiotics  CT: Please contact [facility]  at [phone] to schedule appointment    ENT referral    Ortho referral for knee    Let me know if you would like a therapy referral

## 2022-03-08 ASSESSMENT — ANXIETY QUESTIONNAIRES: GAD7 TOTAL SCORE: 5

## 2022-03-14 ENCOUNTER — TELEPHONE (OUTPATIENT)
Dept: FAMILY MEDICINE | Facility: CLINIC | Age: 62
End: 2022-03-14
Payer: COMMERCIAL

## 2022-03-14 NOTE — TELEPHONE ENCOUNTER
I spent time on the phone with insurance/peer to peer review, did get the CT scan approved.    Authorization number: [id number]  The CT scan is authorized From today through 4/12/22 so I confirmed it is okay for her to have this CT scan done tomorrow.   Please notify patient.  Luz Maria Samuels PA-C

## 2022-03-14 NOTE — TELEPHONE ENCOUNTER
Call placed to patient.  Relayed message and authorization number per WANDER Samuels.  Patient verbalized understanding and was thankful for Luz Maria's help.  Kary Costello RN

## 2022-03-14 NOTE — TELEPHONE ENCOUNTER
Received this note:  Peer to Peer Request     Patient Name: Kenya Rubalcava   : 1960   MRN: [id number]     Evelyne Samuels PA-C,     The authorization for procedure CT SINUS WO on date of service 03/15/2022 has been denied. We were unsuccessful in obtaining approval through clinical review. A [id number] review can be done by calling the insurance/third party authorization vendor with the following information:     Insurance: [place] [employer]   Auth Vendor: [employer]   Phone: [phone] Clinic must call to schedule   Due: When possible     Clinicals already Provided   Order: CT scan order   Visit Notes: 2022 clinic notes   Results: N/A   Other: N/A     Patient ID: [id number]   Case/Ref #: [id number]           Patient contact: Yes via My Chart   Patient response: NA   Patient Phone #: [phone]     Denial Reason: Per Rep Pepper @ [facility]-documentation of three weeks of provider directed steroid treatment and salt water rinses, and failure of improvement after these treatments is needed before the CT scan can be approved.       If you feel you have additional clinical information that could get this denial overturned, please complete the Peer to Peer.   If you choose not to do the P2P, please let me know as soon as possible so that we can reach out to the patient and advise them of their denial.       Thank you & I appreciate your time,     Angela Pereyra   [employer] [place]

## 2022-03-15 ENCOUNTER — HOSPITAL ENCOUNTER (OUTPATIENT)
Dept: CT IMAGING | Facility: CLINIC | Age: 62
Discharge: HOME | End: 2022-03-15
Attending: PHYSICIAN ASSISTANT | Admitting: PHYSICIAN ASSISTANT
Payer: COMMERCIAL

## 2022-03-15 DIAGNOSIS — J01.01 ACUTE RECURRENT MAXILLARY SINUSITIS: ICD-10-CM

## 2022-03-15 PROCEDURE — 70486 CT MAXILLOFACIAL W/O DYE: CPT

## 2022-03-23 NOTE — PROGRESS NOTES
History of Present Illness - Kenya Rubalcava is a very pleasant 61 year old female here to see me for the first time due to nasal and sinus problems.    She tells me that for her entire adult life she has had issues with sinuses and especially spring and summer allergies.  But her allergies have steadily gotten worse and will often convert to a sinus infection.  She had a septoplasty in [place].    A CT scan was ordered by her PCP and done on 3/15/2022.  The images were personally reviewed for this consultation.  The septum is quite straight, but it appears there might be a small perforation midway back.  The maxillary sinus on the LEFT has a large polyp or mucocele at the osteomeatal unit.  There is scattered mucosal thickening in the ethmoid system bilaterally.  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    In addition she tells me that she has been diagnosed with RIGHT Meniere's Disease and currently is not being followed for it.  Her last consult with ENT was over ten years ago.  She has managed it with Effexor, Dyazide, and as needed Valium.  She has an implanted Esteem Hearing aid on the RIGHT.  However, she does not use it, because it caused severe hyperacusis.    And finally she wants to talk about her tonsils.  She gets strep at least several times per year, and has developed chronic tonsil stones as well.    Past Medical History -   Patient Active Problem List   Diagnosis     Anxiety disorder due to medical condition     Essential hypertension, benign     Sensorineural hearing loss     Peripheral vertigo     Allergic rhinitis     Symptomatic menopausal or female climacteric states     Psoriasis     Hyperlipidemia LDL goal <130     Kidney stones     Hypertriglyceridemia     Meniere disease     Anxiety     Obesity (BMI 35.0-39.9) with comorbidity (H)     Primary insomnia       Current Medications -   Current Outpatient Medications:      amoxicillin-clavulanate  (AUGMENTIN) 875-125 MG tablet, Take 1 tablet by mouth 2 times daily, Disp: 20 tablet, Rfl: 0     ASPIRIN 325 MG OR TBEC, one tablet by mouth daily (buffered), Disp: , Rfl:      atenolol (TENORMIN) 50 MG tablet, TAKE 1 TABLET (50 MG) BY MOUTH ONCE DAILY, Disp: 90 tablet, Rfl: 3     betamethasone dipropionate (DIPROSONE) 0.05 % external ointment, Apply 1g  sparingly to psoriatic plaques  twice daily as needed.  1 tube per month Do not apply to face. (Patient not taking: Reported on 3/7/2022), Disp: 45 g, Rfl: 4     buPROPion (WELLBUTRIN) 75 MG tablet, Take 1 tablet (75 mg) by mouth 2 times daily (Patient not taking: Reported on 3/7/2022), Disp: 60 tablet, Rfl: 4     diazepam (VALIUM) 5 MG tablet, TAKE 1 TABLET BY MOUTH EVERY 12 HOURS AS NEEDED FOR ANXIETY OR SLEEP. (Patient not taking: Reported on 3/7/2022), Disp: 30 tablet, Rfl: 0     DIPHENHYDRAMINE HCL PO, Take 25 mg by mouth daily as needed (for Menieres disease) (Patient not taking: Reported on 3/7/2022), Disp: , Rfl:      fluocinonide (LIDEX) 0.05 % external solution, Apply to scalp BID x 1-2 weeks PRN, Disp: 50 mL, Rfl: 3     fluticasone (FLONASE) 50 MCG/ACT nasal spray, Spray 1-2 sprays into both nostrils daily, Disp: 16 g, Rfl: 3     meclizine (ANTIVERT) 25 MG tablet, Take 2 tablets by mouth 2 times daily as needed. (Patient not taking: Reported on 3/7/2022), Disp: 60 tablet, Rfl: 0     pravastatin (PRAVACHOL) 40 MG tablet, TAKE ONE TABLET BY MOUTH EVERY DAY AT BEDTIME, Disp: 90 tablet, Rfl: 2     prochlorperazine (COMPAZINE) 10 MG tablet, Take 0.5 tablets (5 mg) by mouth every 6 hours as needed for nausea or vomiting (Patient not taking: Reported on 3/7/2022), Disp: 10 tablet, Rfl: 1     traZODone (DESYREL) 100 MG tablet, Take 2 tablets (200 mg) by mouth At Bedtime, Disp: 180 tablet, Rfl: 3     triamterene-HCTZ (DYAZIDE) 37.5-25 MG capsule, TAKE 1 CAPSULE BY MOUTH ONCE EVERY MORNING, Disp: 90 capsule, Rfl: 3     venlafaxine (EFFEXOR-XR) 150 MG 24 hr capsule,  TAKE 1 CAPSULE (150 MG) BY MOUTH ONCE DAILY, Disp: 90 capsule, Rfl: 3    Allergies -   Allergies   Allergen Reactions     Ivp Dye [Contrast Dye] Anaphylaxis and Hives     Cats Anaphylaxis     Niaspan [Niacin] Rash     Pollen Extract/Tree Extract        Social History -   Social History     Socioeconomic History     Marital status: Single     Spouse name: Not on file     Number of children: 2     Years of education: 18     Highest education level: Not on file   Occupational History     Occupation: Teacher     Employer: OTHER     Comment: [employer]   Tobacco Use     Smoking status: Never Smoker     Smokeless tobacco: Never Used   Substance and Sexual Activity     Alcohol use: Yes     Alcohol/week: 0.0 standard drinks     Comment: rare      Drug use: No     Sexual activity: Yes     Partners: Male     Birth control/protection: Surgical     Comment: Hysterectomy   Other Topics Concern      Service Not Asked     Blood Transfusions Not Asked     Caffeine Concern Not Asked     Occupational Exposure Not Asked     Hobby Hazards Not Asked     Sleep Concern Not Asked     Stress Concern Not Asked     Weight Concern Not Asked     Special Diet Not Asked     Back Care Not Asked     Exercise Not Asked     Bike Helmet Not Asked     Seat Belt Yes     Self-Exams Not Asked     Parent/sibling w/ CABG, MI or angioplasty before 65F 55M? No   Social History Narrative     Not on file     Social Determinants of Health     Financial Resource Strain: Not on file   Food Insecurity: Not on file   Transportation Needs: Not on file   Physical Activity: Not on file   Stress: Not on file   Social Connections: Not on file   Intimate Partner Violence: Not on file   Housing Stability: Not on file       Family History -   Family History   Problem Relation Age of Onset     Heart Disease Brother      Hypertension Brother      Asthma Daughter      Heart Disease Brother      Hypertension Brother      C.A.D. Father         MI       Hypertension Father      Lipids Father 50     C.A.D. Paternal Aunt         MI-50s     Cancer Maternal Aunt         Ovarian CA     Cerebrovascular Disease No family hx of      Breast Cancer No family hx of      Cancer - colorectal No family hx of      Prostate Cancer No family hx of        Review of Systems - As per HPI and PMHx, otherwise 10+ system review of the head and neck, and general constitution is negative.    Physical Exam  /83 (BP Location: Right arm, Patient Position: Sitting, Cuff Size: Adult Large)   Pulse 60   Wt 95.3 kg (210 lb)   SpO2 98%   BMI 37.20 kg/m      General - The patient is well nourished and well developed, and appears to have good nutritional status.  Alert and oriented to person and place, answers questions and cooperates with examination appropriately.   Head and Face - Normocephalic and atraumatic, with no gross asymmetry noted of the contour of the facial features.  The facial nerve is intact, with strong symmetric movements.  Voice and Breathing - The patient was breathing comfortably without the use of accessory muscles. There was no wheezing, stridor, or stertor.  The patients voice was clear and strong, and had appropriate pitch and quality.  Ears - The tympanic membranes are normal in appearance, bony landmarks are intact.  No retraction, perforation, or masses.  No fluid or purulence was seen in the external canal or the middle ear. No evidence of infection of the middle ear or external canal, cerumen was normal in appearance.  Eyes - Extraocular movements intact, and the pupils were reactive to light.  Sclera were not icteric or injected, conjunctiva were pink and moist.  Mouth - Examination of the oral cavity showed pink, healthy oral mucosa. No lesions or ulcerations noted.  The tongue was mobile and midline, and the dentition were in good condition.    Throat - The walls of the oropharynx were smooth, pink, moist, symmetric, and had no lesions or ulcerations.   The tonsillar pillars and soft palate were symmetric.  The uvula was midline on elevation.    Neck - Normal midline excursion of the laryngotracheal complex during swallowing.  Full range of motion on passive movement.  Palpation of the occipital, submental, submandibular, internal jugular chain, and supraclavicular nodes did not demonstrate any abnormal lymph nodes or masses.  The carotid pulse was palpable bilaterally.  Palpation of the thyroid was soft and smooth, with no nodules or goiter appreciated.  The trachea was mobile and midline.  Nose - External contour is symmetric, no gross deflection or scars.  Nasal mucosa is pink and moist with no abnormal mucus.  The septum was midline and non-obstructive, turbinates of normal size and position.  No polyps, masses, or purulence noted on examination.      A/P - Kenya Rubalcava is a 61 year old female  (J32.4) Chronic pansinusitis  (primary encounter diagnosis)  (H81.01) Meniere's disease, right  (H90.A21) Sensorineural hearing loss (SNHL) of right ear with restricted hearing of left ear    For the Meniere's it sounds like the RIGHT side has already burned out.  I recommend she make an appointment for a new audiology study and have it sent to me, and I will manage her long term. Especially if this starts to happen on the LEFT remaining good ear.    Based on the failure of medical management which has included multiple courses of antibiotics, antihistamines, nasal steroid sprays, saline lavage, and decongestants, my recommendation is for functional endoscopic sinus surgery, with special focus on the Vivian Bullosa bilaterally.  Also, we will do tonsillectomy at the same operation    The basic premise of functional endoscopic sinus surgery was discussed at length.  This included the concept behind restoration of the natural drainage pathways for the paranasal sinuses using the latest minimally invasive, minimally traumatic techniques and instruments.    Risks  discussed included the risks on infection, bleeding, the risks of general anesthesia.  Also specific to functional endoscopic sinus surgery, the risks of permanent damage to the eyes/vision, tear ducts, sense of smell, base of skull/brain, and CSF leak were described.  And finally, the possibility that functional endoscopic sinus surgery may not relieve sinus disease, and that there might be continued need for medical management was also discussed.  The patient understood and wished to proceed with scheduling.

## 2022-03-28 ENCOUNTER — OFFICE VISIT (OUTPATIENT)
Dept: OTOLARYNGOLOGY | Facility: CLINIC | Age: 62
End: 2022-03-28
Payer: COMMERCIAL

## 2022-03-28 VITALS
BODY MASS INDEX: 37.2 KG/M2 | HEART RATE: 60 BPM | WEIGHT: 210 LBS | OXYGEN SATURATION: 98 % | DIASTOLIC BLOOD PRESSURE: 83 MMHG | SYSTOLIC BLOOD PRESSURE: 132 MMHG

## 2022-03-28 DIAGNOSIS — J32.4 CHRONIC PANSINUSITIS: Primary | ICD-10-CM

## 2022-03-28 DIAGNOSIS — H90.A21 SENSORINEURAL HEARING LOSS (SNHL) OF RIGHT EAR WITH RESTRICTED HEARING OF LEFT EAR: ICD-10-CM

## 2022-03-28 DIAGNOSIS — J35.01 CHRONIC TONSILLITIS: ICD-10-CM

## 2022-03-28 DIAGNOSIS — H81.01 MENIERE'S DISEASE, RIGHT: ICD-10-CM

## 2022-03-28 PROCEDURE — 99204 OFFICE O/P NEW MOD 45 MIN: CPT | Performed by: OTOLARYNGOLOGY

## 2022-03-28 NOTE — NURSING NOTE
"Chief Complaint   Patient presents with     Sinusitis       Vitals:    03/28/22 1341   BP: 132/83   BP Location: Right arm   Patient Position: Sitting   Cuff Size: Adult Large   Pulse: 60   SpO2: 98%   Weight: 95.3 kg (210 lb)     Wt Readings from Last 1 Encounters:   03/28/22 95.3 kg (210 lb)     Ht Readings from Last 1 Encounters:   10/05/21 1.6 m (5' 3\")       Lucrecia Bartholomew [facility], 3/28/2022 1:43 PM    "

## 2022-03-28 NOTE — LETTER
3/28/2022         RE: Kenya Rubalcava  [address] [place] [address]        Dear Colleague,    Thank you for referring your patient, Kenya Rubalcava, to the [facility]. Please see a copy of my visit note below.    History of Present Illness - Kenya Rubalcava is a very pleasant 61 year old female here to see me for the first time due to nasal and sinus problems.    She tells me that for her entire adult life she has had issues with sinuses and especially spring and summer allergies.  But her allergies have steadily gotten worse and will often convert to a sinus infection.  She had a septoplasty in [place].    A CT scan was ordered by her PCP and done on 3/15/2022.  The images were personally reviewed for this consultation.  The septum is quite straight, but it appears there might be a small perforation midway back.  The maxillary sinus on the LEFT has a large polyp or mucocele at the osteomeatal unit.  There is scattered mucosal thickening in the ethmoid system bilaterally.  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    In addition she tells me that she has been diagnosed with RIGHT Meniere's Disease and currently is not being followed for it.  Her last consult with ENT was over ten years ago.  She has managed it with Effexor, Dyazide, and as needed Valium.  She has an implanted Esteem Hearing aid on the RIGHT.  However, she does not use it, because it caused severe hyperacusis.    Past Medical History -   Patient Active Problem List   Diagnosis     Anxiety disorder due to medical condition     Essential hypertension, benign     Sensorineural hearing loss     Peripheral vertigo     Allergic rhinitis     Symptomatic menopausal or female climacteric states     Psoriasis     Hyperlipidemia LDL goal <130     Kidney stones     Hypertriglyceridemia     Meniere disease     Anxiety     Obesity (BMI 35.0-39.9) with comorbidity (H)     Primary  insomnia       Current Medications -   Current Outpatient Medications:      amoxicillin-clavulanate (AUGMENTIN) 875-125 MG tablet, Take 1 tablet by mouth 2 times daily, Disp: 20 tablet, Rfl: 0     ASPIRIN 325 MG OR TBEC, one tablet by mouth daily (buffered), Disp: , Rfl:      atenolol (TENORMIN) 50 MG tablet, TAKE 1 TABLET (50 MG) BY MOUTH ONCE DAILY, Disp: 90 tablet, Rfl: 3     betamethasone dipropionate (DIPROSONE) 0.05 % external ointment, Apply 1g  sparingly to psoriatic plaques  twice daily as needed.  1 tube per month Do not apply to face. (Patient not taking: Reported on 3/7/2022), Disp: 45 g, Rfl: 4     buPROPion (WELLBUTRIN) 75 MG tablet, Take 1 tablet (75 mg) by mouth 2 times daily (Patient not taking: Reported on 3/7/2022), Disp: 60 tablet, Rfl: 4     diazepam (VALIUM) 5 MG tablet, TAKE 1 TABLET BY MOUTH EVERY 12 HOURS AS NEEDED FOR ANXIETY OR SLEEP. (Patient not taking: Reported on 3/7/2022), Disp: 30 tablet, Rfl: 0     DIPHENHYDRAMINE HCL PO, Take 25 mg by mouth daily as needed (for Menieres disease) (Patient not taking: Reported on 3/7/2022), Disp: , Rfl:      fluocinonide (LIDEX) 0.05 % external solution, Apply to scalp BID x 1-2 weeks PRN, Disp: 50 mL, Rfl: 3     fluticasone (FLONASE) 50 MCG/ACT nasal spray, Spray 1-2 sprays into both nostrils daily, Disp: 16 g, Rfl: 3     meclizine (ANTIVERT) 25 MG tablet, Take 2 tablets by mouth 2 times daily as needed. (Patient not taking: Reported on 3/7/2022), Disp: 60 tablet, Rfl: 0     pravastatin (PRAVACHOL) 40 MG tablet, TAKE ONE TABLET BY MOUTH EVERY DAY AT BEDTIME, Disp: 90 tablet, Rfl: 2     prochlorperazine (COMPAZINE) 10 MG tablet, Take 0.5 tablets (5 mg) by mouth every 6 hours as needed for nausea or vomiting (Patient not taking: Reported on 3/7/2022), Disp: 10 tablet, Rfl: 1     traZODone (DESYREL) 100 MG tablet, Take 2 tablets (200 mg) by mouth At Bedtime, Disp: 180 tablet, Rfl: 3     triamterene-HCTZ (DYAZIDE) 37.5-25 MG capsule, TAKE 1 CAPSULE BY  MOUTH ONCE EVERY MORNING, Disp: 90 capsule, Rfl: 3     venlafaxine (EFFEXOR-XR) 150 MG 24 hr capsule, TAKE 1 CAPSULE (150 MG) BY MOUTH ONCE DAILY, Disp: 90 capsule, Rfl: 3    Allergies -   Allergies   Allergen Reactions     Ivp Dye [Contrast Dye] Anaphylaxis and Hives     Cats Anaphylaxis     Niaspan [Niacin] Rash     Pollen Extract/Tree Extract        Social History -   Social History     Socioeconomic History     Marital status: Single     Spouse name: Not on file     Number of children: 2     Years of education: 18     Highest education level: Not on file   Occupational History     Occupation: Teacher     Employer: OTHER     Comment: [employer]   Tobacco Use     Smoking status: Never Smoker     Smokeless tobacco: Never Used   Substance and Sexual Activity     Alcohol use: Yes     Alcohol/week: 0.0 standard drinks     Comment: rare      Drug use: No     Sexual activity: Yes     Partners: Male     Birth control/protection: Surgical     Comment: Hysterectomy   Other Topics Concern      Service Not Asked     Blood Transfusions Not Asked     Caffeine Concern Not Asked     Occupational Exposure Not Asked     Hobby Hazards Not Asked     Sleep Concern Not Asked     Stress Concern Not Asked     Weight Concern Not Asked     Special Diet Not Asked     Back Care Not Asked     Exercise Not Asked     Bike Helmet Not Asked     Seat Belt Yes     Self-Exams Not Asked     Parent/sibling w/ CABG, MI or angioplasty before 65F 55M? No   Social History Narrative     Not on file     Social Determinants of Health     Financial Resource Strain: Not on file   Food Insecurity: Not on file   Transportation Needs: Not on file   Physical Activity: Not on file   Stress: Not on file   Social Connections: Not on file   Intimate Partner Violence: Not on file   Housing Stability: Not on file       Family History -   Family History   Problem Relation Age of Onset     Heart Disease Brother      Hypertension Brother       Asthma Daughter      Heart Disease Brother      Hypertension Brother      C.A.D. Father         MI      Hypertension Father      Lipids Father 50     C.A.D. Paternal Aunt         MI-50s     Cancer Maternal Aunt         Ovarian CA     Cerebrovascular Disease No family hx of      Breast Cancer No family hx of      Cancer - colorectal No family hx of      Prostate Cancer No family hx of        Review of Systems - As per HPI and PMHx, otherwise 10+ system review of the head and neck, and general constitution is negative.    Physical Exam  /83 (BP Location: Right arm, Patient Position: Sitting, Cuff Size: Adult Large)   Pulse 60   Wt 95.3 kg (210 lb)   SpO2 98%   BMI 37.20 kg/m      General - The patient is well nourished and well developed, and appears to have good nutritional status.  Alert and oriented to person and place, answers questions and cooperates with examination appropriately.   Head and Face - Normocephalic and atraumatic, with no gross asymmetry noted of the contour of the facial features.  The facial nerve is intact, with strong symmetric movements.  Voice and Breathing - The patient was breathing comfortably without the use of accessory muscles. There was no wheezing, stridor, or stertor.  The patients voice was clear and strong, and had appropriate pitch and quality.  Ears - The tympanic membranes are normal in appearance, bony landmarks are intact.  No retraction, perforation, or masses.  No fluid or purulence was seen in the external canal or the middle ear. No evidence of infection of the middle ear or external canal, cerumen was normal in appearance.  Eyes - Extraocular movements intact, and the pupils were reactive to light.  Sclera were not icteric or injected, conjunctiva were pink and moist.  Mouth - Examination of the oral cavity showed pink, healthy oral mucosa. No lesions or ulcerations noted.  The tongue was mobile and midline, and the dentition were in good condition.     Throat - The walls of the oropharynx were smooth, pink, moist, symmetric, and had no lesions or ulcerations.  The tonsillar pillars and soft palate were symmetric.  The uvula was midline on elevation.    Neck - Normal midline excursion of the laryngotracheal complex during swallowing.  Full range of motion on passive movement.  Palpation of the occipital, submental, submandibular, internal jugular chain, and supraclavicular nodes did not demonstrate any abnormal lymph nodes or masses.  The carotid pulse was palpable bilaterally.  Palpation of the thyroid was soft and smooth, with no nodules or goiter appreciated.  The trachea was mobile and midline.  Nose - External contour is symmetric, no gross deflection or scars.  Nasal mucosa is pink and moist with no abnormal mucus.  The septum was midline and non-obstructive, turbinates of normal size and position.  No polyps, masses, or purulence noted on examination.      A/P - Kenya Rubalcava is a 61 year old female  (J32.4) Chronic pansinusitis  (primary encounter diagnosis)  (H81.01) Meniere's disease, right  (H90.A21) Sensorineural hearing loss (SNHL) of right ear with restricted hearing of left ear    For the Meniere's it sounds like the RIGHT side has already burned out.  I recommend she make an appointment for a new audiology study and have it sent to me, and I will manage her long term. Especially if this starts to happen on the LEFT remaining good ear.    Based on the failure of medical management which has included multiple courses of antibiotics, antihistamines, nasal steroid sprays, saline lavage, and decongestants, my recommendation is for functional endoscopic sinus surgery, with special focus on the Vivian Bullosa bilaterally.    The basic premise of functional endoscopic sinus surgery was discussed at length.  This included the concept behind restoration of the natural drainage pathways for the paranasal sinuses using the latest minimally invasive,  minimally traumatic techniques and instruments.    Risks discussed included the risks on infection, bleeding, the risks of general anesthesia.  Also specific to functional endoscopic sinus surgery, the risks of permanent damage to the eyes/vision, tear ducts, sense of smell, base of skull/brain, and CSF leak were described.  And finally, the possibility that functional endoscopic sinus surgery may not relieve sinus disease, and that there might be continued need for medical management was also discussed.  The patient understood and wished to proceed with scheduling.          Again, thank you for allowing me to participate in the care of your patient.        Sincerely,        Dane Lowery MD

## 2022-03-29 ENCOUNTER — TELEPHONE (OUTPATIENT)
Dept: OTOLARYNGOLOGY | Facility: CLINIC | Age: 62
End: 2022-03-29
Payer: COMMERCIAL

## 2022-03-29 DIAGNOSIS — Z11.59 ENCOUNTER FOR SCREENING FOR OTHER VIRAL DISEASES: Primary | ICD-10-CM

## 2022-03-29 PROBLEM — J32.4 CHRONIC PANSINUSITIS: Status: ACTIVE | Noted: 2022-03-29

## 2022-03-29 PROBLEM — J35.01 CHRONIC TONSILLITIS: Status: ACTIVE | Noted: 2022-03-29

## 2022-03-29 NOTE — TELEPHONE ENCOUNTER
Type of surgery: image guided functional endoscopic sinus surgery (FESS) without septoplasty (bilateral), tonsillectomy,possible adenoidectomy (bilateral)  CPT 32227,[id number],poss [id number]      Chronic pansinusitis J32.4     Meniere's disease, right H81.01     Sensorineural hearing loss (SNHL) of right ear with restricted hearing of left ear H90.A21     Chronic tonsillitis J35.01    Location of surgery: [facility]  Date and time of surgery: 5-12-22  TBD  Surgeon: Dr Lowery  Pre-Op Appt Date: 5-3-22  Post-Op Appt Date: 5-19-22 & 6-2-22   Packet sent out: Yes  Pre-cert/Authorization completed:Started prior auth with [place] Agent Marcia over virtual chat on [employer].  Ref# [id number]. Pending auth [id number]. Faxing clinicals to [phone]  Date: 3/31/22    Sarina Meyer  Prior Authorization Dept  [phone]

## 2022-04-08 ENCOUNTER — OFFICE VISIT (OUTPATIENT)
Dept: AUDIOLOGY | Facility: CLINIC | Age: 62
End: 2022-04-08
Payer: COMMERCIAL

## 2022-04-08 DIAGNOSIS — H90.A31 MIXED CONDUCTIVE AND SENSORINEURAL HEARING LOSS OF RIGHT EAR WITH RESTRICTED HEARING OF LEFT EAR: ICD-10-CM

## 2022-04-08 DIAGNOSIS — H90.A22 SENSORINEURAL HEARING LOSS (SNHL) OF LEFT EAR WITH RESTRICTED HEARING OF RIGHT EAR: Primary | ICD-10-CM

## 2022-04-08 PROCEDURE — 92557 COMPREHENSIVE HEARING TEST: CPT

## 2022-04-08 PROCEDURE — 92550 TYMPANOMETRY & REFLEX THRESH: CPT

## 2022-04-08 PROCEDURE — 99207 PR NO CHARGE LOS: CPT

## 2022-04-08 NOTE — TELEPHONE ENCOUNTER
Spoke with Britney slater [place], upon checking  [id number] approved  [id number] approved  [id number] approved  [id number] approved  5/12/22 - 8/9/22    [id number], [id number] prior auth not required.     Auth# [id number]

## 2022-04-08 NOTE — PROGRESS NOTES
AUDIOLOGY REPORT    SUBJECTIVE:  Kenya Rubalcava is a 61 year old female who was seen in the Audiology Clinic at the [facility] for audiologic evaluation, referred by Dr. Beverley M.D. The patient has previously been seen at [facility] for a hearing evaluation on 11/24/2010 which revealed mild sloping to severe mixed hearing loss in the right ear and mild to moderate rising to normal sensorineural hearing loss in the left. The patient reports history of RIGHT Meniere's Disease that began around 30 years ago. She also reports having an Esteem Hearing aid which was implanted 'sometime around 2010'. She currently does not utilize the Esteem hearing aid as it has caused extreme hyperacusis.     Patient is here today with concern her left hearing has declined. She denies bilateral drainage, family history of hearing loss and history of noise exposure. She does, however, report constant, bilateral tinnitus, but notes right is 'worse' than left. She also reports constant aural pressure and occasional pain, bilaterally. Patient notes longstanding occasional dizziness which she describes as lasting all day. The patient notes difficulty with communication in a variety of listening situations.     Patient does not currently wear a left hearing aid, but may be interested in a trial. She has reservations due to her hyperacusis.     OBJECTIVE:    Otoscopic exam indicated ears are clear of cerumen bilaterally     Pure Tone Thresholds assessed using conventional audiometry with good, reliability from 250-8000 Hz bilaterally using insert earphones and circumaural headphones     RIGHT:  severe sloping to profound mixed hearing loss    NOTE: masked bone conduction was not completed due to surgical mastoid (hearing aid implantation) and patient discomfort.     LEFT:   normal at 250 Hz sloping to moderate at 500 Hz rising to normal through 2000 Hz sloping to moderate sensorineural hearing loss    Tympanogram:     RIGHT: normal eardrum mobility    LEFT:  normal eardrum mobility    Reflexes (reported by stimulus ear):  RIGHT: Ipsilateral: absent at frequencies tested  RIGHT: Contralateral: Could not test due to equipment failure  LEFT:   Ipsilateral: Could not test due to equipment failure  LEFT:   Contralateral: absent at frequencies tested      Speech Reception Threshold:    RIGHT: SDT was performed due to poor right ear thresholds   SDT: 60 dB HL    LEFT:   35 dB HL  Speech Reception Thresholds are in good agreement with pure tone thresholds    Word Recognition Score:     RIGHT: 16% at 95 dB HL using NU-6 recorded word list.    LEFT:   100% at 75 dB HL using NU-6 recorded word list.      ASSESSMENT:   Asymmetrical hearing loss was found today. Compared to patient's previous audiogram dated 11/24/2010, hearing has declined, bilaterally. Today s results were discussed with the patient in detail. She was given a copy of her audiogram     PLAN:   Patient was counseled regarding hearing loss and impact on communication.  Patient is a good candidate for amplification in the LEFT ear at this time.  It is recommended that the patient follow-up with ENT regarding difference in hearing and to return to the clinic for left hearing aid consultation, pending medical clearance.  Please call this clinic with questions regarding these results or recommendations.    Today's results will be send to Kayleen Webb, CCC-A  Licensed Audiologist  [place] #[id number]      04/08/22

## 2022-04-08 NOTE — LETTER
April 8, 2022      Marlyn Rubalcava  [address] [place] [address]        Dear Marlyn,    Just writing to let you know that I did see the results of your hearing test.  Thanks for getting that done as a baseline/starting audiogram with me.  I am happy to manage your Meniere's long term.    I agree that you should consider a hearing aid in the LEFT ear, and you certainly have my medical clearance to do so if you choose.    See you soon when we take care of your sinuses!      Sincerely,    Dane Lowery MD

## 2022-05-03 ENCOUNTER — OFFICE VISIT (OUTPATIENT)
Dept: FAMILY MEDICINE | Facility: CLINIC | Age: 62
End: 2022-05-03
Payer: COMMERCIAL

## 2022-05-03 VITALS
SYSTOLIC BLOOD PRESSURE: 114 MMHG | TEMPERATURE: 97.5 F | HEART RATE: 66 BPM | HEIGHT: 63 IN | DIASTOLIC BLOOD PRESSURE: 80 MMHG | OXYGEN SATURATION: 98 % | WEIGHT: 215 LBS | BODY MASS INDEX: 38.09 KG/M2

## 2022-05-03 DIAGNOSIS — Z01.818 PREOP GENERAL PHYSICAL EXAM: Primary | ICD-10-CM

## 2022-05-03 DIAGNOSIS — J32.8 OTHER CHRONIC SINUSITIS: ICD-10-CM

## 2022-05-03 DIAGNOSIS — E66.01 MORBID OBESITY (H): ICD-10-CM

## 2022-05-03 LAB
ALT SERPL W P-5'-P-CCNC: 49 U/L (ref 0–50)
ANION GAP SERPL CALCULATED.3IONS-SCNC: 8 MMOL/L (ref 3–14)
BUN SERPL-MCNC: 19 MG/DL (ref 7–30)
CALCIUM SERPL-MCNC: 9.7 MG/DL (ref 8.5–10.1)
CHLORIDE BLD-SCNC: 102 MMOL/L (ref 94–109)
CO2 SERPL-SCNC: 27 MMOL/L (ref 20–32)
CREAT SERPL-MCNC: 0.73 MG/DL (ref 0.52–1.04)
ERYTHROCYTE [DISTWIDTH] IN BLOOD BY AUTOMATED COUNT: 12.5 % (ref 10–15)
GFR SERPL CREATININE-BSD FRML MDRD: >90 ML/MIN/1.73M2
GLUCOSE BLD-MCNC: 106 MG/DL (ref 70–99)
HCT VFR BLD AUTO: 41.6 % (ref 35–47)
HGB BLD-MCNC: 13.9 G/DL (ref 11.7–15.7)
INR PPP: 1 (ref 0.85–1.15)
MCH RBC QN AUTO: 30.8 PG (ref 26.5–33)
MCHC RBC AUTO-ENTMCNC: 33.4 G/DL (ref 31.5–36.5)
MCV RBC AUTO: 92 FL (ref 78–100)
PLATELET # BLD AUTO: 186 10E3/UL (ref 150–450)
POTASSIUM BLD-SCNC: 3.7 MMOL/L (ref 3.4–5.3)
RBC # BLD AUTO: 4.52 10E6/UL (ref 3.8–5.2)
SODIUM SERPL-SCNC: 137 MMOL/L (ref 133–144)
WBC # BLD AUTO: 5.6 10E3/UL (ref 4–11)

## 2022-05-03 PROCEDURE — 99214 OFFICE O/P EST MOD 30 MIN: CPT | Performed by: FAMILY MEDICINE

## 2022-05-03 PROCEDURE — 36415 COLL VENOUS BLD VENIPUNCTURE: CPT | Performed by: FAMILY MEDICINE

## 2022-05-03 PROCEDURE — 84460 ALANINE AMINO (ALT) (SGPT): CPT | Performed by: FAMILY MEDICINE

## 2022-05-03 PROCEDURE — 85610 PROTHROMBIN TIME: CPT | Performed by: FAMILY MEDICINE

## 2022-05-03 PROCEDURE — 93000 ELECTROCARDIOGRAM COMPLETE: CPT | Performed by: FAMILY MEDICINE

## 2022-05-03 PROCEDURE — 80048 BASIC METABOLIC PNL TOTAL CA: CPT | Performed by: FAMILY MEDICINE

## 2022-05-03 PROCEDURE — 85027 COMPLETE CBC AUTOMATED: CPT | Performed by: FAMILY MEDICINE

## 2022-05-03 RX ORDER — HYDROCODONE BITARTRATE AND ACETAMINOPHEN 5; 325 MG/1; MG/1
1 TABLET ORAL EVERY 6 HOURS PRN
Qty: 18 TABLET | Refills: 0 | Status: SHIPPED | OUTPATIENT
Start: 2022-05-03 | End: 2022-05-08

## 2022-05-03 NOTE — PATIENT INSTRUCTIONS
Preparing for Your Surgery  Getting started  A nurse will call you to review your health history and instructions. They will give you an arrival time based on your scheduled surgery time. Please be ready to share:  Your doctor's clinic name and phone number  Your medical, surgical and anesthesia history  A list of allergies and sensitivities  A list of medicines, including herbal treatments and over-the-counter drugs  Whether the patient has a legal guardian (ask how to send us the papers in advance)  Please tell us if you're pregnant--or if there's any chance you might be pregnant. Some surgeries may injure a fetus (unborn baby), so they require a pregnancy test. Surgeries that are safe for a fetus don't always need a test, and you can choose whether to have one.   If you have a child who's having surgery, please ask for a copy of Preparing for Your Child's Surgery.    Preparing for surgery  Within 30 days of surgery: Have a pre-op exam (sometimes called an H&P, or History and Physical). This can be done at a clinic or pre-operative center.  If you're having a , you may not need this exam. Talk to your care team.  At your pre-op exam, talk to your care team about all medicines you take. If you need to stop any medicines before surgery, ask when to start taking them again.  We do this for your safety. Many medicines can make you bleed too much during surgery. Some change how well surgery (anesthesia) drugs work.  Call your insurance company to let them know you're having surgery. (If you don't have insurance, call [phone].)  Call your clinic if there's any change in your health. This includes signs of a cold or flu (sore throat, runny nose, cough, rash, fever). It also includes a scrape or scratch near the surgery site.  If you have questions on the day of surgery, call your hospital or surgery center.  COVID testing  You may need to be tested for COVID-19 before having surgery. If so, we will give  you instructions.  Eating and drinking guidelines  For your safety: Unless your surgeon tells you otherwise, follow the guidelines below.  Eat and drink as usual until 8 hours before surgery. After that, no food or milk.  Drink clear liquids until 2 hours before surgery. These are liquids you can see through, like water, Gatorade and Propel Water. You may also have black coffee and tea (no cream or milk).  Nothing by mouth within 2 hours of surgery. This includes gum, candy and breath mints.  If you drink alcohol: Stop drinking it the night before surgery.  If your care team tells you to take medicine on the morning of surgery, it's okay to take it with a sip of water.  Preventing infection  Shower or bathe the night before and morning of your surgery. Follow the instructions your clinic gave you. (If no instructions, use regular soap.)  Don't shave or clip hair near your surgery site. We'll remove the hair if needed.  Don't smoke or vape the morning of surgery. You may chew nicotine gum up to 2 hours before surgery. A nicotine patch is okay.  Note: Some surgeries require you to completely quit smoking and nicotine. Check with your surgeon.  Your care team will make every effort to keep you safe from infection. We will:  Clean our hands often with soap and water (or an alcohol-based hand rub).  Clean the skin at your surgery site with a special soap that kills germs.  Give you a special gown to keep you warm. (Cold raises the risk of infection.)  Wear special hair covers, masks, gowns and gloves during surgery.  Give antibiotic medicine, if prescribed. Not all surgeries need antibiotics.  What to bring on the day of surgery  Photo ID and insurance card  Copy of your health care directive, if you have one  Glasses and hearing aides (bring cases)  You can't wear contacts during surgery  Inhaler and eye drops, if you use them (tell us about these when you arrive)  CPAP machine or breathing device, if you use them  A  few personal items, if spending the night  If you have . . .  A pacemaker, ICD (cardiac defibrillator) or other implant: Bring the ID card.  An implanted stimulator: Bring the remote control.  A legal guardian: Bring a copy of the certified (court-stamped) guardianship papers.  Please remove any jewelry, including body piercings. Leave jewelry and other valuables at home.  If you're going home the day of surgery  You must have a responsible adult drive you home. They should stay with you overnight as well.  If you don't have someone to stay with you, and you aren't safe to go home alone, we may keep you overnight. Insurance often won't pay for this.  After surgery  If it's hard to control your pain or you need more pain medicine, please call your surgeon's office.  Questions?   If you have any questions for your care team, list them here: _________________________________________________________________________________________________________________________________________________________________________ ____________________________________ ____________________________________ ____________________________________  For informational purposes only. Not to replace the advice of your health care provider. Copyright   2003, 2019 [employer]. All rights reserved. Clinically reviewed by Erika Lai MD. [employer] [id number] - REV 07/21.    Call dr. Lowery's office about the antibiotic  Stop aspirin today   Do not take the triamterene/hydrochlorothiazide the morning of the surgery       No ibuprofen or aleve 5 days before

## 2022-05-03 NOTE — PROGRESS NOTES
[facility]  [id number] [facility] [address]  Phone: [phone]  Primary Provider: Carlie Ravi  Pre-op Performing Provider: CARLIE RAVI      PREOPERATIVE EVALUATION:  Today's date: 5/3/2022    Kenya Rubalcava is a 61 year old female who presents for a preoperative evaluation.    Surgical Information:  Surgery/Procedure: IMAGE GUIDED FUNCTIONAL ENDOSCOPIC SINUS SURGERY (FESS) WITHOUT SEPTOPLASTY.  TONSILLECTOMY AND possible ADENOIDECTOMY, ADULT  Surgery Location: [facility]  Surgeon: Dane Lowery MD  Surgery Date: 5/12/22  Time of Surgery: 8:30am  Where patient plans to recover: At home with family  Fax number for surgical facility: Note does not need to be faxed, will be available electronically in Epic.    Type of Anesthesia Anticipated: General    Assessment & Plan     The proposed surgical procedure is considered LOW risk.    Preop general physical exam    - Basic metabolic panel  (Ca, Cl, CO2, Creat, Gluc, K, Na, BUN); Future  - EKG 12-lead complete w/read - Clinics    Obesity      Other chronic sinusitis  Reason for the surgery   - Basic metabolic panel  (Ca, Cl, CO2, Creat, Gluc, K, Na, BUN); Future  - HYDROcodone-acetaminophen (NORCO) 5-325 MG tablet; Take 1 tablet by mouth every 6 hours as needed for pain  - CBC with platelets; Future  - INR; Future         Risks and Recommendations:  The patient has the following additional risks and recommendations for perioperative complications:   - No identified additional risk factors other than previously addressed    Medication Instructions:   - aspirin: Discontinue aspirin 7-10 days prior to procedure to reduce bleeding risk. It should be resumed postoperatively.    - Diuretics: HOLD on the day of surgery.   - Benzodiazepines: Continue without modification.    RECOMMENDATION:  APPROVAL GIVEN to proceed with proposed procedure, without further diagnostic evaluation.          Subjective      HPI related to upcoming procedure: chronic sinusitis       Preop Questions 5/3/2022   1. Have you ever had a heart attack or stroke? No   2. Have you ever had surgery on your heart or blood vessels, such as a stent placement, a coronary artery bypass, or surgery on an artery in your head, neck, heart, or legs? No   3. Do you have chest pain with activity? No   4. Do you have a history of  heart failure? No   5. Do you currently have a cold, bronchitis or symptoms of other infection? YES - with her sinus issues    6. Do you have a cough, shortness of breath, or wheezing? No   7. Do you or anyone in your family have previous history of blood clots? No   8. Do you or does anyone in your family have a serious bleeding problem such as prolonged bleeding following surgeries or cuts? No   9. Have you ever had problems with anemia or been told to take iron pills? No   10. Have you had any abnormal blood loss such as black, tarry or bloody stools, or abnormal vaginal bleeding? No   11. Have you ever had a blood transfusion? No   12. Are you willing to have a blood transfusion if it is medically needed before, during, or after your surgery? Yes   13. Have you or any of your relatives ever had problems with anesthesia? No   14. Do you have sleep apnea, excessive snoring or daytime drowsiness? No   15. Do you have any artifical heart valves or other implanted medical devices like a pacemaker, defibrillator, or continuous glucose monitor? No   16. Do you have artificial joints? No   17. Are you allergic to latex? No   18. Is there any chance that you may be pregnant? No     Health Care Directive:  Patient does not have a Health Care Directive or Living Will: Discussed advance care planning with patient; information given to patient to review.    Preoperative Review of :   reviewed - controlled substances reflected in medication list.      Status of Chronic Conditions:  See problem list for active medical problems.   Problems all longstanding and stable, except as noted/documented.  See ROS for pertinent symptoms related to these conditions.      Review of Systems  Constitutional, neuro, ENT, endocrine, pulmonary, cardiac, gastrointestinal, genitourinary, musculoskeletal, integument and psychiatric systems are negative, except as otherwise noted.    Patient Active Problem List    Diagnosis Date Noted     Chronic pansinusitis 03/29/2022     Priority: Medium     Added automatically from request for surgery [id number]       Chronic tonsillitis 03/29/2022     Priority: Medium     Added automatically from request for surgery [id number]       Obesity (BMI 35.0-39.9) with comorbidity (H) 07/19/2019     Priority: Medium     Primary insomnia 07/19/2019     Priority: Medium     Anxiety 05/14/2015     Priority: Medium     Meniere disease 11/11/2010     Priority: Medium     Diagnosed 10/10 - active in left ear. Trying dyazide daily, serax twice daily, meclizine qid        Kidney stones 06/15/2009     Priority: Medium     Bilateral noted on CT 5/23/09       Hypertriglyceridemia 06/15/2009     Priority: Medium     Hyperlipidemia LDL goal <130 06/30/2008     Priority: Medium     Recent Labs   Lab Test 11/26/10 0847 7/1/10 0927     CHOL 188 164     HDL 28* 27*     LDL Cannot estimate LDL when triglyceride exceeds 400 mg/dL93 Cannot estimate LDL when triglyceride exceeds 400 mg/dL     TRIG 443* 431*     CHOLHDLRATIO 7.0* 6.0*     11/29/10 - Just added fenofibrate and recheck in 2 months lipid/ast        Psoriasis 05/22/2008     Priority: Medium     Less than 1% body surface area - 1% ointment Triamcinalone, scalp Head&Shoulders or Nizoral [place]oot[place] FS prescriptions.       Symptomatic menopausal or female climacteric states 06/25/2007     Priority: Medium     Peripheral vertigo 09/06/2006     Priority: Medium     Problem list name updated by automated process. Provider to review       Essential hypertension, benign 05/02/2006     Priority: Medium  "    Anxiety disorder due to medical condition      Priority: Medium     Treatment tried:  Buspar 7.5 mg bid (stopped 03/21/2005), Celexa started 03/21/2005    Problem list name updated by automated process. Provider to review       Allergic rhinitis 06/16/2004     Priority: Medium     Environmental allergies  Problem list name updated by automated process. Provider to review       Sensorineural hearing loss 07/05/2002     Priority: Medium     Metal \"Esteem\" implant in the right posterior head  - placed approximately 2011    Referred to Dr Street, ENT Yefri Honeycutt by previous clinic  Problem list name updated by automated process. Provider to review          Past Medical History:   Diagnosis Date     Abnormal Papanicolaou smear of cervix and cervical HPV 1986     Anxiety      Anxiety disorder in conditions classified elsewhere      Calculus of kidney 08/11/1988    Calcium renal stone, oxylate     Calculus of ureter 06/30/1985    LEFT urteral stone     Excessive or frequent menstruation 07/07/2003     Labyrinthitis, unspecified      Lump or mass in breast 10/27/1999    RIGHT breast mass     Variants of migraine, not elsewhere classified, without mention of intractable migraine without mention of status migrainosus 02/07/2002     Past Surgical History:   Procedure Laterality Date     CARPAL TUNNEL RELEASE RT/LT  08/2008    Left      COLONOSCOPY  8/7/2012    Procedure: COLONOSCOPY;  Colonoscopy;  Surgeon: Kailyn Lopez MD;  Location: [facility]     CYSTOSCOPY, RETROGRADES, INSERT STENT URETER(S), COMBINED Left 8/19/2016    Procedure: COMBINED CYSTOSCOPY, RETROGRADES, INSERT STENT URETER(S);  Surgeon: Terence Carpenter MD;  Location: [facility]     esteem implant  06/23/2011    Hearing implant right ear.     HYSTERECTOMY, PAP NO LONGER INDICATED  12/2004    Vaginal hysterectomy     LAPAROSCOPIC ABLATION ENDOMETRIOSIS  07/2004     LASER HOLMIUM LITHOTRIPSY URETER(S), INSERT STENT, COMBINED Left 9/12/2016    " Procedure: COMBINED CYSTOSCOPY, URETEROSCOPY, LASER HOLMIUM LITHOTRIPSY URETER(S), INSERT STENT;  Surgeon: Kailyn Dozier MD;  Location: [facility]     LEFT wrist surgery       Sinus surgery with repair of deviated septum       TUBAL LIGATION       Current Outpatient Medications   Medication Sig Dispense Refill     ASPIRIN 325 MG OR TBEC one tablet by mouth daily (buffered)       atenolol (TENORMIN) 50 MG tablet TAKE 1 TABLET (50 MG) BY MOUTH ONCE DAILY 90 tablet 3     betamethasone dipropionate (DIPROSONE) 0.05 % external ointment Apply 1g  sparingly to psoriatic plaques  twice daily as needed.  1 tube per month Do not apply to face. 45 g 4     diazepam (VALIUM) 5 MG tablet TAKE 1 TABLET BY MOUTH EVERY 12 HOURS AS NEEDED FOR ANXIETY OR SLEEP. 30 tablet 0     DIPHENHYDRAMINE HCL PO Take 25 mg by mouth daily as needed (for Menieres disease)        fluocinonide (LIDEX) 0.05 % external solution Apply to scalp BID x 1-2 weeks PRN 50 mL 3     fluticasone (FLONASE) 50 MCG/ACT nasal spray Spray 1-2 sprays into both nostrils daily 16 g 3     meclizine (ANTIVERT) 25 MG tablet Take 2 tablets by mouth 2 times daily as needed. 60 tablet 0     pravastatin (PRAVACHOL) 40 MG tablet TAKE ONE TABLET BY MOUTH EVERY DAY AT BEDTIME 90 tablet 2     prochlorperazine (COMPAZINE) 10 MG tablet Take 0.5 tablets (5 mg) by mouth every 6 hours as needed for nausea or vomiting 10 tablet 1     traZODone (DESYREL) 100 MG tablet Take 2 tablets (200 mg) by mouth At Bedtime 180 tablet 3     triamterene-HCTZ (DYAZIDE) 37.5-25 MG capsule TAKE 1 CAPSULE BY MOUTH ONCE EVERY MORNING 90 capsule 3     venlafaxine (EFFEXOR-XR) 150 MG 24 hr capsule TAKE 1 CAPSULE (150 MG) BY MOUTH ONCE DAILY 90 capsule 3       Allergies   Allergen Reactions     Ivp Dye [Contrast Dye] Anaphylaxis and Hives     Cats Anaphylaxis     Niaspan [Niacin] Rash     Pollen Extract/Tree Extract         Social History     Tobacco Use     Smoking status: Never Smoker     Smokeless  "tobacco: Never Used   Substance Use Topics     Alcohol use: Yes     Alcohol/week: 0.0 standard drinks     Comment: rare        History   Drug Use No         Objective     /80   Pulse 66   Temp 97.5  F (36.4  C) (Tympanic)   Ht 1.6 m (5' 3\")   Wt 97.5 kg (215 lb)   SpO2 98%   BMI 38.09 kg/m      Physical Exam    GENERAL APPEARANCE: healthy, alert and no distress     EYES: EOMI, PERRL     HENT: ear canals and TM's normal and nose and mouth without ulcers or lesions     NECK: no adenopathy, no asymmetry, masses, or scars and thyroid normal to palpation     RESP: lungs clear to auscultation - no rales, rhonchi or wheezes     CV: regular rates and rhythm, normal S1 S2, no S3 or S4 and no murmur, click or rub     ABDOMEN:  soft, nontender, no HSM or masses and bowel sounds normal     MS: extremities normal- no gross deformities noted, no evidence of inflammation in joints, FROM in all extremities.     SKIN: no suspicious lesions or rashes     NEURO: Normal strength and tone, sensory exam grossly normal, mentation intact and speech normal     PSYCH: mentation appears normal. and affect normal/bright     LYMPHATICS: No cervical adenopathy    Recent Labs   Lab Test 10/05/21  1030 07/29/20  1026    139   POTASSIUM 4.2 3.9   CR 0.83 0.82        Diagnostics:  Labs pending at this time.  Results will be reviewed when available.   EKG: appears normal, NSR, normal axis, normal intervals, no acute ST/T changes c/w ischemia, no LVH by voltage criteria, unchanged from previous tracings    Revised Cardiac Risk Index (RCRI):  The patient has the following serious cardiovascular risks for perioperative complications:   - No serious cardiac risks = 0 points     RCRI Interpretation: 0 points: Class I (very low risk - 0.4% complication rate)           Signed Electronically by: Carlie Real MD  Copy of this evaluation report is provided to requesting physician.      "

## 2022-05-04 ENCOUNTER — TELEPHONE (OUTPATIENT)
Dept: OTOLARYNGOLOGY | Facility: CLINIC | Age: 62
End: 2022-05-04
Payer: COMMERCIAL

## 2022-05-04 DIAGNOSIS — J32.4 CHRONIC PANSINUSITIS: Primary | ICD-10-CM

## 2022-05-04 RX ORDER — PREDNISONE 10 MG/1
10 TABLET ORAL 2 TIMES DAILY
Qty: 20 TABLET | Refills: 2 | Status: SHIPPED | OUTPATIENT
Start: 2022-05-04 | End: 2022-05-09

## 2022-05-04 NOTE — TELEPHONE ENCOUNTER
Reason for Call:  Other sinus surgery     Detailed comments: patient is scheduled for sinus surgery on 5-12, with Dr. Lowery.  Had her preop yesterday and was diagnosed with a nasty sinus infection.  Dr. Real wanted her to check with Dr. Lowery to see if he wanted to prescribe an antibiotic.    Patient uses [facility] pharmacy in [place].    Phone Number Patient can be reached at: Cell number on file:    Telephone Information:   Mobile [phone]     Best Time: anytime    Can we leave a detailed message on this number? YES    Call taken on 5/4/2022 at 11:04 AM by Natalia Don

## 2022-05-05 ENCOUNTER — TELEPHONE (OUTPATIENT)
Dept: OTOLARYNGOLOGY | Facility: CLINIC | Age: 62
End: 2022-05-05
Payer: COMMERCIAL

## 2022-05-05 DIAGNOSIS — J32.4 CHRONIC PANSINUSITIS: Primary | ICD-10-CM

## 2022-05-05 NOTE — TELEPHONE ENCOUNTER
----- Message from Dane Lowery MD sent at 5/5/2022 12:32 PM CDT -----  Please call patient and let her know that I am using a special technology for her operation that will require a new, different CT scan of the sinuses than the scan she had.    Please have her get a new CT scan, by Tuesday if possible    Thanks  Jacinto Lowery

## 2022-05-05 NOTE — TELEPHONE ENCOUNTER
Called and spoke with patient. I gave her the Imaging number and she will call to schedule the CT, hopefully they will be able to get her in before Tuesday. Lucrecia Palmer CMA on 5/5/2022 at 1:04 PM

## 2022-05-06 NOTE — RESULT ENCOUNTER NOTE
Kenya,  Your lab results were normal/stable. Please feel free to my chart or call the office with questions. Carlie Real M.D.

## 2022-05-09 ENCOUNTER — LAB (OUTPATIENT)
Dept: LAB | Facility: CLINIC | Age: 62
End: 2022-05-09
Payer: COMMERCIAL

## 2022-05-09 DIAGNOSIS — Z11.59 ENCOUNTER FOR SCREENING FOR OTHER VIRAL DISEASES: ICD-10-CM

## 2022-05-09 PROCEDURE — U0003 INFECTIOUS AGENT DETECTION BY NUCLEIC ACID (DNA OR RNA); SEVERE ACUTE RESPIRATORY SYNDROME CORONAVIRUS 2 (SARS-COV-2) (CORONAVIRUS DISEASE [COVID-19]), AMPLIFIED PROBE TECHNIQUE, MAKING USE OF HIGH THROUGHPUT TECHNOLOGIES AS DESCRIBED BY CMS-2020-01-R: HCPCS

## 2022-05-09 PROCEDURE — U0005 INFEC AGEN DETEC AMPLI PROBE: HCPCS

## 2022-05-10 ENCOUNTER — TELEPHONE (OUTPATIENT)
Dept: OTOLARYNGOLOGY | Facility: CLINIC | Age: 62
End: 2022-05-10
Payer: COMMERCIAL

## 2022-05-10 ENCOUNTER — HOSPITAL ENCOUNTER (OUTPATIENT)
Dept: CT IMAGING | Facility: CLINIC | Age: 62
Discharge: HOME | End: 2022-05-10
Attending: OTOLARYNGOLOGY | Admitting: OTOLARYNGOLOGY
Payer: COMMERCIAL

## 2022-05-10 DIAGNOSIS — J32.4 CHRONIC PANSINUSITIS: ICD-10-CM

## 2022-05-10 LAB — SARS-COV-2 RNA RESP QL NAA+PROBE: NEGATIVE

## 2022-05-10 PROCEDURE — 70486 CT MAXILLOFACIAL W/O DYE: CPT

## 2022-05-10 NOTE — TELEPHONE ENCOUNTER
Returned call to pt and let her know that her imaging has a status of authorized    Britney GOLDSTEIN RN Specialty Triage 5/10/2022 12:32 PM

## 2022-05-10 NOTE — TELEPHONE ENCOUNTER
Reason for Call:  Other CT scan    Detailed comments: patient wondering status of insurance authorization for her CT today     Phone Number Patient can be reached at: Home number on file [phone] (home)    Best Time: any    Can we leave a detailed message on this number? YES    Call taken on 5/10/2022 at 8:41 AM by Evelyne Simms

## 2022-05-11 ENCOUNTER — ANESTHESIA EVENT (OUTPATIENT)
Dept: SURGERY | Facility: AMBULATORY SURGERY CENTER | Age: 62
End: 2022-05-11
Payer: COMMERCIAL

## 2022-05-12 ENCOUNTER — ANESTHESIA (OUTPATIENT)
Dept: SURGERY | Facility: AMBULATORY SURGERY CENTER | Age: 62
End: 2022-05-12
Payer: COMMERCIAL

## 2022-05-12 ENCOUNTER — HOSPITAL ENCOUNTER (OUTPATIENT)
Facility: AMBULATORY SURGERY CENTER | Age: 62
Discharge: HOME | End: 2022-05-12
Attending: OTOLARYNGOLOGY | Admitting: OTOLARYNGOLOGY
Payer: COMMERCIAL

## 2022-05-12 VITALS
WEIGHT: 215 LBS | RESPIRATION RATE: 8 BRPM | HEART RATE: 66 BPM | TEMPERATURE: 96.9 F | OXYGEN SATURATION: 93 % | BODY MASS INDEX: 38.09 KG/M2 | SYSTOLIC BLOOD PRESSURE: 115 MMHG | DIASTOLIC BLOOD PRESSURE: 56 MMHG

## 2022-05-12 DIAGNOSIS — J35.01 CHRONIC TONSILLITIS: ICD-10-CM

## 2022-05-12 DIAGNOSIS — J32.4 CHRONIC PANSINUSITIS: ICD-10-CM

## 2022-05-12 PROCEDURE — 31267 ENDOSCOPY MAXILLARY SINUS: CPT | Mod: 50 | Performed by: OTOLARYNGOLOGY

## 2022-05-12 PROCEDURE — 31254 NSL/SINS NDSC W/PRTL ETHMDCT: CPT | Mod: 50 | Performed by: OTOLARYNGOLOGY

## 2022-05-12 PROCEDURE — 31254 NSL/SINS NDSC W/PRTL ETHMDCT: CPT | Mod: 50

## 2022-05-12 PROCEDURE — 31240 NSL/SNS NDSC CNCH BULL RESCJ: CPT | Mod: 50 | Performed by: OTOLARYNGOLOGY

## 2022-05-12 PROCEDURE — 42826 REMOVAL OF TONSILS: CPT | Performed by: OTOLARYNGOLOGY

## 2022-05-12 PROCEDURE — 42826 REMOVAL OF TONSILS: CPT

## 2022-05-12 PROCEDURE — G8916 PT W IV AB GIVEN ON TIME: HCPCS

## 2022-05-12 PROCEDURE — 31240 NSL/SNS NDSC CNCH BULL RESCJ: CPT | Mod: 50

## 2022-05-12 PROCEDURE — 88304 TISSUE EXAM BY PATHOLOGIST: CPT | Performed by: PATHOLOGY

## 2022-05-12 PROCEDURE — G8907 PT DOC NO EVENTS ON DISCHARG: HCPCS

## 2022-05-12 PROCEDURE — 31267 ENDOSCOPY MAXILLARY SINUS: CPT | Mod: 50

## 2022-05-12 RX ORDER — OXYCODONE HYDROCHLORIDE 5 MG/1
5 TABLET ORAL EVERY 4 HOURS PRN
Status: DISCONTINUED | OUTPATIENT
Start: 2022-05-12 | End: 2022-05-13 | Stop reason: HOSPADM

## 2022-05-12 RX ORDER — ONDANSETRON 2 MG/ML
INJECTION INTRAMUSCULAR; INTRAVENOUS PRN
Status: DISCONTINUED | OUTPATIENT
Start: 2022-05-12 | End: 2022-05-12

## 2022-05-12 RX ORDER — ONDANSETRON 2 MG/ML
4 INJECTION INTRAMUSCULAR; INTRAVENOUS EVERY 30 MIN PRN
Status: DISCONTINUED | OUTPATIENT
Start: 2022-05-12 | End: 2022-05-13 | Stop reason: HOSPADM

## 2022-05-12 RX ORDER — ONDANSETRON 4 MG/1
4 TABLET, ORALLY DISINTEGRATING ORAL EVERY 30 MIN PRN
Status: DISCONTINUED | OUTPATIENT
Start: 2022-05-12 | End: 2022-05-13 | Stop reason: HOSPADM

## 2022-05-12 RX ORDER — SODIUM CHLORIDE, SODIUM LACTATE, POTASSIUM CHLORIDE, CALCIUM CHLORIDE 600; 310; 30; 20 MG/100ML; MG/100ML; MG/100ML; MG/100ML
INJECTION, SOLUTION INTRAVENOUS CONTINUOUS
Status: DISCONTINUED | OUTPATIENT
Start: 2022-05-12 | End: 2022-05-13 | Stop reason: HOSPADM

## 2022-05-12 RX ORDER — LIDOCAINE HYDROCHLORIDE AND EPINEPHRINE BITARTRATE 20; .01 MG/ML; MG/ML
INJECTION, SOLUTION SUBCUTANEOUS PRN
Status: DISCONTINUED | OUTPATIENT
Start: 2022-05-12 | End: 2022-05-12 | Stop reason: HOSPADM

## 2022-05-12 RX ORDER — CEFAZOLIN SODIUM 2 G/100ML
2 INJECTION, SOLUTION INTRAVENOUS
Status: COMPLETED | OUTPATIENT
Start: 2022-05-12 | End: 2022-05-12

## 2022-05-12 RX ORDER — OXYCODONE HYDROCHLORIDE 5 MG/1
5 TABLET ORAL EVERY 4 HOURS PRN
Qty: 42 TABLET | Refills: 0 | Status: SHIPPED | OUTPATIENT
Start: 2022-05-12 | End: 2022-05-12

## 2022-05-12 RX ORDER — FENTANYL CITRATE 50 UG/ML
INJECTION, SOLUTION INTRAMUSCULAR; INTRAVENOUS PRN
Status: DISCONTINUED | OUTPATIENT
Start: 2022-05-12 | End: 2022-05-12

## 2022-05-12 RX ORDER — PROPOFOL 10 MG/ML
INJECTION, EMULSION INTRAVENOUS PRN
Status: DISCONTINUED | OUTPATIENT
Start: 2022-05-12 | End: 2022-05-12

## 2022-05-12 RX ORDER — LIDOCAINE HYDROCHLORIDE AND EPINEPHRINE 10; 10 MG/ML; UG/ML
INJECTION, SOLUTION INFILTRATION; PERINEURAL PRN
Status: DISCONTINUED | OUTPATIENT
Start: 2022-05-12 | End: 2022-05-12 | Stop reason: HOSPADM

## 2022-05-12 RX ORDER — CEFAZOLIN SODIUM 2 G/100ML
2 INJECTION, SOLUTION INTRAVENOUS SEE ADMIN INSTRUCTIONS
Status: DISCONTINUED | OUTPATIENT
Start: 2022-05-12 | End: 2022-05-13 | Stop reason: HOSPADM

## 2022-05-12 RX ORDER — ACETAMINOPHEN 325 MG/1
975 TABLET ORAL ONCE
Status: COMPLETED | OUTPATIENT
Start: 2022-05-12 | End: 2022-05-12

## 2022-05-12 RX ORDER — COCAINE HYDROCHLORIDE 40 MG/ML
SOLUTION NASAL PRN
Status: DISCONTINUED | OUTPATIENT
Start: 2022-05-12 | End: 2022-05-12 | Stop reason: HOSPADM

## 2022-05-12 RX ORDER — AZITHROMYCIN 250 MG/1
TABLET, FILM COATED ORAL
Qty: 6 TABLET | Refills: 0 | Status: SHIPPED | OUTPATIENT
Start: 2022-05-12 | End: 2022-05-17

## 2022-05-12 RX ORDER — PHENYLEPHRINE HYDROCHLORIDE 10 MG/ML
INJECTION INTRAVENOUS PRN
Status: DISCONTINUED | OUTPATIENT
Start: 2022-05-12 | End: 2022-05-12

## 2022-05-12 RX ORDER — LIDOCAINE 40 MG/G
CREAM TOPICAL
Status: DISCONTINUED | OUTPATIENT
Start: 2022-05-12 | End: 2022-05-13 | Stop reason: HOSPADM

## 2022-05-12 RX ORDER — PROPOFOL 10 MG/ML
INJECTION, EMULSION INTRAVENOUS CONTINUOUS PRN
Status: DISCONTINUED | OUTPATIENT
Start: 2022-05-12 | End: 2022-05-12

## 2022-05-12 RX ORDER — FENTANYL CITRATE 50 UG/ML
25 INJECTION, SOLUTION INTRAMUSCULAR; INTRAVENOUS EVERY 5 MIN PRN
Status: DISCONTINUED | OUTPATIENT
Start: 2022-05-12 | End: 2022-05-13 | Stop reason: HOSPADM

## 2022-05-12 RX ORDER — HYDRALAZINE HYDROCHLORIDE 20 MG/ML
2.5-5 INJECTION INTRAMUSCULAR; INTRAVENOUS EVERY 10 MIN PRN
Status: DISCONTINUED | OUTPATIENT
Start: 2022-05-12 | End: 2022-05-13 | Stop reason: HOSPADM

## 2022-05-12 RX ORDER — OXYCODONE HYDROCHLORIDE 5 MG/1
5 TABLET ORAL EVERY 4 HOURS PRN
Qty: 42 TABLET | Refills: 0 | Status: SHIPPED | OUTPATIENT
Start: 2022-05-12 | End: 2022-05-19

## 2022-05-12 RX ORDER — METOPROLOL TARTRATE 1 MG/ML
1-2 INJECTION, SOLUTION INTRAVENOUS EVERY 5 MIN PRN
Status: DISCONTINUED | OUTPATIENT
Start: 2022-05-12 | End: 2022-05-13 | Stop reason: HOSPADM

## 2022-05-12 RX ORDER — DEXAMETHASONE SODIUM PHOSPHATE 10 MG/ML
10 INJECTION, SOLUTION INTRAMUSCULAR; INTRAVENOUS ONCE
Status: COMPLETED | OUTPATIENT
Start: 2022-05-12 | End: 2022-05-12

## 2022-05-12 RX ORDER — LIDOCAINE HYDROCHLORIDE 20 MG/ML
INJECTION, SOLUTION INFILTRATION; PERINEURAL PRN
Status: DISCONTINUED | OUTPATIENT
Start: 2022-05-12 | End: 2022-05-12

## 2022-05-12 RX ORDER — FENTANYL CITRATE 50 UG/ML
25 INJECTION, SOLUTION INTRAMUSCULAR; INTRAVENOUS
Status: DISCONTINUED | OUTPATIENT
Start: 2022-05-12 | End: 2022-05-13 | Stop reason: HOSPADM

## 2022-05-12 RX ADMIN — PROPOFOL 150 MG: 10 INJECTION, EMULSION INTRAVENOUS at 08:21

## 2022-05-12 RX ADMIN — FENTANYL CITRATE 25 MCG: 50 INJECTION, SOLUTION INTRAMUSCULAR; INTRAVENOUS at 10:32

## 2022-05-12 RX ADMIN — SODIUM CHLORIDE, SODIUM LACTATE, POTASSIUM CHLORIDE, CALCIUM CHLORIDE: 600; 310; 30; 20 INJECTION, SOLUTION INTRAVENOUS at 08:18

## 2022-05-12 RX ADMIN — Medication 40 MG: at 08:21

## 2022-05-12 RX ADMIN — FENTANYL CITRATE 25 MCG: 50 INJECTION, SOLUTION INTRAMUSCULAR; INTRAVENOUS at 10:26

## 2022-05-12 RX ADMIN — DEXAMETHASONE SODIUM PHOSPHATE 10 MG: 10 INJECTION, SOLUTION INTRAMUSCULAR; INTRAVENOUS at 08:26

## 2022-05-12 RX ADMIN — OXYCODONE HYDROCHLORIDE 5 MG: 5 TABLET ORAL at 10:54

## 2022-05-12 RX ADMIN — FENTANYL CITRATE 50 MCG: 50 INJECTION, SOLUTION INTRAMUSCULAR; INTRAVENOUS at 08:21

## 2022-05-12 RX ADMIN — FENTANYL CITRATE 25 MCG: 50 INJECTION, SOLUTION INTRAMUSCULAR; INTRAVENOUS at 10:49

## 2022-05-12 RX ADMIN — PHENYLEPHRINE HYDROCHLORIDE 100 MCG: 10 INJECTION INTRAVENOUS at 09:18

## 2022-05-12 RX ADMIN — CEFAZOLIN SODIUM 2 G: 2 INJECTION, SOLUTION INTRAVENOUS at 08:18

## 2022-05-12 RX ADMIN — FENTANYL CITRATE 50 MCG: 50 INJECTION, SOLUTION INTRAMUSCULAR; INTRAVENOUS at 08:35

## 2022-05-12 RX ADMIN — ACETAMINOPHEN 975 MG: 325 TABLET ORAL at 08:08

## 2022-05-12 RX ADMIN — ONDANSETRON 4 MG: 2 INJECTION INTRAMUSCULAR; INTRAVENOUS at 09:30

## 2022-05-12 RX ADMIN — LIDOCAINE HYDROCHLORIDE 60 MG: 20 INJECTION, SOLUTION INFILTRATION; PERINEURAL at 08:21

## 2022-05-12 RX ADMIN — PROPOFOL 200 MCG/KG/MIN: 10 INJECTION, EMULSION INTRAVENOUS at 08:21

## 2022-05-12 NOTE — ANESTHESIA CARE TRANSFER NOTE
Patient: Kenya Rubalcava    Procedure: Procedure(s):  IMAGE GUIDED FUNCTIONAL ENDOSCOPIC SINUS SURGERY (FESS) WITHOUT SEPTOPLASTY  TONSILLECTOMY       Diagnosis: Chronic pansinusitis [J32.4]  Chronic tonsillitis [J35.01]  Diagnosis Additional Information: No value filed.    Anesthesia Type:   General     Note:    Oropharynx: oropharynx clear of all foreign objects  Level of Consciousness: awake  Oxygen Supplementation: face mask    Independent Airway: airway patency satisfactory and stable  Dentition: dentition unchanged  Vital Signs Stable: post-procedure vital signs reviewed and stable  Report to RN Given: handoff report given  Patient transferred to: PACU    Handoff Report: Identifed the Patient, Identified the Reponsible Provider, Reviewed the pertinent medical history, Discussed the surgical course, Reviewed Intra-OP anesthesia mangement and issues during anesthesia, Set expectations for post-procedure period and Allowed opportunity for questions and acknowledgement of understanding      Vitals:  Vitals Value Taken Time   BP     Temp     Pulse 76 05/12/22 0952   Resp 14 05/12/22 0952   SpO2     Vitals shown include unvalidated device data.    Electronically Signed By: SUSY Salinas CRNA  May 12, 2022  9:53 AM

## 2022-05-12 NOTE — OP NOTE
PREOPERATIVE DIAGNOSES:   1. Chronic sinusitis.   2. Nasal obstruction.   3. Chronic tonsillitis  POSTOPERATIVE DIAGNOSES:   1. Chronic sinusitis.   2. Nasal obstruction.   3. Chronic tonsillitis  PROCEDURES PERFORMED:   1. Bilateral endoscopic maxillary antrostomy with tissue removal.   2. Bilateral endoscopic anterior ethmoidectomy.   3. Bilateral tonsillectomy  SURGEON: Dane Lowery MD   ASSISTANT: None  BLOOD LOSS: 30 mL.   COMPLICATIONS: None.   SPECIMENS: None.   ANESTHESIA: GETA.   INDICATIONS: Kenya Rubalcava  presented to me with a long history of chronic nasal disease and chronic sinusitis.  CT scan confirmed pansinusitis. Therefore, my recommendation was for the above-named procedures. IN addition, she had a history consistent with chronic tonsillitis, and therefore we agreed to remove them at the same procedure.  Preoperatively, risks discussed included the risks of infection, bleeding, the risks of general anesthesia, possible recurrence of sinus disease, possible injury to the eyes, base of skull and tear duct system, and possible alteration of sense of smell, although the patient has not had a sense of smell for many years. The patient understood these risks and possible outcomes and wished to proceed.   OPERATIVE PROCEDURE: After being taken to the operating room and induction of general endotracheal tube anesthesia, the bed was rotated 90 degrees.and we started with the tonsillectomy.    A shoulder roll and head turban were placed. I suspended the patient from the [place] stand using a Marvin-Terrence mouthgag, and I grasped the right tonsil with an Allis forceps and retracted medially and performed subcapsular dissection utilizing monopolar cautery, and the right tonsil came out very smoothly. I then turned my attention to the left side, once again using an Allis forceps to grasp it and retract it medially, and then I performed subcapsular dissection, and the left tonsil also came out very smoothly. I  released the mouthgag for 2 minutes to allow recirculation of blood to the tongue.   I resuspended the patient from the [place] stand using a Marvin-Terrence mouthgag, and there was good hemostasis. I applied a thin film of the hemostatic powder to the tonsil beds bilaterally and removed the mouthgag. The bed was rotated 90 degrees after I removed the shoulder roll and the tonsil portion was complete.    We now moved on to the nasal component of the surgery. I began by applying topical anesthetic in the form of 2 cottonoids on each side of the nose which had been soaked with a total of 4 mL of 4% liquid cocaine.   I removed the cottonoids from the right side of the nose and entered the right nasal cavity. I then entered with the spinal needle and 0 degree endoscope and injected the posterior lateral wall of the nasal cavity as well as the body of the right middle turbinate and the anterior insertion of the right middle turbinate. I proceeded to remove the lesvia bullosa that was obstructing the middle meatus and nasal airway.  I used a combination of shaver and straight thru cut forceps to marsupialize the hollow turbinate, resect ing the lateral half completely.  The dramatically opened the middle meatus.    I then proceeded with the sinus surgery, I used the rotating backbiter and trimmed away at the uncinate process to expose the natural ostium of the right maxillary sinus. I entered the right maxillary sinus with a 60 degree curved shaver blade and removed mucopurulence and polypoid tissue from the right maxillary sinus.   After this was done, I then confirmed the position of the ethmoid bulla. I switched back to the 12 degree shaver and a 0 degree endoscope and took down the face of the ethmoid bulla. I skeletonized the horizontal lamella and proceeded directly posteriorly through several layers of posterior ethmoid air cells which had no pneumatization whatsoever and were completely filled with polypoid mucosa and  inspissated secretions. After reaching the face of the sphenoid sinus on the right side, I had found the posterior extent of the roof of the ethmoid. I then was able to dissect in a posterior to anterior direction, removing polyps and bony septations along the way. Eventually I crossed the ethmoid infundibulum into the anterior ethmoid system.   At this point, I used the rotating backbiter to trim away at the anterior insertion of the right middle turbinate to expose the agger nasi cell. I used the 12 degree shaver to remove several more anterior ethmoid air cells.  At this point, I had completed the right-sided sinus surgery.     At this point, I turned my attention to the left nasal cavity.  I used a spinal needle with local anesthetic to inject the posterior lateral wall as well as the full length of the left inferior turbinate. I proceeded to remove the extremely large lesvia bullosa that was obstructing the middle meatus and nasal airway.  I used a combination of shaver and straight thru cut forceps to marsupialize the hollow turbinate, resect ing the lateral half completely.  The dramatically opened the middle meatus.  I was now able to proceed with the sinuses.  I used the rotating backbiter to trim away at the uncinate bone to expose the natural ostium of the left maxillary sinus. Once I identified it, I was able to use the sinus shaver to trim away at the remainder of the uncinate and open the left maxillary sinus. It was completely overgrown with polypoid mucosa and mucopurulence. I used a 60-degree sinus shaver and directly into the left maxillary sinus to trim away copious amounts of polypoid mucosa and mucopurulence.   After this was completely cleaned out, I switched back to the 12 degree shaver and a 0 degree endoscope. I took down the remnants of the face of the ethmoid bulla and skeletonized the horizontal lamella and proceeded directly posteriorly through several layers of posterior ethmoid cells  which were completely filled with polypoid mucosa and inspissated secretions. Eventually I reached the posterior most left ethmoid cell. I identified and confirmed that I had located the roof of the posterior ethmoid system. I then dissected in a posterior to anterior direction with the 12 degree shaver, trimming away diseased mucosa as well as bony septations. I crossed the ethmoid infundibulum into the anterior ethmoid system and, using the rotating backbiter, trimmed away at the superiormost remnant of the uncinate bone to identify the agger nasi cell. Using a shaver, I trimmed away at the remainder of the floor of the agger nasi, and it was also completely filled with polypoid mucosa.  I removed several more anterior ethmoid cells. At this point, I completed the sinuses on the left side.     At this point, the entire procedure was now complete. I reinspected both sides of the nose and there was good hemostasis. I applied Yusuf hemostatic powder to the roof of the ethmoid bilaterally. I then placed small pieces of Posisep dressing in between the middle turbinates and the lateral walls to prevent lateralization and scarring. All instruments were accounted for and all counts were correct. The patient's bed was rotated 90 degrees back to the care of anesthesia. He was awakened, extubated and sent to the recovery room in good condition.

## 2022-05-12 NOTE — ANESTHESIA PREPROCEDURE EVALUATION
Anesthesia Pre-Procedure Evaluation    Patient: Kenya Rubalcava   MRN: [id number] : 1960        Procedure : Procedure(s):  IMAGE GUIDED FUNCTIONAL ENDOSCOPIC SINUS SURGERY (FESS) WITHOUT SEPTOPLASTY  TONSILLECTOMY AND possible ADENOIDECTOMY, ADULT          Past Medical History:   Diagnosis Date     Abnormal Papanicolaou smear of cervix and cervical HPV      Anxiety      Anxiety disorder in conditions classified elsewhere      Calculus of kidney 1988    Calcium renal stone, oxylate     Calculus of ureter 1985    LEFT urteral stone     Excessive or frequent menstruation 2003     Labyrinthitis, unspecified      Lump or mass in breast 10/27/1999    RIGHT breast mass     Variants of migraine, not elsewhere classified, without mention of intractable migraine without mention of status migrainosus 2002      Past Surgical History:   Procedure Laterality Date     CARPAL TUNNEL RELEASE RT/LT  2008    Left      COLONOSCOPY  2012    Procedure: COLONOSCOPY;  Colonoscopy;  Surgeon: Kailyn Lopez MD;  Location: [facility]     CYSTOSCOPY, RETROGRADES, INSERT STENT URETER(S), COMBINED Left 2016    Procedure: COMBINED CYSTOSCOPY, RETROGRADES, INSERT STENT URETER(S);  Surgeon: Terence Carpenter MD;  Location: [facility]     esteem implant  2011    Hearing implant right ear.     HYSTERECTOMY, PAP NO LONGER INDICATED  2004    Vaginal hysterectomy     LAPAROSCOPIC ABLATION ENDOMETRIOSIS  2004     LASER HOLMIUM LITHOTRIPSY URETER(S), INSERT STENT, COMBINED Left 2016    Procedure: COMBINED CYSTOSCOPY, URETEROSCOPY, LASER HOLMIUM LITHOTRIPSY URETER(S), INSERT STENT;  Surgeon: Kailyn Dozier MD;  Location: [facility]     LEFT wrist surgery       Sinus surgery with repair of deviated septum       TUBAL LIGATION        Allergies   Allergen Reactions     Ivp Dye [Contrast Dye] Anaphylaxis and Hives     Cats Anaphylaxis     Niaspan [Niacin] Rash     Pollen  Extract/Tree Extract       Social History     Tobacco Use     Smoking status: Never Smoker     Smokeless tobacco: Never Used   Substance Use Topics     Alcohol use: Yes     Alcohol/week: 0.0 standard drinks     Comment: rare       Wt Readings from Last 1 Encounters:   05/12/22 97.5 kg (215 lb)        Anesthesia Evaluation            ROS/MED HX  ENT/Pulmonary: Comment: Chronic sinusitis, recurrent tonsillitis      Neurologic:       Cardiovascular:     (+) hypertension----- (-) murmur   METS/Exercise Tolerance: >4 METS    Hematologic:       Musculoskeletal:       GI/Hepatic:       Renal/Genitourinary:     (+) Nephrolithiasis ,     Endo:     (+) Obesity,     Psychiatric/Substance Use:     (+) psychiatric history anxiety     Infectious Disease:       Malignancy:       Other:            Physical Exam    Airway        Mallampati: II   TM distance: > 3 FB   Neck ROM: full   Mouth opening: > 3 cm    Respiratory Devices and Support         Dental  no notable dental history         Cardiovascular          Rhythm and rate: regular and normal (-) no murmur    Pulmonary   pulmonary exam normal        breath sounds clear to auscultation           OUTSIDE LABS:  CBC:   Lab Results   Component Value Date    WBC 5.6 05/03/2022    WBC 5.3 08/06/2019    HGB 13.9 05/03/2022    HGB 13.7 08/06/2019    HCT 41.6 05/03/2022    HCT 40.1 08/06/2019     05/03/2022     08/06/2019     BMP:   Lab Results   Component Value Date     05/03/2022     10/05/2021    POTASSIUM 3.7 05/03/2022    POTASSIUM 4.2 10/05/2021    CHLORIDE 102 05/03/2022    CHLORIDE 104 10/05/2021    CO2 27 05/03/2022    CO2 26 10/05/2021    BUN 19 05/03/2022    BUN 19 10/05/2021    CR 0.73 05/03/2022    CR 0.83 10/05/2021     (H) 05/03/2022     (H) 10/05/2021     COAGS:   Lab Results   Component Value Date    INR 1.00 05/03/2022     POC: No results found for: BGM, HCG, HCGS  HEPATIC:   Lab Results   Component Value Date    ALBUMIN 4.1  09/27/2018    PROTTOTAL 7.7 09/27/2018    ALT 49 05/03/2022    AST 39 09/27/2018    ALKPHOS 66 09/27/2018    BILITOTAL 0.3 09/27/2018     OTHER:   Lab Results   Component Value Date    PH 5 08/10/2001    A1C 5.6 06/19/2013    BIA 9.7 05/03/2022    PHOS 3.3 08/31/2016    MAG 1.8 07/20/2016    LIPASE 153 11/03/2012    TSH 2.92 05/04/2015    T4 0.81 07/19/2007    CRP 23.9 (H) 05/13/2011    SED 29 (H) 06/08/2011       Anesthesia Plan    ASA Status:  2   NPO Status:  NPO Appropriate    Anesthesia Type: General.     - Airway: ETT   Induction: Intravenous, Propofol.   Maintenance: Balanced.        Consents    Anesthesia Plan(s) and associated risks, benefits, and realistic alternatives discussed. Questions answered and patient/representative(s) expressed understanding.    - Discussed:     - Discussed with:  Patient      - Specific Concerns: sore throat, post op pain/nausea, dental damage, rare major complications.     - Extended Intubation/Ventilatory Support Discussed: No.      - Patient is DNR/DNI Status: No    Use of blood products discussed: No .     Postoperative Care    Pain management: IV analgesics, Oral pain medications, Multi-modal analgesia.   PONV prophylaxis: Ondansetron (or other 5HT-3), Dexamethasone or Solumedrol, Background Propofol Infusion     Comments:           H&P reviewed: Unable to attach H&P to encounter due to EHR limitations. H&P Update: appropriate H&P reviewed, patient examined. No interval changes since H&P (within 30 days).         Kristian Iniguez MD

## 2022-05-12 NOTE — ANESTHESIA POSTPROCEDURE EVALUATION
Patient: Kenya Rubalcava    Procedure: Procedure(s):  IMAGE GUIDED FUNCTIONAL ENDOSCOPIC SINUS SURGERY (FESS) WITHOUT SEPTOPLASTY  TONSILLECTOMY       Anesthesia Type:  General    Note:  Disposition: Outpatient   Postop Pain Control: Uneventful            Sign Out: Well controlled pain   PONV: No   Neuro/Psych: Uneventful            Sign Out: Acceptable/Baseline neuro status   Airway/Respiratory: Uneventful            Sign Out: Acceptable/Baseline resp. status   CV/Hemodynamics: Uneventful            Sign Out: Acceptable CV status; No obvious hypovolemia; No obvious fluid overload   Other NRE: NONE   DID A NON-ROUTINE EVENT OCCUR? No           Last vitals:  Vitals Value Taken Time   /56 05/12/22 1100   Temp 96.9  F (36.1  C) 05/12/22 0950   Pulse 66 05/12/22 1100   Resp 8 05/12/22 1100   SpO2 93 % 05/12/22 1100       Electronically Signed By: Kristian Iniguez MD  May 12, 2022  12:37 PM

## 2022-05-12 NOTE — ANESTHESIA PROCEDURE NOTES
Airway       Patient location during procedure: OR       Procedure Start/Stop Times: 5/12/2022 8:24 AM  Staff -        Performed By: CRNAIndications and Patient Condition       Indications for airway management: gus-procedural       Induction type:intravenous       Mask difficulty assessment: 1 - vent by mask    Final Airway Details       Final airway type: endotracheal airway       Successful airway: JONO and Oral  Endotracheal Airway Details        ETT size (mm): 7.0       Cuffed: yes       Successful intubation technique: direct laryngoscopy       DL Blade Type: Montano 2       Grade View of Cords: 1       Adjucts: stylet and tooth guard       Position: Center    Post intubation assessment        Placement verified by: capnometry and equal breath sounds        Number of attempts at approach: 1       Number of other approaches attempted: 0       Secured with: cloth tape       Ease of procedure: easy       Dentition: Intact    Medication(s) Administered   Medication Administration Time: 5/12/2022 8:24 AM

## 2022-05-12 NOTE — DISCHARGE INSTRUCTIONS
Postoperative Care for Tonsillectomy (with or without adenoidectomy)    Recovery - There are a handful of issues that routinely occur during recover that should be anticipated during your recovery.    The pain and swelling almost always gets worse before it gets better, this is normal.  Usually it peaks 3 to 5 days after the surgery, and then begins improving at 7 to 8 days after surgery.  Of course, this is variable from person to person.  The only dietary restriction is avoidance of hard or crunchy things until I see you in follow up.  If it makes a noise when you bite it, it is too hard.  Although it is good to begin eating again from day one, it is not unusual to not eat for several days after the procedure.  The most important thing is staying hydrated.  Drink fluids with electrolytes if possible, such as sports drinks.  The pain medication you were sent home with can make some people very nauseated.  To minimize this, avoid taking it on an empty stomach, or take smaller does with greater frequency.  For example if your dose is 2 teaspoons every four hours, try taking one teaspoon every two hours, etc.  Antibiotic are sometimes given after surgery, not to prevent infection, but some research shows that it helps to decrease pain.  This is not absolutely proven, and therefore is not absolutely necessary.   Try to stay ahead of the pain.  In other words, do not wait for pain medication to completely wear off before taking more pain medicine.  Instead, take the medication every 4 to 6 hours, even if it requires setting an alarm clock at night.  This is especially helpful during the first 5 days.  The uvula ( the small hanging object in the back of your mouth) frequently swells up after tonsillectomy, but will go back to normal.  This swelling can temporarily cause the sensation of something being stuck in your throat, it will go away with recovery.  Also, because of the arrangement of nerves under where the tonsils  were, sharp ear pain is very common during recovery, and will also go away with recovery.   With adenoidectomy, a very strong and foul-smelling odor can occur about 4-7 days after surgery.  This fades rapidly, and unless there is an associated fever no antibiotics are necessary.  It is very common after tonsillectomy to experience ear pain. This is due to nerves on the side of the throat becoming inflamed, and causing the perception of sudden episodes of ear pain.  This can be controlled with the same pain medication given for the surgery.     Activity - Avoid heavy lifting (greater than 20 pounds), strenuous exercise, or extremely cold environments until the follow up appointment.  Also, try to sleep with your head elevated.  An irritated cough from the breathing tube is fairly normal after surgery.    Medications - Except blood thinners, almost all medication can be re-started after tonsillectomy.      Complications - Bleeding is by far the most common complication after tonsillectomy.  If there are a few small drops or streaks of blood in the saliva that then goes away, this can be conservatively watched.  Gentle gargling with the ice water can also help stop this minor bleeding.  However, if the bleeding is persistent, or heavy bleeding occurs, do not hesitate.  Go to the emergency room to be evaluated.    Follow up - I like to see my patients about 2 weeks after the procedure to make sure that everything is healing appropriately.  Occasionally, there can be some longer - lasting side effects of surgery such as abnormal tongue sensations, or unusual swallowing.  However, if everything is healing well, the 2 week postoperative visit is all that will be necessary.    If there are any questions or issues with the above, or if there are other issues that concern you, always feel free to call the clinic and I am happy to speak with you as soon as I can.    Jacinto Lowery MD   Otolaryngology  [facility]  [phone] After hours, [facility] option     Instructions for Sinus Surgery    Recovery - Everyone recovers differently from a general anesthetic.  Symptoms such as fatigue, nausea, light-headedness, and sometimes a low grade fever (up to 100 degrees) are not unusual.  As your body removes the anesthetic drugs from circulation, these symptoms will resolve.  Your nose will be sore after surgery, and you may even have symptoms similar to a sinus infection with headache, congestion, and pressure.  These will resolve with healing.  For several days you may experience bloody drainage from the nose, please use the drip pad as necessary for this.  If there is persistent bleeding, please call the office during business hours or the on call ENT physician after hours.  There are no diet restrictions after sinus surgery, and you can resume your home medications.  Please blow your nose very gently for two weeks after surgery.  Limit your activity to no strenuous activities until I see you for the first follow-up visit.      Medications - You were sent home with narcotic pain medication.  If you can tolerate the discomfort during your recovery by using just plain Tylenol or ibuprofen (advil), please do so.  However, do not hesitate to use the stronger pain medication if needed.  If you were sent home with an antibiotic, it is primarily used to help the healing process.  If it causes loose bowel movements or other signs of intolerance, it is appropriate to discontinue it.  By far the most important measure you can take to speed recovery, and maximize the chances of long term success of sinus surgery is using the sinus rinses at least three time per day for the first month after surgery.   Please start these:     Tomorrow    Complications - Problems related to sinus surgery almost always are detected during the operation, and special instruction will be given in that situation.  However, unexpected  things can happen, and are all related to the structures around the sinus cavities.  Symptoms that should alert you to a possible problem include: severe eye pain or eye swelling, persistent heavy bleeding from the nose, and high fevers with headache and neck pain.  Any of these symptoms should be called into my office or to the on call ENT if after hours.  The most common non-emergency complication of sinus surgery is the formation of scar tissue which can re-block the sinuses.  This is addressed below.    Follow-up -  As you have noted, there are quite a few follow-up visits after sinus surgery.  This is done to aggressively manage the most common complication of this technique, which is scar tissue blocking the sinuses.  These visits will require the examination of your nose and possibly removal of crusts of dry mucous and blood, with possible removal of early scar tissue.  Please prepare for these visits by using your sinus rinses.    If there are any questions or issues with the above, or if there are other issues that concern you, always feel free to call the clinic and I am happy to speak with you as soon as I can.    Jacinto Lowery MD  Otolaryngology  [facility]  [phone] After hours, [facility] option     Tonsillectomy     What is a tonsillectomy?    Tonsillectomy is removal of the tonsils. Tonsils and adenoids are lumps of tissue at the back of the throat. The tonsils and adenoids fight infection. Your child may need the tonsillectomy if he has many bad colds, sore throats, or ear infections.   What can I expect after Surgery?    It is common to have an upset stomach and vomiting during the first 24 hours after surgery.    Your child s throat may be sore for two weeks, especially when eating. The soreness may get better after a few days and then get worse again. Your child s voice may change a little after surgery.    Ear pain is common, often when swallowing, because the ear  and throat have a common sensory nerve. Jaw spasms, or uncontrollable movement of the jaw, may also occur and cause pain.    Neck soreness is common after an adenoidectomy and usually last about one week.    Your child will have bad breath for a few weeks because your child s throat is swollen, snoring is common after surgery but should go away after about two weeks.    How should I care for my child?    Encourage your child to drink plenty of liquids (at least 2 -3 ounces per hour)  keeping the throat moist decreases discomfort and prevents dehydration( a  dangerous condition in which the body gets dried out.)    Give pain medication regularly within the limits directed by your doctor. Give  it before bed and first thing after waking in the morning. Try to give the   pain medication 30 minutes before meals to help make swallowing easier.    To prevent bleeding, avoid coughing, nose-blowing, clearing throat, and   spitting. Wipe nose gently if needed. When sneezing, encourage your child to   Open the mouth and make a sound, to prevent pressure buildup.    Avoid coming in contact with people who have colds, flu, or infections.      What can my child eat?    The day of surgery, give only cool, Clear liquids such as:    Apple Juice  Jell-o*  Yosi-aid*  Popsicles*  Flat pop (remove bubbles)  Water    If your child has an upset stomach, give small amounts often. Note: If   Your child vomits after drinking red liquids the vomit will be the same  color.    After the first day, when your child wants them add dairy and soft foods such as:  Ice cream  Milk shakes(use spoon)  Pudding  Smooth yogurt  Liquids are more important than food.    Be sure your child is drinking a lot.    When your child wants them, add soft foods (foods without rough  edges). See the chart for ideas. If a food is not on the list ask yourself:    Is it easy to chew? Is it free of coarse, rough, or crispy edges?  If the answer  is yes, your child can  probably eat it.    Be sure to cut foods very small and encourage your child to chew them  well. Continue the soft diet for 2 weeks after surgery Avoid citrus fruits and juices   such as orange juice and lemonade, as they may sting your child s throat. Avoid  foods that are hot in temperature or spicy hot.               May Eat Should not eat   - Soft bread  - Soggy waffles or   Sinhala toast (no crusts).  Soaked in syrup  - Pancakes  - Scrambled or   poached egg   - Toast  - Crispy waffles  - Fried foods   - Oatmeal,or   Creamy cereal  - Soggy cold cereal  (soaked in milk   - Crunchy cold   cereal   - Soup  - Hot dogs  - Hamburgers  - Tender, moist  meat  - Pasta, noodles  - Spaghetti-Os  - Macaroni and  Cheese   - Tough, dry meat,  chicken or fish   - Milk  - Custard, pudding  - Ice cream  - Malts, shakes  - Yogurt (smooth)  - Cottage cheese   - Cookies  - Crackers  - Pretzels  - Chips  - Popcorn  - Nuts   - Sandwiches, (no crusts)  - Smooth peanut butter   and jelly  - Processed cheese  - Tuna - Grilled cheese  sandwiches   - Cooked vegetables  - Mashed potatoes - Raw vegetables   - Tomatoes   - Applesauce  - Bananas  - Canned fruits  - Watermelon with out  seeds - Citrus fruits  - Moist fresh fruits   - Juices (not citrus)  - Yosi aid  - Flat pop (no bubbles)  - Jell-O - Citrus juices  - Pop with bubbles         While you were at the hospital today you received 975 mg Tylenol at 8:00 am. Maximum daily dosage is 4000 mg.    [facility]   Adult Discharge Orders & Instructions     For 24 hours after surgery    Get plenty of rest.  A responsible adult must stay with you for at least 24 hours after you leave the hospital.   Do not drive or use heavy equipment.  If you have weakness or tingling, don't drive or use heavy equipment until this feeling goes away.  Do not drink alcohol.  Avoid strenuous or risky activities.  Ask for help when climbing stairs.   You may feel lightheaded.  IF so, sit for a few  minutes before standing.  Have someone help you get up.   If you have nausea (feel sick to your stomach): Drink only clear liquids such as apple juice, ginger ale, broth or 7-Up.  Rest may also help.  Be sure to drink enough fluids.  Move to a regular diet as you feel able.  You may have a slight fever. Call the doctor if your fever is over 100 F (37.7 C) (taken under the tongue) or lasts longer than 24 hours.  You may have a dry mouth, a sore throat, muscle aches or trouble sleeping.  These should go away after 24 hours.  Do not make important or legal decisions.     Call your doctor for any of the followin.  Signs of infection (fever, growing tenderness at the surgery site, a large amount of drainage or bleeding, severe pain, foul-smelling drainage, redness, swelling).    2. It has been over 8 to 10 hours since surgery and you are still not able to urinate (pass water).    3.  Headache for over 24 hours.                 4. Signs of Covid-19 infection (temperature over 100 degrees, shortness of breath, cough, loss of taste/smell, generalized body aches, persistent headache,                  chills, sore throat, nausea/vomiting/diarrhea).

## 2022-05-16 LAB
PATH REPORT.COMMENTS IMP SPEC: NORMAL
PATH REPORT.COMMENTS IMP SPEC: NORMAL
PATH REPORT.FINAL DX SPEC: NORMAL
PATH REPORT.GROSS SPEC: NORMAL
PATH REPORT.MICROSCOPIC SPEC OTHER STN: NORMAL
PATH REPORT.RELEVANT HX SPEC: NORMAL
PHOTO IMAGE: NORMAL

## 2022-05-17 NOTE — PROGRESS NOTES
HPI - Kenya Rubalcava is a 61 year old female who is here for their first postoperative visit, status post endoscopic sinus surgery and tonsillectomy on 5/12/22.  They report the expected amount of congestion, facial pressure, and mild bloody drainage.  No changes in vision, no fevers or chills.  Nasal saline rinses and oral antibiotics have been tolerated.    Physical Exam  /82 (BP Location: Left arm, Patient Position: Sitting, Cuff Size: Adult Large)   Pulse 63   Wt 97.5 kg (215 lb)   SpO2 97%   BMI 38.09 kg/m      General - The patient is well nourished and well developed, and appears to have good nutritional status.  Alert and oriented to person and place, answers questions and cooperates with examination appropriately.   Head and Face - Normocephalic and atraumatic, with no gross asymmetry noted of the contour of the facial features.  The facial nerve is intact, with strong symmetric movements.  Eyes - Extraocular movements intact, and the pupils were reactive to light.  Sclera were not icteric or injected, conjunctiva were pink and moist.  Mouth - Examination of the oral cavity shows pink, healthy, moist mucosa.  No lesions or ulceration noted.  The dentition are in good repair.  The tongue is mobile and midline.  Mouth and Throat - Appropriate eschar bilaterally, with no signs of clot or bleeding in the tonsil beds.  Uvula is midline.  Nose - Nasal exam performed with a headlight and nasal speculum.  I suctioned out a small amount of residual nasopore and dark mucous.  The middle meatus was visible and open bilaterally, no purulence or signs of early synechiae formation.    A/P - Kenya Rubalcava is a 61 year old female is doing well from sinus surgery.  There are no signs of complications or infection.  Patient instructed to continue saline rinses.  I will see them in 1 week for endoscopically assisted debridement of the sinuses.

## 2022-05-19 ENCOUNTER — TELEPHONE (OUTPATIENT)
Dept: FAMILY MEDICINE | Facility: CLINIC | Age: 62
End: 2022-05-19

## 2022-05-19 ENCOUNTER — OFFICE VISIT (OUTPATIENT)
Dept: OTOLARYNGOLOGY | Facility: CLINIC | Age: 62
End: 2022-05-19
Payer: COMMERCIAL

## 2022-05-19 VITALS
WEIGHT: 215 LBS | BODY MASS INDEX: 38.09 KG/M2 | SYSTOLIC BLOOD PRESSURE: 130 MMHG | HEART RATE: 63 BPM | DIASTOLIC BLOOD PRESSURE: 82 MMHG | OXYGEN SATURATION: 97 %

## 2022-05-19 DIAGNOSIS — J32.4 CHRONIC PANSINUSITIS: ICD-10-CM

## 2022-05-19 DIAGNOSIS — J35.01 CHRONIC TONSILLITIS: ICD-10-CM

## 2022-05-19 PROCEDURE — 99024 POSTOP FOLLOW-UP VISIT: CPT | Performed by: OTOLARYNGOLOGY

## 2022-05-19 RX ORDER — OXYCODONE HYDROCHLORIDE 5 MG/1
5 TABLET ORAL EVERY 4 HOURS PRN
Qty: 42 TABLET | Refills: 0 | Status: SHIPPED | OUTPATIENT
Start: 2022-05-19 | End: 2022-05-19

## 2022-05-19 RX ORDER — OXYCODONE HYDROCHLORIDE 5 MG/1
5 TABLET ORAL EVERY 4 HOURS PRN
Qty: 42 TABLET | Refills: 0 | Status: SHIPPED | OUTPATIENT
Start: 2022-05-19 | End: 2022-10-18

## 2022-05-19 NOTE — NURSING NOTE
"Chief Complaint   Patient presents with     Surgical Followup       Vitals:    05/19/22 1550   BP: 130/82   BP Location: Left arm   Patient Position: Sitting   Cuff Size: Adult Large   Pulse: 63   SpO2: 97%   Weight: 97.5 kg (215 lb)     Wt Readings from Last 1 Encounters:   05/19/22 97.5 kg (215 lb)     Ht Readings from Last 1 Encounters:   05/03/22 1.6 m (5' 3\")       Lucrecia Bartholomew [facility], 5/19/2022 3:53 PM    "

## 2022-05-19 NOTE — TELEPHONE ENCOUNTER
Med refilled by Dr. Lowery. Called patient to notify but there was no answer. Left detailed VM and told her to call back if she had any further questions.     Heather LORENZANA RN, [facility] 05/19/22 10:41 AM

## 2022-05-19 NOTE — TELEPHONE ENCOUNTER
Pt calling back in stating that shes at the peak of her pain and needs her Oxycodone ( MG not specified) refilled preferably before her appt today with Dr Lowery @ 3:45pm . Please advise and reach out to pt

## 2022-05-19 NOTE — LETTER
5/19/2022         RE: Kenya Rubalcava  [address] [place] [address]        Dear Colleague,    Thank you for referring your patient, Kenya Rubalcava, to the [facility]. Please see a copy of my visit note below.    HPI - Kenya Rubalcava is a 61 year old female who is here for their first postoperative visit, status post endoscopic sinus surgery and tonsillectomy on 5/12/22.  They report the expected amount of congestion, facial pressure, and mild bloody drainage.  No changes in vision, no fevers or chills.  Nasal saline rinses and oral antibiotics have been tolerated.    Physical Exam  /82 (BP Location: Left arm, Patient Position: Sitting, Cuff Size: Adult Large)   Pulse 63   Wt 97.5 kg (215 lb)   SpO2 97%   BMI 38.09 kg/m      General - The patient is well nourished and well developed, and appears to have good nutritional status.  Alert and oriented to person and place, answers questions and cooperates with examination appropriately.   Head and Face - Normocephalic and atraumatic, with no gross asymmetry noted of the contour of the facial features.  The facial nerve is intact, with strong symmetric movements.  Eyes - Extraocular movements intact, and the pupils were reactive to light.  Sclera were not icteric or injected, conjunctiva were pink and moist.  Mouth - Examination of the oral cavity shows pink, healthy, moist mucosa.  No lesions or ulceration noted.  The dentition are in good repair.  The tongue is mobile and midline.  Mouth and Throat - Appropriate eschar bilaterally, with no signs of clot or bleeding in the tonsil beds.  Uvula is midline.  Nose - Nasal exam performed with a headlight and nasal speculum.  I suctioned out a small amount of residual nasopore and dark mucous.  The middle meatus was visible and open bilaterally, no purulence or signs of early synechiae formation.    A/P - Kenya Rubalcava is a 61 year old female is doing well from sinus  surgery.  There are no signs of complications or infection.  Patient instructed to continue saline rinses.  I will see them in 1 week for endoscopically assisted debridement of the sinuses.        Again, thank you for allowing me to participate in the care of your patient.        Sincerely,        Dane Lowery MD

## 2022-05-19 NOTE — TELEPHONE ENCOUNTER
"Patient called requesting help from Dr Real in obtaining Oxycodone.  Patient states she has tonsillectomy 5/12/22 Dr Lowery and is still having severe pain.  Patient dropped off her Oxycodone prescription yesterday at [facility] and was told today that they do not have this medication in stock and unsure when it will arrive.  Patient is asking Dr Real to prescribe \"a few tablets\" of Oxycodone so she doesn't \"stop cold turkey\".  Advised patient to contact [facility] pharmacy that she dropped of her prescription at.  Ask if they are able to see what [facility] pharmacies has a supply of the Oxycodone and  the script and bring to alternate [facility] pharmacy.  Patient agrees with this plan.  Kary Costello RN     "

## 2022-05-26 NOTE — PROGRESS NOTES
Post Op Sinus 2    HPI - Kenya Rubalcava is here for their second postoperative visit, status post endoscopic sinus surgery and tonsillectomy on 5/12/22..  There was the expected amount of congestion which has now mostly resolved, and no bleeding has occurred.  The generalized facial pressure has been resolving, and there have been no fevers or chills since the first postoperative visit.  The sinus rinses are continuing three times per day, and are being tolerated well.  No visual changes.    Physical Exam -   /85 (BP Location: Right arm, Patient Position: Sitting, Cuff Size: Adult Large)   Pulse 59   Wt 97.5 kg (215 lb)   SpO2 98%   BMI 38.09 kg/m      General - The patient is awake and alert, and answers questions appropriately during the history and physical.  The vocal quality is hypernasal, but there is no dyspnea or stridor noted.  Eyes - The EOMI, there is no conjuncitval or scleral injection.  Pupils are equally round and reactive to light.  Oral - The oral mucosa is pink and moist.  The tongue is mobile and midline on protrusion, no edema noted.  Nasal - The nasal examination was done with a rigid nasal endoscope today for the purposes of bilateral endoscopically assisted debridement of the sinuses.  I began by spraying both sides with lidocaine and neosynephrine.  Color photographs were taken for the medical record.  I began on the left side.  The middle meatus was noted to obstructed with a dark crust, this was gently dislodged from the lateral wall with a number 7 suction, I was then able to visualize the left maxillary sinusotomy, it is healing well and open.  No purulence noted.  I then moved further back, and debrided some dark crusts and thick dark mucous away from the ethmoid roof.  The roof was then visualized and is healing well.  I turned my attention to the right side.  Once again some dark crust was dislodged from the middle meatus and removed from the nose.  I was then able to  pass the scope into the right middle meatus.  The maxillary sinusotomy is healing well, no abnormal secretions noted.  Further back, I debrided some crusts from the lateral wall and roof of the ethmoid system.  I was then able to visualize a nicely remucosalizing surface.  Bialterally, no early synechiae or touch points were noted.    A/P - Kenya Rubalcava is status post endoscopic nasal surgery and does not show any signs of complications.  I will see the patient back in one month for the third postoperative visit.  They have no restrictions at this point, and should call if there are any signs of sinusitis.

## 2022-05-31 NOTE — PATIENT INSTRUCTIONS
LOV 12.8.21 Plan:  - Today's Plan of Care:  Continue with rest and ice  Use Heel cups or brace as needed  Use crutches as needed    -We also discussed other future treatment options:  Rehab: Physical Therapy  Medical Equipment: walking boot  Shoe inserts    Follow Up: as needed    If you have any further questions for your physician or physician s care team you can call [phone] and use option 3 to leave a voice message. Calls received during business hours will be returned same day.

## 2022-06-01 ENCOUNTER — TELEPHONE (OUTPATIENT)
Dept: FAMILY MEDICINE | Facility: CLINIC | Age: 62
End: 2022-06-01
Payer: COMMERCIAL

## 2022-06-01 DIAGNOSIS — Z12.11 SCREENING FOR COLON CANCER: Primary | ICD-10-CM

## 2022-06-02 ENCOUNTER — OFFICE VISIT (OUTPATIENT)
Dept: OTOLARYNGOLOGY | Facility: CLINIC | Age: 62
End: 2022-06-02
Payer: COMMERCIAL

## 2022-06-02 VITALS
BODY MASS INDEX: 38.09 KG/M2 | WEIGHT: 215 LBS | HEART RATE: 59 BPM | OXYGEN SATURATION: 98 % | DIASTOLIC BLOOD PRESSURE: 85 MMHG | SYSTOLIC BLOOD PRESSURE: 129 MMHG

## 2022-06-02 DIAGNOSIS — J32.4 CHRONIC PANSINUSITIS: Primary | ICD-10-CM

## 2022-06-02 PROCEDURE — 99024 POSTOP FOLLOW-UP VISIT: CPT | Mod: 24 | Performed by: OTOLARYNGOLOGY

## 2022-06-02 PROCEDURE — 31237 NSL/SINS NDSC SURG BX POLYPC: CPT | Mod: 50 | Performed by: OTOLARYNGOLOGY

## 2022-06-02 NOTE — NURSING NOTE
"Chief Complaint   Patient presents with     Surgical Followup       Vitals:    06/02/22 1546   BP: 129/85   BP Location: Right arm   Patient Position: Sitting   Cuff Size: Adult Large   Pulse: 59   SpO2: 98%   Weight: 97.5 kg (215 lb)     Wt Readings from Last 1 Encounters:   06/02/22 97.5 kg (215 lb)     Ht Readings from Last 1 Encounters:   05/03/22 1.6 m (5' 3\")       Lucrecia Bartholomew [facility], 6/2/2022 3:47 PM    "

## 2022-06-02 NOTE — LETTER
6/2/2022         RE: Kenya Rubalcava  [address] [place] [address]        Dear Colleague,    Thank you for referring your patient, Kenya Rubalcava, to the [facility]. Please see a copy of my visit note below.    Post Op Sinus 2    HPI - Kenya Rubalcava is here for their second postoperative visit, status post endoscopic sinus surgery and tonsillectomy on 5/12/22..  There was the expected amount of congestion which has now mostly resolved, and no bleeding has occurred.  The generalized facial pressure has been resolving, and there have been no fevers or chills since the first postoperative visit.  The sinus rinses are continuing three times per day, and are being tolerated well.  No visual changes.    Physical Exam -   /85 (BP Location: Right arm, Patient Position: Sitting, Cuff Size: Adult Large)   Pulse 59   Wt 97.5 kg (215 lb)   SpO2 98%   BMI 38.09 kg/m      General - The patient is awake and alert, and answers questions appropriately during the history and physical.  The vocal quality is hypernasal, but there is no dyspnea or stridor noted.  Eyes - The EOMI, there is no conjuncitval or scleral injection.  Pupils are equally round and reactive to light.  Oral - The oral mucosa is pink and moist.  The tongue is mobile and midline on protrusion, no edema noted.  Nasal - The nasal examination was done with a rigid nasal endoscope today for the purposes of bilateral endoscopically assisted debridement of the sinuses.  I began by spraying both sides with lidocaine and neosynephrine.  Color photographs were taken for the medical record.  I began on the left side.  The middle meatus was noted to obstructed with a dark crust, this was gently dislodged from the lateral wall with a number 7 suction, I was then able to visualize the left maxillary sinusotomy, it is healing well and open.  No purulence noted.  I then moved further back, and debrided some dark crusts and  thick dark mucous away from the ethmoid roof.  The roof was then visualized and is healing well.  I turned my attention to the right side.  Once again some dark crust was dislodged from the middle meatus and removed from the nose.  I was then able to pass the scope into the right middle meatus.  The maxillary sinusotomy is healing well, no abnormal secretions noted.  Further back, I debrided some crusts from the lateral wall and roof of the ethmoid system.  I was then able to visualize a nicely remucosalizing surface.  Bialterally, no early synechiae or touch points were noted.    A/P - Kenyase EWA Rubalcava is status post endoscopic nasal surgery and does not show any signs of complications.  I will see the patient back in one month for the third postoperative visit.  They have no restrictions at this point, and should call if there are any signs of sinusitis.              Again, thank you for allowing me to participate in the care of your patient.        Sincerely,        Dane Lowery MD

## 2022-06-22 NOTE — PROGRESS NOTES
Post Op Sinus 3    History of Present Illness - Kenya Rubalcava is a 61 year old female status post endoscopic sinus surgery and tonsillectomy on 5/12/22. This is her third post op visit.    There was the expected amount of congestion which has now mostly resolved, and no bleeding has occurred.  The generalized facial pressure has resolved, and there have been no fevers or chills since the second postoperative visit.  The sinus rinses are continuing daily, and are being tolerated well.  No visual changes.    She tells me that the irrigations do seem to trigger some dizziness.  But also she notes that in the middle of the day she can still feel pressure in the face.    Physical Exam -   /74   Pulse 74   Wt 94.8 kg (209 lb)   BMI 37.02 kg/m      General - The patient is awake and alert, and answers questions appropriately during the history and physical.  The vocal quality is hypernasal, but there is no dyspnea or stridor noted.  Eyes - The EOMI, there is no conjuncitval or scleral injection.  Pupils are equally round and reactive to light.  Oral - The oral mucosa is pink and moist.  The tongue is mobile and midline on protrusion, no edema noted.    To evaluate the nose in the postoperative state I performed rigid nasal endoscopy.  I first sprayed with lidocaine and neosynephrine.  I began with the LEFT side using a 2.7mm 30 degree rigid nasal endoscope, and color photographs were taken for the medical record.    The middle meatus was open, and I was able to pass the scope through.  The LEFT maxillary antrostomy is open, and looking down and into the LEFT maxillary sinus, the mucosa looks healthy, no abnormal secretions.  Going further back, the ethmoid roof is nicely re-mucosalized, and there is no abnormal secretions or polypoid degeneration.    The right side was then examined.  The middle meatus was open and I visualized the RIGHT antrostomy was open.  Looking down into the RIGHT maxillary sinus, the  mucosa was flat and healthy in appearance.  Going further back the ethmoid system looks good.  The mucosa is healthy, and there were polyps or polypoid degenerations.        A/P - Kenya Rubalcava is status post endoscopic nasal surgery and does not show any signs of complications.  I will see the patient back in three months for the fourth remote postoperative visit.  They have no restrictions at this point, and should call if there are any signs of sinusitis.      We will try adding budesonide to her NeilMed saline irrigations.  Continue an oral antihistamine with Xyzal

## 2022-06-27 ENCOUNTER — OFFICE VISIT (OUTPATIENT)
Dept: OTOLARYNGOLOGY | Facility: CLINIC | Age: 62
End: 2022-06-27
Payer: COMMERCIAL

## 2022-06-27 VITALS
WEIGHT: 209 LBS | DIASTOLIC BLOOD PRESSURE: 74 MMHG | SYSTOLIC BLOOD PRESSURE: 114 MMHG | HEART RATE: 74 BPM | BODY MASS INDEX: 37.02 KG/M2

## 2022-06-27 DIAGNOSIS — J30.2 SEASONAL ALLERGIC RHINITIS, UNSPECIFIED TRIGGER: ICD-10-CM

## 2022-06-27 DIAGNOSIS — J32.4 CHRONIC PANSINUSITIS: Primary | ICD-10-CM

## 2022-06-27 PROCEDURE — 99024 POSTOP FOLLOW-UP VISIT: CPT | Performed by: OTOLARYNGOLOGY

## 2022-06-27 RX ORDER — BUDESONIDE 0.5 MG/2ML
INHALANT ORAL
Qty: 60 ML | Refills: 11 | Status: SHIPPED | OUTPATIENT
Start: 2022-06-27

## 2022-06-27 RX ORDER — LEVOCETIRIZINE DIHYDROCHLORIDE 5 MG/1
5 TABLET, FILM COATED ORAL EVERY EVENING
Qty: 90 TABLET | Refills: 3 | Status: SHIPPED | OUTPATIENT
Start: 2022-06-27 | End: 2023-09-07

## 2022-06-27 NOTE — LETTER
6/27/2022         RE: Kenya Rubalcava  [address] [place] [address]        Dear Colleague,    Thank you for referring your patient, Kenya Rubalcava, to the [facility]. Please see a copy of my visit note below.    Post Op Sinus 3    History of Present Illness - Kenya Rubalcava is a 61 year old female status post endoscopic sinus surgery and tonsillectomy on 5/12/22. This is her third post op visit.    There was the expected amount of congestion which has now mostly resolved, and no bleeding has occurred.  The generalized facial pressure has resolved, and there have been no fevers or chills since the second postoperative visit.  The sinus rinses are continuing daily, and are being tolerated well.  No visual changes.    She tells me that the irrigations do seem to trigger some dizziness.  But also she notes that in the middle of the day she can still feel pressure in the face.    Physical Exam -   /74   Pulse 74   Wt 94.8 kg (209 lb)   BMI 37.02 kg/m      General - The patient is awake and alert, and answers questions appropriately during the history and physical.  The vocal quality is hypernasal, but there is no dyspnea or stridor noted.  Eyes - The EOMI, there is no conjuncitval or scleral injection.  Pupils are equally round and reactive to light.  Oral - The oral mucosa is pink and moist.  The tongue is mobile and midline on protrusion, no edema noted.    To evaluate the nose in the postoperative state I performed rigid nasal endoscopy.  I first sprayed with lidocaine and neosynephrine.  I began with the LEFT side using a 2.7mm 30 degree rigid nasal endoscope, and color photographs were taken for the medical record.    The middle meatus was open, and I was able to pass the scope through.  The LEFT maxillary antrostomy is open, and looking down and into the LEFT maxillary sinus, the mucosa looks healthy, no abnormal secretions.  Going further back, the ethmoid  roof is nicely re-mucosalized, and there is no abnormal secretions or polypoid degeneration.    The right side was then examined.  The middle meatus was open and I visualized the RIGHT antrostomy was open.  Looking down into the RIGHT maxillary sinus, the mucosa was flat and healthy in appearance.  Going further back the ethmoid system looks good.  The mucosa is healthy, and there were polyps or polypoid degenerations.        A/P - Kenya Rubalcava is status post endoscopic nasal surgery and does not show any signs of complications.  I will see the patient back in three months for the fourth remote postoperative visit.  They have no restrictions at this point, and should call if there are any signs of sinusitis.      We will try adding budesonide to her NeilMed saline irrigations.  Continue an oral antihistamine with Xyzal        Again, thank you for allowing me to participate in the care of your patient.        Sincerely,        Dane Lowery MD

## 2022-08-04 ENCOUNTER — ANESTHESIA EVENT (OUTPATIENT)
Dept: GASTROENTEROLOGY | Facility: CLINIC | Age: 62
End: 2022-08-04
Payer: COMMERCIAL

## 2022-08-04 NOTE — ANESTHESIA PREPROCEDURE EVALUATION
Anesthesia Pre-Procedure Evaluation    Patient: Kenya Rubalcava   MRN: [id number] : 1960        Procedure : Procedure(s):  COLONOSCOPY          Past Medical History:   Diagnosis Date     Abnormal Papanicolaou smear of cervix and cervical HPV      Anxiety      Anxiety disorder in conditions classified elsewhere      Calculus of kidney 1988    Calcium renal stone, oxylate     Calculus of ureter 1985    LEFT urteral stone     Excessive or frequent menstruation 2003     Labyrinthitis, unspecified      Lump or mass in breast 10/27/1999    RIGHT breast mass     Variants of migraine, not elsewhere classified, without mention of intractable migraine without mention of status migrainosus 2002      Past Surgical History:   Procedure Laterality Date     CARPAL TUNNEL RELEASE RT/LT  2008    Left      COLONOSCOPY  2012    Procedure: COLONOSCOPY;  Colonoscopy;  Surgeon: Kailyn Lopez MD;  Location: [facility]     CYSTOSCOPY, RETROGRADES, INSERT STENT URETER(S), COMBINED Left 2016    Procedure: COMBINED CYSTOSCOPY, RETROGRADES, INSERT STENT URETER(S);  Surgeon: Terence Carpenter MD;  Location:  OR     esteem implant  2011    Hearing implant right ear.     HYSTERECTOMY, PAP NO LONGER INDICATED  2004    Vaginal hysterectomy     LAPAROSCOPIC ABLATION ENDOMETRIOSIS  2004     LASER HOLMIUM LITHOTRIPSY URETER(S), INSERT STENT, COMBINED Left 2016    Procedure: COMBINED CYSTOSCOPY, URETEROSCOPY, LASER HOLMIUM LITHOTRIPSY URETER(S), INSERT STENT;  Surgeon: Kailyn Dozier MD;  Location:  OR     LEFT wrist surgery       OPTICAL TRACKING SYSTEM ENDOSCOPIC SINUS SURGERY Bilateral 2022    Procedure: IMAGE GUIDED FUNCTIONAL BILATERAL  ENDOSCOPIC SINUS SURGERY (FESS) WITHOUT SEPTOPLASTY;  Surgeon: Dane Lowery MD;  Location:  OR     Sinus surgery with repair of deviated septum       TONSILLECTOMY, ADENOIDECTOMY ADULT, COMBINED Bilateral  5/12/2022    Procedure: BILATERAL TONSILLECTOMY;  Surgeon: Dane Lowery MD;  Location: [facility]     TUBAL LIGATION        Allergies   Allergen Reactions     Ivp Dye [Contrast Dye] Anaphylaxis and Hives     Cats Anaphylaxis     Niaspan [Niacin] Rash     Pollen Extract/Tree Extract       Social History     Tobacco Use     Smoking status: Never Smoker     Smokeless tobacco: Never Used   Substance Use Topics     Alcohol use: Yes     Alcohol/week: 0.0 standard drinks     Comment: rare       Wt Readings from Last 1 Encounters:   06/27/22 94.8 kg (209 lb)        Anesthesia Evaluation   Pt has had prior anesthetic.         ROS/MED HX  ENT/Pulmonary:     (+) allergic rhinitis,     Neurologic:     (+) migraines,     Cardiovascular:     (+) Dyslipidemia hypertension-----Previous cardiac testing   Echo: Date: Results:    Stress Test: Date: Results:    ECG Reviewed: Date: 5/2022 Results:  Sinus Bradycardia   - Diffuse nonspecific T-abnormality.     ABNORMAL     Cath: Date: Results:      METS/Exercise Tolerance:     Hematologic:       Musculoskeletal:       GI/Hepatic:       Renal/Genitourinary:     (+) Nephrolithiasis ,     Endo:       Psychiatric/Substance Use:     (+) psychiatric history anxiety     Infectious Disease:       Malignancy:       Other:            Physical Exam    Airway        Mallampati: I   TM distance: > 3 FB   Neck ROM: full   Mouth opening: > 3 cm    Respiratory Devices and Support         Dental  no notable dental history         Cardiovascular   cardiovascular exam normal          Pulmonary   pulmonary exam normal                OUTSIDE LABS:  CBC:   Lab Results   Component Value Date    WBC 5.6 05/03/2022    WBC 5.3 08/06/2019    HGB 13.9 05/03/2022    HGB 13.7 08/06/2019    HCT 41.6 05/03/2022    HCT 40.1 08/06/2019     05/03/2022     08/06/2019     BMP:   Lab Results   Component Value Date     05/03/2022     10/05/2021    POTASSIUM 3.7 05/03/2022    POTASSIUM 4.2  10/05/2021    CHLORIDE 102 05/03/2022    CHLORIDE 104 10/05/2021    CO2 27 05/03/2022    CO2 26 10/05/2021    BUN 19 05/03/2022    BUN 19 10/05/2021    CR 0.73 05/03/2022    CR 0.83 10/05/2021     (H) 05/03/2022     (H) 10/05/2021     COAGS:   Lab Results   Component Value Date    INR 1.00 05/03/2022     POC: No results found for: BGM, HCG, HCGS  HEPATIC:   Lab Results   Component Value Date    ALBUMIN 4.1 09/27/2018    PROTTOTAL 7.7 09/27/2018    ALT 49 05/03/2022    AST 39 09/27/2018    ALKPHOS 66 09/27/2018    BILITOTAL 0.3 09/27/2018     OTHER:   Lab Results   Component Value Date    PH 5 08/10/2001    A1C 5.6 06/19/2013    BIA 9.7 05/03/2022    PHOS 3.3 08/31/2016    MAG 1.8 07/20/2016    LIPASE 153 11/03/2012    TSH 2.92 05/04/2015    T4 0.81 07/19/2007    CRP 23.9 (H) 05/13/2011    SED 29 (H) 06/08/2011       Anesthesia Plan    ASA Status:  3      Anesthesia Type: General.              Consents    Anesthesia Plan(s) and associated risks, benefits, and realistic alternatives discussed. Questions answered and patient/representative(s) expressed understanding.     - Discussed: Risks, Benefits and Alternatives for BOTH SEDATION and the PROCEDURE were discussed     - Discussed with:  Patient         Postoperative Care       PONV prophylaxis: Background Propofol Infusion     Comments:                Selvin Ma CRNA, APRN CRNA

## 2022-08-06 NOTE — PROGRESS NOTES
Procedure cancelled d/t physician cancellation. Left message on voicemail regarding cancellation,  to call on Monday.

## 2022-08-08 ENCOUNTER — ANESTHESIA (OUTPATIENT)
Dept: GASTROENTEROLOGY | Facility: CLINIC | Age: 62
End: 2022-08-08
Payer: COMMERCIAL

## 2022-08-23 DIAGNOSIS — F06.4 ANXIETY DISORDER DUE TO MEDICAL CONDITION: ICD-10-CM

## 2022-08-23 DIAGNOSIS — F41.9 ANXIETY: ICD-10-CM

## 2022-08-23 RX ORDER — DIAZEPAM 5 MG
TABLET ORAL
Qty: 30 TABLET | Refills: 0 | Status: SHIPPED | OUTPATIENT
Start: 2022-08-23 | End: 2022-12-31

## 2022-08-30 DIAGNOSIS — R09.82 PND (POST-NASAL DRIP): ICD-10-CM

## 2022-08-30 RX ORDER — FLUTICASONE PROPIONATE 50 MCG
SPRAY, SUSPENSION (ML) NASAL
Qty: 16 G | Refills: 3 | Status: SHIPPED | OUTPATIENT
Start: 2022-08-30 | End: 2024-04-24

## 2022-09-21 ENCOUNTER — HOSPITAL ENCOUNTER (OUTPATIENT)
Facility: CLINIC | Age: 62
Discharge: HOME | End: 2022-09-21
Attending: SURGERY | Admitting: SURGERY
Payer: COMMERCIAL

## 2022-09-21 VITALS
BODY MASS INDEX: 37.03 KG/M2 | HEART RATE: 57 BPM | RESPIRATION RATE: 15 BRPM | WEIGHT: 209 LBS | OXYGEN SATURATION: 100 % | HEIGHT: 63 IN | SYSTOLIC BLOOD PRESSURE: 118 MMHG | DIASTOLIC BLOOD PRESSURE: 78 MMHG | TEMPERATURE: 98.2 F

## 2022-09-21 LAB — COLONOSCOPY: NORMAL

## 2022-09-21 PROCEDURE — 250N000009 HC RX 250: Performed by: SURGERY

## 2022-09-21 PROCEDURE — G0121 COLON CA SCRN NOT HI RSK IND: HCPCS | Performed by: SURGERY

## 2022-09-21 PROCEDURE — 370N000017 HC ANESTHESIA TECHNICAL FEE, PER MIN: Performed by: SURGERY

## 2022-09-21 PROCEDURE — 258N000003 HC RX IP 258 OP 636: Performed by: SURGERY

## 2022-09-21 PROCEDURE — 45378 DIAGNOSTIC COLONOSCOPY: CPT | Performed by: SURGERY

## 2022-09-21 PROCEDURE — 250N000011 HC RX IP 250 OP 636: Performed by: NURSE ANESTHETIST, CERTIFIED REGISTERED

## 2022-09-21 RX ORDER — NALOXONE HYDROCHLORIDE 0.4 MG/ML
0.4 INJECTION, SOLUTION INTRAMUSCULAR; INTRAVENOUS; SUBCUTANEOUS
Status: CANCELLED | OUTPATIENT
Start: 2022-09-21

## 2022-09-21 RX ORDER — LIDOCAINE 40 MG/G
CREAM TOPICAL
Status: DISCONTINUED | OUTPATIENT
Start: 2022-09-21 | End: 2022-09-21 | Stop reason: HOSPADM

## 2022-09-21 RX ORDER — ONDANSETRON 4 MG/1
4 TABLET, ORALLY DISINTEGRATING ORAL EVERY 30 MIN PRN
Status: CANCELLED | OUTPATIENT
Start: 2022-09-21

## 2022-09-21 RX ORDER — SODIUM CHLORIDE, SODIUM LACTATE, POTASSIUM CHLORIDE, CALCIUM CHLORIDE 600; 310; 30; 20 MG/100ML; MG/100ML; MG/100ML; MG/100ML
INJECTION, SOLUTION INTRAVENOUS CONTINUOUS
Status: CANCELLED | OUTPATIENT
Start: 2022-09-21

## 2022-09-21 RX ORDER — SODIUM CHLORIDE, SODIUM LACTATE, POTASSIUM CHLORIDE, CALCIUM CHLORIDE 600; 310; 30; 20 MG/100ML; MG/100ML; MG/100ML; MG/100ML
INJECTION, SOLUTION INTRAVENOUS CONTINUOUS
Status: DISCONTINUED | OUTPATIENT
Start: 2022-09-21 | End: 2022-09-21 | Stop reason: HOSPADM

## 2022-09-21 RX ORDER — NALOXONE HYDROCHLORIDE 0.4 MG/ML
0.2 INJECTION, SOLUTION INTRAMUSCULAR; INTRAVENOUS; SUBCUTANEOUS
Status: CANCELLED | OUTPATIENT
Start: 2022-09-21

## 2022-09-21 RX ORDER — ONDANSETRON 2 MG/ML
4 INJECTION INTRAMUSCULAR; INTRAVENOUS
Status: DISCONTINUED | OUTPATIENT
Start: 2022-09-21 | End: 2022-09-21 | Stop reason: HOSPADM

## 2022-09-21 RX ORDER — FLUMAZENIL 0.1 MG/ML
0.2 INJECTION, SOLUTION INTRAVENOUS
Status: CANCELLED | OUTPATIENT
Start: 2022-09-21 | End: 2022-09-21

## 2022-09-21 RX ORDER — ONDANSETRON 2 MG/ML
4 INJECTION INTRAMUSCULAR; INTRAVENOUS EVERY 30 MIN PRN
Status: CANCELLED | OUTPATIENT
Start: 2022-09-21

## 2022-09-21 RX ORDER — PROPOFOL 10 MG/ML
INJECTION, EMULSION INTRAVENOUS PRN
Status: DISCONTINUED | OUTPATIENT
Start: 2022-09-21 | End: 2022-09-21

## 2022-09-21 RX ADMIN — PROPOFOL 150 MG: 10 INJECTION, EMULSION INTRAVENOUS at 08:23

## 2022-09-21 RX ADMIN — SODIUM CHLORIDE, POTASSIUM CHLORIDE, SODIUM LACTATE AND CALCIUM CHLORIDE: 600; 310; 30; 20 INJECTION, SOLUTION INTRAVENOUS at 08:00

## 2022-09-21 RX ADMIN — PROPOFOL 150 MG: 10 INJECTION, EMULSION INTRAVENOUS at 08:27

## 2022-09-21 RX ADMIN — LIDOCAINE HYDROCHLORIDE 0.1 ML: 10 INJECTION, SOLUTION EPIDURAL; INFILTRATION; INTRACAUDAL; PERINEURAL at 08:00

## 2022-09-21 RX ADMIN — PROPOFOL 150 MG: 10 INJECTION, EMULSION INTRAVENOUS at 08:25

## 2022-09-21 ASSESSMENT — ACTIVITIES OF DAILY LIVING (ADL): ADLS_ACUITY_SCORE: 35

## 2022-09-21 NOTE — H&P
62 year old year old female here for colonoscopy for screening.  Last colonoscopy was 2012.  Patient denies blood in stool or change in stool caliber.  History of colon cancer in a maternal aunt.    Patient Active Problem List   Diagnosis     Anxiety disorder due to medical condition     Essential hypertension, benign     Sensorineural hearing loss     Peripheral vertigo     Allergic rhinitis     Symptomatic menopausal or female climacteric states     Psoriasis     Hyperlipidemia LDL goal <130     Kidney stones     Hypertriglyceridemia     Meniere disease     Anxiety     Obesity (BMI 35.0-39.9) with comorbidity (H)     Primary insomnia     Chronic pansinusitis     Chronic tonsillitis       Past Medical History:   Diagnosis Date     Abnormal Papanicolaou smear of cervix and cervical HPV 1986     Anxiety      Anxiety disorder in conditions classified elsewhere      Calculus of kidney 08/11/1988    Calcium renal stone, oxylate     Calculus of ureter 06/30/1985    LEFT urteral stone     Excessive or frequent menstruation 07/07/2003     Labyrinthitis, unspecified      Lump or mass in breast 10/27/1999    RIGHT breast mass     Variants of migraine, not elsewhere classified, without mention of intractable migraine without mention of status migrainosus 02/07/2002       Past Surgical History:   Procedure Laterality Date     CARPAL TUNNEL RELEASE RT/LT  08/2008    Left      COLONOSCOPY  8/7/2012    Procedure: COLONOSCOPY;  Colonoscopy;  Surgeon: Kailyn Lopez MD;  Location: [facility]     CYSTOSCOPY, RETROGRADES, INSERT STENT URETER(S), COMBINED Left 8/19/2016    Procedure: COMBINED CYSTOSCOPY, RETROGRADES, INSERT STENT URETER(S);  Surgeon: Terence Carpenter MD;  Location: [facility]     esteem implant  06/23/2011    Hearing implant right ear.     HYSTERECTOMY, PAP NO LONGER INDICATED  12/2004    Vaginal hysterectomy     LAPAROSCOPIC ABLATION ENDOMETRIOSIS  07/2004     LASER HOLMIUM LITHOTRIPSY URETER(S), INSERT  "STENT, COMBINED Left 9/12/2016    Procedure: COMBINED CYSTOSCOPY, URETEROSCOPY, LASER HOLMIUM LITHOTRIPSY URETER(S), INSERT STENT;  Surgeon: Kailyn Dozier MD;  Location: [facility]     LEFT wrist surgery       OPTICAL TRACKING SYSTEM ENDOSCOPIC SINUS SURGERY Bilateral 5/12/2022    Procedure: IMAGE GUIDED FUNCTIONAL BILATERAL  ENDOSCOPIC SINUS SURGERY (FESS) WITHOUT SEPTOPLASTY;  Surgeon: Dane Lowery MD;  Location: [facility]     Sinus surgery with repair of deviated septum       TONSILLECTOMY, ADENOIDECTOMY ADULT, COMBINED Bilateral 5/12/2022    Procedure: BILATERAL TONSILLECTOMY;  Surgeon: Dane Lowery MD;  Location: [facility]     TUBAL LIGATION         Family History   Problem Relation Age of Onset     Heart Disease Brother      Hypertension Brother      Asthma Daughter      Heart Disease Brother      Hypertension Brother      C.A.D. Father         MI      Hypertension Father      Lipids Father 50     C.A.D. Paternal Aunt         MI-50s     Cancer Maternal Aunt         Ovarian CA     Cerebrovascular Disease No family hx of      Breast Cancer No family hx of      Cancer - colorectal No family hx of      Prostate Cancer No family hx of        No current outpatient medications on file.       Allergies   Allergen Reactions     Ivp Dye [Contrast Dye] Anaphylaxis and Hives     Cats Anaphylaxis     Niaspan [Niacin] Rash     Pollen Extract/Tree Extract        Pt reports that she has never smoked. She has never used smokeless tobacco. She reports current alcohol use. She reports that she does not use drugs.    Exam:  BP (!) 152/80 (BP Location: Right arm)   Pulse 66   Temp 98.2  F (36.8  C) (Oral)   Resp 16   Ht 1.6 m (5' 3\")   Wt 94.8 kg (209 lb)   SpO2 93%   BMI 37.02 kg/m      Awake, Alert OX3  Lungs - CTA bilaterally  CV - RRR, no murmurs, distal pulses intact  Abd - soft, non-distended, non-tender, +BS  Extr - No cyanosis or edema    A/P 62 year old year old female in need of colonoscopy for " screening. Risks, benefits, alternatives, and complications were discussed including the possibility of perforation, bleeding, missed lesion and the patient agreed to proceed    Denton Montilla DO on 9/21/2022 at 7:54 AM

## 2022-09-21 NOTE — ANESTHESIA POSTPROCEDURE EVALUATION
Patient: Kenya Rubalcava    Procedure: Procedure(s):  COLONOSCOPY       Anesthesia Type:  General    Note:  Disposition: Outpatient   Postop Pain Control: Uneventful            Sign Out: Well controlled pain   PONV: No   Neuro/Psych: Uneventful            Sign Out: Acceptable/Baseline neuro status   Airway/Respiratory: Uneventful            Sign Out: Acceptable/Baseline resp. status   CV/Hemodynamics: Uneventful            Sign Out: Acceptable CV status; No obvious hypovolemia; No obvious fluid overload   Other NRE: NONE   DID A NON-ROUTINE EVENT OCCUR? No           Last vitals:  Vitals Value Taken Time   /79 09/21/22 0845   Temp     Pulse 55 09/21/22 0845   Resp 15 09/21/22 0845   SpO2 93 % 09/21/22 0847   Vitals shown include unvalidated device data.    Electronically Signed By: SUSY Dickerson CRNA  September 21, 2022  8:47 AM

## 2022-09-21 NOTE — ANESTHESIA CARE TRANSFER NOTE
Patient: Kenya Rubalcava    Procedure: Procedure(s):  COLONOSCOPY       Diagnosis: Screening for colon cancer [Z12.11]  Diagnosis Additional Information: No value filed.    Anesthesia Type:   General     Note:    Oropharynx: oropharynx clear of all foreign objects and spontaneously breathing  Level of Consciousness: awake  Oxygen Supplementation: room air    Independent Airway: airway patency satisfactory and stable  Dentition: dentition unchanged  Vital Signs Stable: post-procedure vital signs reviewed and stable  Report to RN Given: handoff report given  Patient transferred to: Phase II    Handoff Report: Identifed the Patient, Identified the Reponsible Provider, Reviewed the pertinent medical history, Discussed the surgical course, Reviewed Intra-OP anesthesia mangement and issues during anesthesia, Set expectations for post-procedure period and Allowed opportunity for questions and acknowledgement of understanding      Vitals:  Vitals Value Taken Time   BP     Temp     Pulse     Resp     SpO2         Electronically Signed By: SUSY Dickerson CRNA  September 21, 2022  8:40 AM

## 2022-09-26 DIAGNOSIS — F51.01 PRIMARY INSOMNIA: ICD-10-CM

## 2022-09-26 RX ORDER — TRAZODONE HYDROCHLORIDE 100 MG/1
TABLET ORAL
Qty: 180 TABLET | Refills: 0 | Status: SHIPPED | OUTPATIENT
Start: 2022-09-26 | End: 2022-12-18

## 2022-10-13 ENCOUNTER — APPOINTMENT (OUTPATIENT)
Dept: FAMILY MEDICINE | Facility: CLINIC | Age: 62
End: 2022-10-13
Payer: COMMERCIAL

## 2022-10-13 ENCOUNTER — TELEPHONE (OUTPATIENT)
Dept: FAMILY MEDICINE | Facility: CLINIC | Age: 62
End: 2022-10-13

## 2022-10-13 ENCOUNTER — E-VISIT (OUTPATIENT)
Dept: FAMILY MEDICINE | Facility: CLINIC | Age: 62
End: 2022-10-13

## 2022-10-13 DIAGNOSIS — J02.9 ACUTE PHARYNGITIS, UNSPECIFIED ETIOLOGY: Primary | ICD-10-CM

## 2022-10-13 LAB
DEPRECATED S PYO AG THROAT QL EIA: NEGATIVE
GROUP A STREP BY PCR: NOT DETECTED

## 2022-10-13 PROCEDURE — 99421 OL DIG E/M SVC 5-10 MIN: CPT | Performed by: FAMILY MEDICINE

## 2022-10-13 PROCEDURE — 87651 STREP A DNA AMP PROBE: CPT | Performed by: FAMILY MEDICINE

## 2022-10-13 RX ORDER — PREDNISONE 20 MG/1
40 TABLET ORAL DAILY
Qty: 4 TABLET | Refills: 1 | Status: SHIPPED | OUTPATIENT
Start: 2022-10-13 | End: 2022-10-15

## 2022-10-13 NOTE — TELEPHONE ENCOUNTER
"Received call from Patient  Patient states that she has a \"deep, juicy cough', raw throat, congested, body aches, headache, ears plugged and fatigue.  Started last Thursday and got really bad on Friday  States that she is coughing up thick yellow sputum  Nasal congestion also thick yellow drainage  Covid tested yesterday, Covid test negative  Patient temp 98  Taking tylenol and ibuprofen, alternating between the 2, with little relief  Also taking jason seltzer plus, with little relief.  Denies breathing problems, is mouth breathing  Denies chest pain  Eating soft foods, as it hurts to swallow  Drinking fluids   No stomach upset  No change in stools  Recommended mychart evisit and to have strep swab done as lab only appointment.  Dr Real aware.  All questions answered  Patient verbalizes understanding  Riley Harrison RN     "

## 2022-10-18 ENCOUNTER — VIRTUAL VISIT (OUTPATIENT)
Dept: FAMILY MEDICINE | Facility: CLINIC | Age: 62
End: 2022-10-18
Payer: COMMERCIAL

## 2022-10-18 VITALS — BODY MASS INDEX: 38.09 KG/M2 | HEIGHT: 63 IN | WEIGHT: 215 LBS

## 2022-10-18 DIAGNOSIS — E78.1 HYPERTRIGLYCERIDEMIA: ICD-10-CM

## 2022-10-18 DIAGNOSIS — I10 ESSENTIAL HYPERTENSION, BENIGN: ICD-10-CM

## 2022-10-18 DIAGNOSIS — Z13.220 SCREENING FOR HYPERLIPIDEMIA: ICD-10-CM

## 2022-10-18 DIAGNOSIS — U07.1 INFECTION DUE TO 2019 NOVEL CORONAVIRUS: Primary | ICD-10-CM

## 2022-10-18 PROCEDURE — 99213 OFFICE O/P EST LOW 20 MIN: CPT | Mod: CS | Performed by: NURSE PRACTITIONER

## 2022-10-18 RX ORDER — GUAIFENESIN AND DEXTROMETHORPHAN HYDROBROMIDE 600; 30 MG/1; MG/1
1 TABLET, EXTENDED RELEASE ORAL EVERY 12 HOURS
Qty: 20 TABLET | Refills: 0 | Status: SHIPPED | OUTPATIENT
Start: 2022-10-18 | End: 2023-01-01

## 2022-10-18 RX ORDER — HYDROXYZINE PAMOATE 50 MG/1
50 CAPSULE ORAL 3 TIMES DAILY PRN
Qty: 14 CAPSULE | Refills: 0 | Status: SHIPPED | OUTPATIENT
Start: 2022-10-18

## 2022-10-18 RX ORDER — BENZONATATE 200 MG/1
200 CAPSULE ORAL 3 TIMES DAILY PRN
Qty: 30 CAPSULE | Refills: 0 | Status: SHIPPED | OUTPATIENT
Start: 2022-10-18 | End: 2023-02-07

## 2022-10-18 ASSESSMENT — ENCOUNTER SYMPTOMS
RHINORRHEA: 1
FATIGUE: 1
COUGH: 1
SORE THROAT: 1

## 2022-10-18 NOTE — PROGRESS NOTES
"Marlyn is a 62 year old who is being evaluated via a billable telephone visit.      What phone number would you like to be contacted at? [phone]  How would you like to obtain your AVS? MyChart    Assessment & Plan       Infection due to 2019 novel coronavirus  Discussed drug interactions with her she is going to hold the medications that have interactions for the next 10 days. No renal dysfunction.   - nirmatrelvir and ritonavir (PAXLOVID) therapy pack; Take 3 tablets by mouth 2 times daily for 5 days (Take 2 Nirmatrelvir tablets and 1 Ritonavir tablet twice daily for 5 days)  - benzonatate (TESSALON) 200 MG capsule; Take 1 capsule (200 mg) by mouth 3 times daily as needed  - dextromethorphan-guaiFENesin (MUCINEX DM)  MG 12 hr tablet; Take 1 tablet by mouth every 12 hours  - hydrOXYzine (VISTARIL) 50 MG capsule; Take 1 capsule (50 mg) by mouth 3 times daily as needed for itching  Patient is to hold budesonide, betamethasone, trazodone, diazepam,pravastatin, fluticasone while taking Pepcid instead of trazodone she is willing to take hydroxyzine instead.       BMI:   Estimated body mass index is 38.09 kg/m  as calculated from the following:    Height as of this encounter: 1.6 m (5' 3\").    Weight as of this encounter: 97.5 kg (215 lb).       Return in about 1 week (around 10/25/2022), or if symptoms worsen or fail to improve, for Follow up.    SUSY Dye CNP  [facility]    Subjective   Marlyn is a 62 year old, presenting for the following health issues:  Covid Concern      HPI       COVID-19 Symptom Review  How many days ago did these symptoms start? Started a week ago with cold symptoms due to Grandkids.  Tested positive for COVID yesterday.    Are any of the following symptoms significant for you?  New or worsening difficulty breathing? Yes    Please describe what kind of difficulty you are having breathing:Moderate dyspnea (short of breath with ADLs, shortness of breath " that limits the ability to walk up one flight of stairs without needing to rest)    Worsening cough? Yes, I am coughing up mucus.in the beginning, now dry cough    Fever or chills? Yes, I felt feverish or had chills. - skin feels like skin is burning    Headache: YES    Sore throat: YES- somewhat    Chest pain: YES also between shoulder blades    Diarrhea: YES    Body aches? YES    What treatments has patient tried? Acetaminophen and Cough syrup  - prednisone for two days for sore throat- completed dose Saturday  Does patient live in a nursing home, group home, or shelter? No  Does patient have a way to get food/medications during quarantined? Yes, I have a friend or family member who can help me.        Started symptoms on October 10th with congestion, ear pain, rhinorrhea. Negative COVID for October 13th, positive for COVID on Oct 17th. Coughing, fatigue, pain in back in chest when she coughs. No fevers. Continues to have runny nose and congestion. History of high blood pressure, high triglycerides.     Review of Systems   Constitutional: Positive for fatigue.   HENT: Positive for congestion, rhinorrhea and sore throat.    Respiratory: Positive for cough.             Objective           Vitals:  No vitals were obtained today due to virtual visit.    Physical Exam   healthy, alert and no distress  PSYCH: Alert and oriented times 3; coherent speech, normal   rate and volume, able to articulate logical thoughts, able   to abstract reason, no tangential thoughts, no hallucinations   or delusions  Her affect is normal  RESP: No cough, no audible wheezing, able to talk in full sentences  Remainder of exam unable to be completed due to telephone visits            Phone call duration: 20 minutes

## 2022-10-18 NOTE — PATIENT INSTRUCTIONS
Thank you for choosing us for your care. I have placed an order for a prescription so that you can start treatment. View your full visit summary for details by clicking on the link below. Your pharmacist will able to address any questions you may have about the medication.     If you're not feeling better within 5-7 days, please schedule an appointment.  You can schedule an appointment right here in [facility], or call [phone]  If the visit is for the same symptoms as your eVisit, we'll refund the cost of your eVisit if seen within seven days.

## 2022-10-18 NOTE — PATIENT INSTRUCTIONS
COVID-19 Outpatient Treatments  Your care team can help you find the best treatments for COVID-19. Talk to a health care provider or refer to the FDA medicine fact sheets below.    Important: You CAN'T have molnupiravir or Paxlovid if you are starting the medicine more than 5 days after your symptoms have started.  Paxlovid: https://www.fda.gov/media/071476/download  Molnupiravir: https://www.fda.gov/media/302832/download  Monoclonal antibodies: https://combatcovid.hhs.gov/what-are-monoclonal-antibodies  Paxlovid (nimatrelvir and ritonavir)  How it works  Two medicines (nirmatrelvir and ritonavir) are taken together. They stop the virus from growing. Less amount of virus is easier for your body to fight.  Benefits  Lowers risk of a hospital stay or death from COVID-19.  How to take    Medicine comes in a daily container with both medicine tablets. Take by mouth twice daily (once in the morning, once at night) for 5 days.    The number of tablets to take varies by patient.    Don't chew or break capsules. Swallow whole.  When to take  Take as soon as possible after positive COVID-19 test result, and within 5 days of your first symptoms.  Who can take it  Patients must be 12 years or older, weigh at least 88 pounds, and have tested positive for COVID-19. This is the preferred treatment for pregnant patients.  Possible side effects  Can cause altered sense of taste, diarrhea (loose, watery stools), high blood pressure, muscle aches.  Medicine conflicts    Some medicines may conflict with Paxlovid and may cause serious side effects.    Tell your care team about all the medicines you take, including prescription and over-the-counter medicines, vitamins and herbal supplements.    Your provider will review your medicines to make sure that you can safely take Paxlovid.  Cautions    Paxlovid is not advised for patients with severe kidney or liver disease. If you have kidney or liver problems, the dose may need to be  changed.    If you are pregnant or breastfeeding, talk to your care team about your options.    If you are sexually active, use trusted birth control while taking Paxlovid.  Molnupiravir  How it works  Stops the virus from growing. Less amount of virus is easier for your body to fight.  Benefits  Lowers risk of a hospital stay or death from COVID-19.  How to take  Take 4 capsules by mouth every 12 hours (4 in the morning and 4 at night) for 5 days. Don't chew or break capsules. Swallow whole.  When to take  Take as soon as possible after positive COVID-19 test result, and within 5 days of your first symptoms.  Who can take it  Patients must be 18 years or older and have tested positive for COVID-19.  Possible side effects  Diarrhea (loose, watery stools), nausea (feeling sick to your stomach), dizziness, headaches.  Medicine conflicts  Right now, there are no known conflicts with other drugs. But tell your care team all medicines you take.  Cautions    This is not advised for patients who are pregnant.    Patients who could become pregnant should use trusted birth control until 4 days after their last dose.    Sexually active people of any gender should use trusted birth control for 3 months after their last dose.  Monoclonal antibodies  How it works  Monoclonal antibodies can detect pieces of the COVID virus and stop it from infecting your cells.  Benefits  Lowers risk of a hospital stay or death from COVID-19. Monoclonal antibodies are known to work well against the omicron variant.  How it is given to you  You will receive the treatment either by an infusion through your vein (IV) or shots.  When to take  Get as soon as possible after you test positive for COVID-19, and within 7 days of your first symptoms.  Who can take it  Patients must be 12 years or older, weigh at least 88 pounds and have tested positive for COVID-19.  Possible side effects  Fever, chills, diarrhea (loose, watery stools), dizziness,  itchiness and rash.  More serious side effects include: fever, difficulty breathing, low oxygen level in your blood, chills, tiredness, fast or slow heart rate, chest discomfort or pain, weakness, confusion, nausea, headache, shortness of breath, low or high blood pressure, wheezing, swelling of your lips, face, or throat, rash including hives, itching, muscle aches, dizziness, feeling faint and sweating.  If you receive an IV, you may have brief pain, bleeding and bruising of the skin, soreness, swelling and possible infection at the place where you get the IV needle.  Medicine conflicts  Please tell you care team other medicines you take so they can assess if there are any conflicts.  Cautions  Your doctor will talk with you about risks and benefits of this treatment and will help choose the best option for you.  For informational purposes only. Not to replace the advice of your health care provider.  Copyright   2022 [facility]. All rights reserved. Clinically reviewed by Ofe Osborne. [employer] [id number] - 08/22.

## 2022-10-26 NOTE — TELEPHONE ENCOUNTER
DiazePAM Oral Tablet 5 MG        Last Written Prescription Date:  12/14/17  Last Fill Quantity: 60,   # refills: 3  Last Office Visit: 12/14/17  Future Office visit:       Routing refill request to provider for review/approval because:  Drug not on the [facility], [facility] or [facility] refill protocol or controlled substance    
[place] notified and script faxed to [facility]..Evette Ricks    
[phone]

## 2022-11-07 ENCOUNTER — IMMUNIZATION (OUTPATIENT)
Dept: FAMILY MEDICINE | Facility: CLINIC | Age: 62
End: 2022-11-07
Payer: COMMERCIAL

## 2022-11-07 PROCEDURE — 91312 COVID-19,PF,PFIZER BOOSTER BIVALENT: CPT

## 2022-11-07 PROCEDURE — 90471 IMMUNIZATION ADMIN: CPT

## 2022-11-07 PROCEDURE — 0124A COVID-19,PF,PFIZER BOOSTER BIVALENT: CPT

## 2022-11-07 PROCEDURE — 90682 RIV4 VACC RECOMBINANT DNA IM: CPT

## 2022-11-20 ENCOUNTER — HEALTH MAINTENANCE LETTER (OUTPATIENT)
Age: 62
End: 2022-11-20

## 2022-11-30 DIAGNOSIS — E78.5 HYPERLIPIDEMIA LDL GOAL <130: ICD-10-CM

## 2022-12-02 NOTE — TELEPHONE ENCOUNTER
Routing refill request to provider for review/approval because:  Labs not current:  LDL 10/5/21.  Kary Costello RN

## 2022-12-04 RX ORDER — PRAVASTATIN SODIUM 40 MG
TABLET ORAL
Qty: 90 TABLET | Refills: 0 | Status: SHIPPED | OUTPATIENT
Start: 2022-12-04 | End: 2022-12-29

## 2022-12-12 DIAGNOSIS — I10 ESSENTIAL HYPERTENSION, BENIGN: ICD-10-CM

## 2022-12-12 RX ORDER — ATENOLOL 50 MG/1
TABLET ORAL
Qty: 90 TABLET | Refills: 0 | Status: SHIPPED | OUTPATIENT
Start: 2022-12-12 | End: 2022-12-29

## 2022-12-16 DIAGNOSIS — F51.01 PRIMARY INSOMNIA: ICD-10-CM

## 2022-12-16 DIAGNOSIS — F41.9 ANXIETY: ICD-10-CM

## 2022-12-16 NOTE — TELEPHONE ENCOUNTER
Routing refill request to provider for review/approval because:  Patient needs to be seen because it has been more than 1 year since last office visit.  Riley Harrison RN

## 2022-12-18 RX ORDER — VENLAFAXINE HYDROCHLORIDE 150 MG/1
CAPSULE, EXTENDED RELEASE ORAL
Qty: 90 CAPSULE | Refills: 0 | Status: SHIPPED | OUTPATIENT
Start: 2022-12-18 | End: 2022-12-29

## 2022-12-18 RX ORDER — TRAZODONE HYDROCHLORIDE 100 MG/1
TABLET ORAL
Qty: 180 TABLET | Refills: 0 | Status: SHIPPED | OUTPATIENT
Start: 2022-12-18 | End: 2022-12-29

## 2022-12-31 ENCOUNTER — MYC REFILL (OUTPATIENT)
Dept: FAMILY MEDICINE | Facility: CLINIC | Age: 62
End: 2022-12-31

## 2022-12-31 DIAGNOSIS — F06.4 ANXIETY DISORDER DUE TO MEDICAL CONDITION: ICD-10-CM

## 2022-12-31 DIAGNOSIS — F41.9 ANXIETY: ICD-10-CM

## 2023-01-01 RX ORDER — DIAZEPAM 5 MG
TABLET ORAL
Qty: 30 TABLET | Refills: 0 | Status: SHIPPED | OUTPATIENT
Start: 2023-01-01 | End: 2023-02-07

## 2023-01-28 DIAGNOSIS — I10 ESSENTIAL HYPERTENSION, BENIGN: ICD-10-CM

## 2023-01-30 NOTE — TELEPHONE ENCOUNTER
Routing refill request to provider for review/approval because:  A break in medication    Palomo Martinez RN

## 2023-01-30 NOTE — TELEPHONE ENCOUNTER
Pt has been without med for 3 days, hoping to get filled today if possible.    Thank you,    Didi Franco, TOMI Anthony

## 2023-01-31 RX ORDER — TRIAMTERENE AND HYDROCHLOROTHIAZIDE 37.5; 25 MG/1; MG/1
CAPSULE ORAL
Qty: 90 CAPSULE | Refills: 0 | Status: SHIPPED | OUTPATIENT
Start: 2023-01-31 | End: 2023-02-07

## 2023-02-01 ASSESSMENT — ENCOUNTER SYMPTOMS
BREAST MASS: 0
SORE THROAT: 0
DYSURIA: 0
MYALGIAS: 1
NAUSEA: 0
SHORTNESS OF BREATH: 0
EYE PAIN: 0
CHILLS: 0
COUGH: 0
HEARTBURN: 1
FREQUENCY: 0
PARESTHESIAS: 0
HEMATURIA: 0
PALPITATIONS: 0
ARTHRALGIAS: 1
JOINT SWELLING: 0
NERVOUS/ANXIOUS: 1
HEADACHES: 1
HEMATOCHEZIA: 0
CONSTIPATION: 0
ABDOMINAL PAIN: 0
DIZZINESS: 1
DIARRHEA: 0
WEAKNESS: 0
FEVER: 0

## 2023-02-07 ENCOUNTER — OFFICE VISIT (OUTPATIENT)
Dept: FAMILY MEDICINE | Facility: CLINIC | Age: 63
End: 2023-02-07
Payer: COMMERCIAL

## 2023-02-07 VITALS
TEMPERATURE: 98.3 F | SYSTOLIC BLOOD PRESSURE: 134 MMHG | HEIGHT: 63 IN | WEIGHT: 208 LBS | OXYGEN SATURATION: 98 % | BODY MASS INDEX: 36.86 KG/M2 | DIASTOLIC BLOOD PRESSURE: 86 MMHG | HEART RATE: 63 BPM

## 2023-02-07 DIAGNOSIS — I10 ESSENTIAL HYPERTENSION, BENIGN: ICD-10-CM

## 2023-02-07 DIAGNOSIS — K86.2 PANCREATIC CYST: ICD-10-CM

## 2023-02-07 DIAGNOSIS — F51.01 PRIMARY INSOMNIA: ICD-10-CM

## 2023-02-07 DIAGNOSIS — F06.4 ANXIETY DISORDER DUE TO MEDICAL CONDITION: ICD-10-CM

## 2023-02-07 DIAGNOSIS — F41.9 ANXIETY: ICD-10-CM

## 2023-02-07 DIAGNOSIS — M70.61 GREATER TROCHANTERIC BURSITIS OF BOTH HIPS: ICD-10-CM

## 2023-02-07 DIAGNOSIS — E78.5 HYPERLIPIDEMIA LDL GOAL <130: ICD-10-CM

## 2023-02-07 DIAGNOSIS — E78.1 HYPERTRIGLYCERIDEMIA: ICD-10-CM

## 2023-02-07 DIAGNOSIS — M70.62 GREATER TROCHANTERIC BURSITIS OF BOTH HIPS: ICD-10-CM

## 2023-02-07 DIAGNOSIS — Z00.00 ROUTINE GENERAL MEDICAL EXAMINATION AT A HEALTH CARE FACILITY: Primary | ICD-10-CM

## 2023-02-07 LAB
ANION GAP SERPL CALCULATED.3IONS-SCNC: 12 MMOL/L (ref 7–15)
BUN SERPL-MCNC: 14.1 MG/DL (ref 8–23)
CALCIUM SERPL-MCNC: 10.7 MG/DL (ref 8.8–10.2)
CHLORIDE SERPL-SCNC: 98 MMOL/L (ref 98–107)
CHOLEST SERPL-MCNC: 171 MG/DL
CREAT SERPL-MCNC: 0.84 MG/DL (ref 0.51–0.95)
DEPRECATED HCO3 PLAS-SCNC: 28 MMOL/L (ref 22–29)
GFR SERPL CREATININE-BSD FRML MDRD: 79 ML/MIN/1.73M2
GLUCOSE SERPL-MCNC: 87 MG/DL (ref 70–99)
HDLC SERPL-MCNC: 40 MG/DL
LDLC SERPL CALC-MCNC: 84 MG/DL
NONHDLC SERPL-MCNC: 131 MG/DL
POTASSIUM SERPL-SCNC: 4.5 MMOL/L (ref 3.4–5.3)
SODIUM SERPL-SCNC: 138 MMOL/L (ref 136–145)
TRIGL SERPL-MCNC: 233 MG/DL

## 2023-02-07 PROCEDURE — 80048 BASIC METABOLIC PNL TOTAL CA: CPT | Performed by: FAMILY MEDICINE

## 2023-02-07 PROCEDURE — 99396 PREV VISIT EST AGE 40-64: CPT | Performed by: FAMILY MEDICINE

## 2023-02-07 PROCEDURE — 36415 COLL VENOUS BLD VENIPUNCTURE: CPT | Performed by: FAMILY MEDICINE

## 2023-02-07 PROCEDURE — 99214 OFFICE O/P EST MOD 30 MIN: CPT | Mod: 25 | Performed by: FAMILY MEDICINE

## 2023-02-07 PROCEDURE — 80061 LIPID PANEL: CPT | Performed by: FAMILY MEDICINE

## 2023-02-07 RX ORDER — TRAZODONE HYDROCHLORIDE 100 MG/1
TABLET ORAL
Qty: 180 TABLET | Refills: 1 | Status: SHIPPED | OUTPATIENT
Start: 2023-02-07 | End: 2023-09-07

## 2023-02-07 RX ORDER — ATENOLOL 50 MG/1
50 TABLET ORAL DAILY
Qty: 90 TABLET | Refills: 3 | Status: SHIPPED | OUTPATIENT
Start: 2023-02-07 | End: 2024-02-27

## 2023-02-07 RX ORDER — VENLAFAXINE HYDROCHLORIDE 150 MG/1
CAPSULE, EXTENDED RELEASE ORAL
Qty: 90 CAPSULE | Refills: 3 | Status: SHIPPED | OUTPATIENT
Start: 2023-02-07 | End: 2023-12-21

## 2023-02-07 RX ORDER — PRAVASTATIN SODIUM 40 MG
TABLET ORAL
Qty: 90 TABLET | Refills: 3 | Status: SHIPPED | OUTPATIENT
Start: 2023-02-07 | End: 2023-12-21

## 2023-02-07 RX ORDER — DIAZEPAM 5 MG
TABLET ORAL
Qty: 45 TABLET | Refills: 3 | Status: SHIPPED | OUTPATIENT
Start: 2023-02-07 | End: 2023-10-02

## 2023-02-07 RX ORDER — TRIAMTERENE AND HYDROCHLOROTHIAZIDE 37.5; 25 MG/1; MG/1
CAPSULE ORAL
Qty: 90 CAPSULE | Refills: 3 | Status: SHIPPED | OUTPATIENT
Start: 2023-02-07 | End: 2023-12-21

## 2023-02-07 ASSESSMENT — ENCOUNTER SYMPTOMS
ABDOMINAL PAIN: 0
HEMATURIA: 0
WEAKNESS: 0
DYSURIA: 0
BREAST MASS: 0
JOINT SWELLING: 0
MYALGIAS: 1
FEVER: 0
PALPITATIONS: 0
NERVOUS/ANXIOUS: 1
HEMATOCHEZIA: 0
CONSTIPATION: 0
DIARRHEA: 0
CHILLS: 0
HEADACHES: 1
ARTHRALGIAS: 1
FREQUENCY: 0
HEARTBURN: 1
COUGH: 0
DIZZINESS: 1
SORE THROAT: 0
NAUSEA: 0
SHORTNESS OF BREATH: 0
PARESTHESIAS: 0
EYE PAIN: 0

## 2023-02-07 NOTE — PATIENT INSTRUCTIONS

## 2023-02-07 NOTE — PROGRESS NOTES
SUBJECTIVE:   CC: Marlyn is an 62 year old who presents for preventive health visit.     Patient has been advised of split billing requirements and indicates understanding: Yes  Healthy Habits:     Getting at least 3 servings of Calcium per day:  NO    Bi-annual eye exam:  Yes    Dental care twice a year:  Yes    Sleep apnea or symptoms of sleep apnea:  Daytime drowsiness    Diet:  Regular (no restrictions)    Frequency of exercise:  1 day/week    Duration of exercise:  Less than 15 minutes    Taking medications regularly:  Yes    PHQ-2 Total Score: 2    Additional concerns today:  Yes            Today's PHQ-2 Score:   PHQ-2 ( 1999 [employer]) 2/1/2023   Q1: Little interest or pleasure in doing things 1   Q2: Feeling down, depressed or hopeless 1   PHQ-2 Score 2   PHQ-2 Total Score (12-17 Years)- Positive if 3 or more points; Administer PHQ-A if positive -   Q1: Little interest or pleasure in doing things Several days   Q2: Feeling down, depressed or hopeless Several days   PHQ-2 Score 2           Social History     Tobacco Use     Smoking status: Never     Smokeless tobacco: Never   Substance Use Topics     Alcohol use: Yes     Alcohol/week: 0.0 standard drinks     Comment: rare      If you drink alcohol do you typically have >3 drinks per day or >7 drinks per week? No    No flowsheet data found.    Reviewed orders with patient.  Reviewed health maintenance and updated orders accordingly - Yes  Lab work is in process    Breast Cancer Screening:    Breast CA Risk Assessment (FHS-7) 10/5/2021 5/3/2022   Do you have a family history of breast, colon, or ovarian cancer? Yes No / Unknown         Mammogram Screening: Recommended mammography every 1-2 years with patient discussion and risk factor consideration  Pertinent mammograms are reviewed under the imaging tab.    History of abnormal Pap smear: NO - age 30-65 PAP every 5 years with negative HPV co-testing recommended     Reviewed and updated as needed this visit by  clinical staff   Tobacco  Allergies  Meds  Problems  Med Hx  Surg Hx  Fam Hx          Reviewed and updated as needed this visit by Provider                 Past Medical History:   Diagnosis Date     Abnormal Papanicolaou smear of cervix and cervical HPV 1986     Anxiety      Anxiety disorder in conditions classified elsewhere      Calculus of kidney 08/11/1988    Calcium renal stone, oxylate     Calculus of ureter 06/30/1985    LEFT urteral stone     Excessive or frequent menstruation 07/07/2003     Labyrinthitis, unspecified      Lump or mass in breast 10/27/1999    RIGHT breast mass     Variants of migraine, not elsewhere classified, without mention of intractable migraine without mention of status migrainosus 02/07/2002        Review of Systems   Constitutional: Negative for chills and fever.   HENT: Positive for congestion and hearing loss. Negative for ear pain and sore throat.    Eyes: Negative for pain and visual disturbance.   Respiratory: Negative for cough and shortness of breath.    Cardiovascular: Positive for chest pain. Negative for palpitations and peripheral edema.   Gastrointestinal: Positive for heartburn. Negative for abdominal pain, constipation, diarrhea, hematochezia and nausea.   Breasts:  Negative for tenderness, breast mass and discharge.   Genitourinary: Negative for dysuria, frequency, genital sores, hematuria, pelvic pain, urgency, vaginal bleeding and vaginal discharge.   Musculoskeletal: Positive for arthralgias and myalgias. Negative for joint swelling.   Skin: Negative for rash.   Neurological: Positive for dizziness and headaches. Negative for weakness and paresthesias.   Psychiatric/Behavioral: Negative for mood changes. The patient is nervous/anxious.      She has a ne wstressful job and many family pressures. She is also here to follow up on the pancreatic cyst . She also has pain in the lateral hip area. Hurts with extended walking just started gradually . Does not  "interrupt sleep no weakness      OBJECTIVE:   /86 (BP Location: Right arm, Patient Position: Sitting, Cuff Size: Adult Regular)   Pulse 63   Temp 98.3  F (36.8  C) (Tympanic)   Ht 1.6 m (5' 3\")   Wt 94.3 kg (208 lb)   SpO2 98%   BMI 36.85 kg/m    Physical Exam  GENERAL APPEARANCE: healthy, alert and no distress  EYES: Eyes grossly normal to inspection, PERRL and conjunctivae and sclerae normal  HENT: ear canals and TM's normal, nose and mouth without ulcers or lesions, oropharynx clear and oral mucous membranes moist  NECK: no adenopathy, no asymmetry, masses, or scars and thyroid normal to palpation  RESP: lungs clear to auscultation - no rales, rhonchi or wheezes  BREAST: normal without masses, tenderness or nipple discharge and no palpable axillary masses or adenopathy  CV: regular rate and rhythm, normal S1 S2, no S3 or S4, no murmur, click or rub, no peripheral edema and peripheral pulses strong  ABDOMEN: soft, nontender, no hepatosplenomegaly, no masses and bowel sounds normal  MS: no musculoskeletal defects are noted and gait is age appropriate without ataxia  MS: RLE exam reveals normal strength and muscle mass, no deformities and tender over greater trochanter , LLE exam reveals normal strength and muscle mass and tender over greater trochanter    SKIN: no suspicious lesions or rashes  NEURO: Normal strength and tone, sensory exam grossly normal, mentation intact and speech normal  PSYCH: mentation appears normal and affect normal/bright    Diagnostic Test Results:  Labs reviewed in Epic  Results for orders placed or performed in visit on 02/07/23   Lipid panel reflex to direct LDL Non-fasting     Status: Abnormal   Result Value Ref Range    Cholesterol 171 <200 mg/dL    Triglycerides 233 (H) <150 mg/dL    Direct Measure HDL 40 (L) >=50 mg/dL    LDL Cholesterol Calculated 84 <=100 mg/dL    Non HDL Cholesterol 131 (H) <130 mg/dL    Narrative    Cholesterol  Desirable:  <200 " mg/dL    Triglycerides  Normal:  Less than 150 mg/dL  Borderline High:  150-199 mg/dL  High:  200-499 mg/dL  Very High:  Greater than or equal to 500 mg/dL    Direct Measure HDL  Female:  Greater than or equal to 50 mg/dL   Male:  Greater than or equal to 40 mg/dL    LDL Cholesterol  Desirable:  <100mg/dL  Above Desirable:  100-129 mg/dL   Borderline High:  130-159 mg/dL   High:  160-189 mg/dL   Very High:  >= 190 mg/dL    Non HDL Cholesterol  Desirable:  130 mg/dL  Above Desirable:  130-159 mg/dL  Borderline High:  160-189 mg/dL  High:  190-219 mg/dL  Very High:  Greater than or equal to 220 mg/dL   BASIC METABOLIC PANEL     Status: Abnormal   Result Value Ref Range    Sodium 138 136 - 145 mmol/L    Potassium 4.5 3.4 - 5.3 mmol/L    Chloride 98 98 - 107 mmol/L    Carbon Dioxide (CO2) 28 22 - 29 mmol/L    Anion Gap 12 7 - 15 mmol/L    Urea Nitrogen 14.1 8.0 - 23.0 mg/dL    Creatinine 0.84 0.51 - 0.95 mg/dL    Calcium 10.7 (H) 8.8 - 10.2 mg/dL    Glucose 87 70 - 99 mg/dL    GFR Estimate 79 >60 mL/min/1.73m2       ASSESSMENT/PLAN:   (Z00.00) Routine general medical examination at a health care facility  (primary encounter diagnosis)  Comment:   Plan:     (E78.1) Hypertriglyceridemia  Comment:   Plan: Lipid panel reflex to direct LDL Non-fasting        Better control     (I10) Essential hypertension, benign  Comment:   Plan: BASIC METABOLIC PANEL, atenolol (TENORMIN) 50         MG tablet, triamterene-HCTZ (DYAZIDE) 37.5-25         MG capsule        Well controlled     (E78.5) Hyperlipidemia LDL goal <130  Comment:   Plan: pravastatin (PRAVACHOL) 40 MG tablet        Cont    (F41.9) Anxiety  Comment:   Plan: venlafaxine (EFFEXOR XR) 150 MG 24 hr capsule,         diazepam (VALIUM) 5 MG tablet        Will add extra valium as she is dealing with a father who had MI 2 days ago and her own health and her sisters cancer     (K86.2) Pancreatic cyst  Comment:   Plan: MR Pancreas w/o & w Contrast        Follow up time     (F06.4)  "Anxiety disorder due to medical condition  Comment:   Plan: diazepam (VALIUM) 5 MG tablet        Will increase short erm     (F51.01) Primary insomnia  Comment:   Plan: traZODone (DESYREL) 100 MG tablet        Cont 2 max     (M70.61,  M70.62) Greater trochanteric bursitis of both hips  Comment:   Plan: Orthopedic  Referral        rec referral if worsening to consider steroid injeciton              COUNSELING:  Reviewed preventive health counseling, as reflected in patient instructions      BMI:   Estimated body mass index is 36.85 kg/m  as calculated from the following:    Height as of this encounter: 1.6 m (5' 3\").    Weight as of this encounter: 94.3 kg (208 lb).   Weight management plan: Discussed healthy diet and exercise guidelines  She will follow up for the hips so that exercise and be increased       She reports that she has never smoked. She has never used smokeless tobacco.          Carlie Real MD  [facility]  "

## 2023-02-15 ENCOUNTER — HOSPITAL ENCOUNTER (OUTPATIENT)
Dept: MAMMOGRAPHY | Facility: CLINIC | Age: 63
Discharge: HOME | End: 2023-02-15
Attending: FAMILY MEDICINE | Admitting: FAMILY MEDICINE
Payer: COMMERCIAL

## 2023-02-15 DIAGNOSIS — Z12.31 VISIT FOR SCREENING MAMMOGRAM: ICD-10-CM

## 2023-02-15 PROCEDURE — 77067 SCR MAMMO BI INCL CAD: CPT

## 2023-02-17 ENCOUNTER — HOSPITAL ENCOUNTER (OUTPATIENT)
Dept: MRI IMAGING | Facility: CLINIC | Age: 63
Discharge: HOME | End: 2023-02-17
Attending: FAMILY MEDICINE | Admitting: FAMILY MEDICINE
Payer: COMMERCIAL

## 2023-02-17 DIAGNOSIS — K86.2 PANCREATIC CYST: ICD-10-CM

## 2023-02-17 PROCEDURE — 255N000002 HC RX 255 OP 636: Performed by: FAMILY MEDICINE

## 2023-02-17 PROCEDURE — 258N000003 HC RX IP 258 OP 636: Performed by: FAMILY MEDICINE

## 2023-02-17 PROCEDURE — A9585 GADOBUTROL INJECTION: HCPCS | Performed by: FAMILY MEDICINE

## 2023-02-17 PROCEDURE — 74183 MRI ABD W/O CNTR FLWD CNTR: CPT

## 2023-02-17 RX ORDER — GADOBUTROL 604.72 MG/ML
9 INJECTION INTRAVENOUS ONCE
Status: COMPLETED | OUTPATIENT
Start: 2023-02-17 | End: 2023-02-17

## 2023-02-17 RX ADMIN — SODIUM CHLORIDE 50 ML: 9 INJECTION, SOLUTION INTRAVENOUS at 08:06

## 2023-02-17 RX ADMIN — GADOBUTROL 9 ML: 604.72 INJECTION INTRAVENOUS at 07:49

## 2023-04-12 ENCOUNTER — HOSPITAL ENCOUNTER (EMERGENCY)
Facility: CLINIC | Age: 63
Discharge: HOME | End: 2023-04-12
Payer: COMMERCIAL

## 2023-04-12 ENCOUNTER — APPOINTMENT (OUTPATIENT)
Dept: GENERAL RADIOLOGY | Facility: CLINIC | Age: 63
End: 2023-04-12
Payer: COMMERCIAL

## 2023-04-12 VITALS
BODY MASS INDEX: 34.15 KG/M2 | HEIGHT: 64 IN | TEMPERATURE: 99 F | RESPIRATION RATE: 16 BRPM | WEIGHT: 200 LBS | OXYGEN SATURATION: 96 % | DIASTOLIC BLOOD PRESSURE: 52 MMHG | HEART RATE: 69 BPM | SYSTOLIC BLOOD PRESSURE: 125 MMHG

## 2023-04-12 DIAGNOSIS — M25.579 ANKLE PAIN: ICD-10-CM

## 2023-04-12 PROCEDURE — G0463 HOSPITAL OUTPT CLINIC VISIT: HCPCS | Mod: 25

## 2023-04-12 PROCEDURE — 73610 X-RAY EXAM OF ANKLE: CPT | Mod: RT

## 2023-04-12 PROCEDURE — 99213 OFFICE O/P EST LOW 20 MIN: CPT | Mod: 25

## 2023-04-12 ASSESSMENT — ENCOUNTER SYMPTOMS
ARTHRALGIAS: 1
FEVER: 0
JOINT SWELLING: 1

## 2023-04-12 NOTE — DISCHARGE INSTRUCTIONS
Wear the air splint as needed when up and moving. Rest, Ice, elevation, tylenol and ibuprofen as needed for pain. Follow-up with ortho for continued symptoms. Return sooner for any other new concerns or for worsening.

## 2023-04-12 NOTE — LETTER
April 12, 2023      To Whom It May Concern:      Kenya Rubalcava was seen in our Urgent Care today, 04/12/23.  I expect her condition to improve over the next few days.  She may return to work/school 04/17/2023 or sooner.     Sincerely,        SUSY Nicole CNP

## 2023-04-12 NOTE — ED PROVIDER NOTES
History     Chief Complaint   Patient presents with     Ankle Pain     Right ankle pain started Sunday, known injury     HPI  Kenya Rubalcava is a 62 year old female who presents urgent care for complaints of right ankle pain.  Patient reports that she was helping her grandchildren and yesterday hunts at a zoo 3 days ago.  States noting any significant injury at that time however she does think she may have rotated oddly.  Reports she has had increasing pain over the past 3 days particularly in the posterior and lateral aspect of the ankle.  She denies any foot pain.  Patient is also noted development of swelling in the lateral aspect of the foot.  She has been resting, elevating, and using ice intermittently.  She reports has been able to bear weight but has been increasingly difficult due to the increasing pain.  Reports history of Achilles tendinitis in the past.  Denies any other significant concerns today.    Allergies:  Allergies   Allergen Reactions     Ivp Dye [Contrast Dye] Anaphylaxis and Hives     Cats Anaphylaxis     Niaspan [Niacin] Rash     Pollen Extract/Tree Extract        Problem List:    Patient Active Problem List    Diagnosis Date Noted     Chronic pansinusitis 03/29/2022     Priority: Medium     Added automatically from request for surgery [id number]       Chronic tonsillitis 03/29/2022     Priority: Medium     Added automatically from request for surgery [id number]       Obesity (BMI 35.0-39.9) with comorbidity (H) 07/19/2019     Priority: Medium     Primary insomnia 07/19/2019     Priority: Medium     Anxiety 05/14/2015     Priority: Medium     Meniere disease 11/11/2010     Priority: Medium     Diagnosed 10/10 - active in left ear. Trying dyazide daily, serax twice daily, meclizine qid        Kidney stones 06/15/2009     Priority: Medium     Bilateral noted on CT 5/23/09       Hypertriglyceridemia 06/15/2009     Priority: Medium     Hyperlipidemia LDL goal <130 06/30/2008     Priority: Medium  "    Recent Labs   Lab Test 11/26/10 0847 7/1/10 0927     CHOL 188 164     HDL 28* 27*     LDL Cannot estimate LDL when triglyceride exceeds 400 mg/dL93 Cannot estimate LDL when triglyceride exceeds 400 mg/dL     TRIG 443* 431*     CHOLHDLRATIO 7.0* 6.0*     11/29/10 - Just added fenofibrate and recheck in 2 months lipid/ast        Psoriasis 05/22/2008     Priority: Medium     Less than 1% body surface area - 1% ointment Triamcinalone, scalp Head&Shoulders or Nizoral [place] [facility] prescriptions.       Symptomatic menopausal or female climacteric states 06/25/2007     Priority: Medium     Peripheral vertigo 09/06/2006     Priority: Medium     Problem list name updated by automated process. Provider to review       Essential hypertension, benign 05/02/2006     Priority: Medium     Anxiety disorder due to medical condition      Priority: Medium     Treatment tried:  Buspar 7.5 mg bid (stopped 03/21/2005), Celexa started 03/21/2005    Problem list name updated by automated process. Provider to review       Allergic rhinitis 06/16/2004     Priority: Medium     Environmental allergies  Problem list name updated by automated process. Provider to review       Sensorineural hearing loss 07/05/2002     Priority: Medium     Metal \"Esteem\" implant in the right posterior head  - placed approximately 2011    Referred to Dr Street, [facility] by previous clinic  Problem list name updated by automated process. Provider to review            Past Medical History:    Past Medical History:   Diagnosis Date     Abnormal Papanicolaou smear of cervix and cervical HPV 1986     Anxiety      Anxiety disorder in conditions classified elsewhere      Calculus of kidney 08/11/1988     Calculus of ureter 06/30/1985     Excessive or frequent menstruation 07/07/2003     Labyrinthitis, unspecified      Lump or mass in breast 10/27/1999     Variants of migraine, not elsewhere classified, without mention of intractable migraine without " mention of status migrainosus 02/07/2002       Past Surgical History:    Past Surgical History:   Procedure Laterality Date     CARPAL TUNNEL RELEASE RT/LT  08/01/2008    Left      COLONOSCOPY  08/07/2012    Procedure: COLONOSCOPY;  Colonoscopy;  Surgeon: Kailyn Lopez MD;  Location: [facility]     COLONOSCOPY N/A 09/21/2022    Procedure: COLONOSCOPY;  Surgeon: Denton Montilla DO;  Location: [facility]     CYSTOSCOPY, RETROGRADES, INSERT STENT URETER(S), COMBINED Left 08/19/2016    Procedure: COMBINED CYSTOSCOPY, RETROGRADES, INSERT STENT URETER(S);  Surgeon: Terence Carpenter MD;  Location: [facility]     esteem implant  06/23/2011    Hearing implant right ear.     HYSTERECTOMY, PAP NO LONGER INDICATED  12/01/2004    Vaginal hysterectomy     LAPAROSCOPIC ABLATION ENDOMETRIOSIS  07/01/2004     LASER HOLMIUM LITHOTRIPSY URETER(S), INSERT STENT, COMBINED Left 09/12/2016    Procedure: COMBINED CYSTOSCOPY, URETEROSCOPY, LASER HOLMIUM LITHOTRIPSY URETER(S), INSERT STENT;  Surgeon: Kailyn Dozier MD;  Location: [facility]     LEFT wrist surgery       OPTICAL TRACKING SYSTEM ENDOSCOPIC SINUS SURGERY Bilateral 05/12/2022    Procedure: IMAGE GUIDED FUNCTIONAL BILATERAL  ENDOSCOPIC SINUS SURGERY (FESS) WITHOUT SEPTOPLASTY;  Surgeon: Dane Lowery MD;  Location: [facility]     Sinus surgery with repair of deviated septum       TONSILLECTOMY, ADENOIDECTOMY ADULT, COMBINED Bilateral 05/12/2022    Procedure: BILATERAL TONSILLECTOMY;  Surgeon: Dane Lowery MD;  Location: [facility]     TUBAL LIGATION         Family History:    Family History   Problem Relation Age of Onset     Heart Disease Brother      Hypertension Brother      Asthma Daughter      Heart Disease Brother      Hypertension Brother      C.A.D. Father         MI      Hypertension Father      Lipids Father 50     C.A.D. Paternal Aunt         MI-50s     Cancer Maternal Aunt         Ovarian CA     Cerebrovascular Disease No family hx of       "Breast Cancer No family hx of      Cancer - colorectal No family hx of      Prostate Cancer No family hx of        Social History:  Marital Status:  Single [1]  Social History     Tobacco Use     Smoking status: Never     Smokeless tobacco: Never   Substance Use Topics     Alcohol use: Yes     Alcohol/week: 0.0 standard drinks of alcohol     Comment: rare      Drug use: No        Medications:    ASPIRIN 325 MG OR TBEC  atenolol (TENORMIN) 50 MG tablet  betamethasone dipropionate (DIPROSONE) 0.05 % external ointment  budesonide (PULMICORT) 0.5 MG/2ML neb solution  diazepam (VALIUM) 5 MG tablet  DIPHENHYDRAMINE HCL PO  fluocinonide (LIDEX) 0.05 % external solution  fluticasone (FLONASE) 50 MCG/ACT nasal spray  hydrOXYzine (VISTARIL) 50 MG capsule  levocetirizine (XYZAL) 5 MG tablet  meclizine (ANTIVERT) 25 MG tablet  pravastatin (PRAVACHOL) 40 MG tablet  prochlorperazine (COMPAZINE) 10 MG tablet  traZODone (DESYREL) 100 MG tablet  triamterene-HCTZ (DYAZIDE) 37.5-25 MG capsule  venlafaxine (EFFEXOR XR) 150 MG 24 hr capsule          Review of Systems   Constitutional: Negative for fever.   Musculoskeletal: Positive for arthralgias and joint swelling.   Skin: Negative for rash.   All other systems reviewed and are negative.      Physical Exam   BP: 125/52  Pulse: 69  Temp: 99  F (37.2  C)  Resp: 16  Height: 162.6 cm (5' 4\")  Weight: 90.7 kg (200 lb)  SpO2: 96 %      Physical Exam  Constitutional:       Appearance: Normal appearance.   HENT:      Nose: Nose normal.      Mouth/Throat:      Mouth: Mucous membranes are moist.   Eyes:      General: No scleral icterus.     Conjunctiva/sclera: Conjunctivae normal.   Cardiovascular:      Rate and Rhythm: Normal rate.   Pulmonary:      Effort: Pulmonary effort is normal.   Musculoskeletal:      Cervical back: Normal range of motion.      Right ankle: Swelling present. No deformity, ecchymosis or lacerations. Tenderness present. No lateral malleolus tenderness. Decreased range of " motion. Normal pulse.      Right Achilles Tendon: No tenderness or defects.      Left ankle: Normal.   Skin:     General: Skin is warm and dry.      Capillary Refill: Capillary refill takes less than 2 seconds.      Findings: No erythema or rash.   Neurological:      Mental Status: She is alert.         ED Course                 Procedures        Results for orders placed or performed during the hospital encounter of 04/12/23 (from the past 24 hour(s))   Ankle XR, G/E 3 views, right    Narrative    ANKLE THREE VIEWS RIGHT  4/12/2023 4:07 PM     HISTORY: lateral ankle pain and swelling  COMPARISON: 12/14/2016      Impression    IMPRESSION: No acute fracture or malalignment. Chronic ossicle at the  medial malleolus distally. No significant degenerative changes. The  ankle mortise is congruent. The talar dome is unremarkable. Moderate  plantar and Achilles calcaneal enthesophytes.    ROWDY WILSON MD         SYSTEM ID:  [id number]       Medications - No data to display    Assessments & Plan (with Medical Decision Making)   Patient presented to urgent care for concern of right ankle pain and swelling.  X-ray of the ankle was performed today which did not indicate any acute fracture, dislocation, or other acute pathology at this time.  Given the tenacious onset of symptoms this is likely that this could be an overuse injury such as tendinitis.  Given the history of possibly twisting and turning wrong along with the significant swelling noted over the lateral aspect of the ankle, it is also possible that this is a lateral ankle sprain.  There is no significant laxity noted on exam there is no indication of neurovascular compromise.  Normal capillary refill and pedal pulses.  As such, patient was provided with air splint to use with ambulation as needed.  Also recommended elevation, ice, rest, Tylenol, and ibuprofen as needed for swelling and discomfort.  Patient is provided with a work note as requested for the next  few days.  Placed referral for orthopedics if patient continues to have prolonged symptoms or if not improving.  She should return sooner for worsening symptoms.  Patient is agreeable to above plan and she is discharged in good condition.      I have reviewed the nursing notes.    I have reviewed the findings, diagnosis, plan and need for follow up with the patient.      Discharge Medication List as of 4/12/2023  4:42 PM          Final diagnoses:   Ankle pain - tendonitis vs ankle sprain       4/12/2023   [facility] EMERGENCY DEPT    Disclaimer:  This note consists of symbols derived from keyboarding, dictation and/or voice recognition software.  As a result, there may be errors in the script that have gone undetected.  Please consider this when interpreting information found in this chart.           José Torres APRN CNP  04/12/23 2024

## 2023-06-28 NOTE — PROGRESS NOTES
ASSESSMENT & PLAN    Kenya was seen today for pain.    Diagnoses and all orders for this visit:    Bone spur  -     Physical Therapy Referral; Future  -     Orthotics and Prosthetics DME Orthotic; Foot Orthotics    Achilles tendinosis  -     XR Ankle RT G/E 3 vw  -     Orthopedic  Referral  -     Physical Therapy Referral; Future  -     Orthotics and Prosthetics DME Orthotic; Foot Orthotics    Achilles bursitis of both lower extremities      This issue is acute on chronic and Unchanged.    Symptoms most consistent with Achilles tendinosis and insertional bursitis along with peroneal tendinosis.  We discussed potential treatment including walking boot immobilization for 1-2 weeks, consideration of heel cups, ankle braces or shoe inserts.  Physical therapy.  Given improvement patient will continue with heel cups, rest and ice for now.  Will consider physical therapy and walking boot pending clinical course.    Plan:  - Today's Plan of Care:  Custom Orthotics  Rehab: Physical Therapy: [facility] - [phone]    Discussed activity considerations and other supportive care including Ice/Heat, OTC and other topical medications as needed.      -We also discussed other future treatment options:  Referral to Podiatry  MRI if more severe  US guided Achilles injection    Follow Up: as needed    Concerning signs and symptoms were reviewed and all questions were answered at this time.    Bev Lao MD [facility]  Sports Medicine Physician  [facility]      -----  Chief Complaint   Patient presents with     Right Ankle - Pain       SUBJECTIVE  Kenyase EWA Rubalcava is a/an 62 year old female who is seen as an ER referral for evaluation of right ankle pain.     The patient is seen by themselves.    Onset: 2 years(s) ago. Patient describes injury as while [place] came home and the ankle was sore for 3 weeks. Last winter she had a similar issue happen.    Location of  Pain: right ankle; lateral, posterior, pain radiates to the gastroc/soleus   Worsened by: wearing unsupportive shoes, walking on uneven ground   Better with: advil, elevation, icing   Treatments tried: rest/activity avoidance, elevation, ice, ibuprofen, previous imaging (xray 4/12/23) and casting/splinting/bracing, shoe inserts   Associated symptoms: swelling, warmth, redness and very painful with weight bearing at times, instability    Orthopedic/Surgical history: YES - Date: chronic right heel pain, previously saw me in 2019  Social History/Occupation: working on her feet a lot during work     REVIEW OF SYSTEMS:  Review of Systems    OBJECTIVE:  /83   Pulse 61   Wt 90.7 kg (200 lb)   BMI 34.33 kg/m     GENERAL APPEARANCE: healthy, alert and no distress   GAIT: normal  SKIN: no suspicious lesions or rashes  HEENT: Sclera clear, anicteric  CV: no lower extremity edema  RESP: Breathing not labored  NEURO: Normal strength and tone, mentation intact and speech normal  PSYCH:  mentation appears normal and affect normal/bright     Bilateral Foot and Ankle Exam:  Inspection:       no visible ecchymosis       no visible edema or effusion     Foot inspection:       pes planus     Tender:       Posterior achilles with some swelling - right     Non-Tender:       remainder of ankle and foot bilateral     ROM:        Full active and passive ROM, ankle dorsiflexion, plantarflexion, inversion, eversion, great toe dorsiflexion, remainder of toes, midfoot and subtalar bilateral     Strength:       ankle dorsiflexion 5/5 bilateral       plantarflexion 5/5 bilateral       inversion 5/5 bilateral       eversion 5/5 bilateral     Special Tests:       neg (-) anterior drawer right       neg (-) talar tilt right     Gait:       normal     Lower extremity alignment:       Normal     Neurovascular:       2+ peripheral pulses bilaterally and brisk capillary refill       sensation grossly intact       RADIOLOGY:  Final results and  radiologist's interpretation, available in the [facility] health record.  Images were reviewed with the patient in the office today.  My personal interpretation of the performed imaging:   3 XR views of right ankle reviewed: no acute bony abnormality, mild degenerative change with achilles and plantar bone spurs  - will follow official read    Review of the result(s) of each unique test - XRs

## 2023-06-29 ENCOUNTER — ANCILLARY PROCEDURE (OUTPATIENT)
Dept: GENERAL RADIOLOGY | Facility: CLINIC | Age: 63
End: 2023-06-29
Attending: PEDIATRICS
Payer: COMMERCIAL

## 2023-06-29 ENCOUNTER — OFFICE VISIT (OUTPATIENT)
Dept: ORTHOPEDICS | Facility: CLINIC | Age: 63
End: 2023-06-29
Payer: COMMERCIAL

## 2023-06-29 VITALS
BODY MASS INDEX: 34.33 KG/M2 | HEART RATE: 61 BPM | DIASTOLIC BLOOD PRESSURE: 83 MMHG | SYSTOLIC BLOOD PRESSURE: 122 MMHG | WEIGHT: 200 LBS

## 2023-06-29 DIAGNOSIS — M76.61 ACHILLES BURSITIS OF BOTH LOWER EXTREMITIES: ICD-10-CM

## 2023-06-29 DIAGNOSIS — M77.9 BONE SPUR: Primary | ICD-10-CM

## 2023-06-29 DIAGNOSIS — M67.88 ACHILLES TENDINOSIS: ICD-10-CM

## 2023-06-29 DIAGNOSIS — M76.62 ACHILLES BURSITIS OF BOTH LOWER EXTREMITIES: ICD-10-CM

## 2023-06-29 PROCEDURE — 73610 X-RAY EXAM OF ANKLE: CPT | Mod: TC | Performed by: RADIOLOGY

## 2023-06-29 PROCEDURE — 99203 OFFICE O/P NEW LOW 30 MIN: CPT | Performed by: PEDIATRICS

## 2023-06-29 NOTE — LETTER
6/29/2023         RE: Kenya Rubalcava  [address] [place] [address]        Dear Colleague,    Thank you for referring your patient, Kenya Rubalcava, to the [facility]. Please see a copy of my visit note below.    ASSESSMENT & PLAN    Kenya was seen today for pain.    Diagnoses and all orders for this visit:    Bone spur  -     Physical Therapy Referral; Future  -     Orthotics and Prosthetics DME Orthotic; Foot Orthotics    Achilles tendinosis  -     XR Ankle RT G/E 3 vw  -     Orthopedic  Referral  -     Physical Therapy Referral; Future  -     Orthotics and Prosthetics DME Orthotic; Foot Orthotics    Achilles bursitis of both lower extremities      This issue is acute on chronic and Unchanged.    Symptoms most consistent with Achilles tendinosis and insertional bursitis along with peroneal tendinosis.  We discussed potential treatment including walking boot immobilization for 1-2 weeks, consideration of heel cups, ankle braces or shoe inserts.  Physical therapy.  Given improvement patient will continue with heel cups, rest and ice for now.  Will consider physical therapy and walking boot pending clinical course.    Plan:  - Today's Plan of Care:  Custom Orthotics  Rehab: Physical Therapy: [facility] - [phone]    Discussed activity considerations and other supportive care including Ice/Heat, OTC and other topical medications as needed.      -We also discussed other future treatment options:  Referral to Podiatry  MRI if more severe  US guided Achilles injection    Follow Up: as needed    Concerning signs and symptoms were reviewed and all questions were answered at this time.    Bev Lao MD [facility]  Sports Medicine Physician  [facility]      -----  Chief Complaint   Patient presents with     Right Ankle - Pain       SUBJECTIVE  Kenya Rubalcava is a/an 62 year old female who is seen as an ER referral for  evaluation of right ankle pain.     The patient is seen by themselves.    Onset: 2 years(s) ago. Patient describes injury as while [place] came home and the ankle was sore for 3 weeks. Last winter she had a similar issue happen.    Location of Pain: right ankle; lateral, posterior, pain radiates to the gastroc/soleus   Worsened by: wearing unsupportive shoes, walking on uneven ground   Better with: advil, elevation, icing   Treatments tried: rest/activity avoidance, elevation, ice, ibuprofen, previous imaging (xray 4/12/23) and casting/splinting/bracing, shoe inserts   Associated symptoms: swelling, warmth, redness and very painful with weight bearing at times, instability    Orthopedic/Surgical history: YES - Date: chronic right heel pain, previously saw me in 2019  Social History/Occupation: working on her feet a lot during work     REVIEW OF SYSTEMS:  Review of Systems    OBJECTIVE:  /83   Pulse 61   Wt 90.7 kg (200 lb)   BMI 34.33 kg/m     GENERAL APPEARANCE: healthy, alert and no distress   GAIT: normal  SKIN: no suspicious lesions or rashes  HEENT: Sclera clear, anicteric  CV: no lower extremity edema  RESP: Breathing not labored  NEURO: Normal strength and tone, mentation intact and speech normal  PSYCH:  mentation appears normal and affect normal/bright     Bilateral Foot and Ankle Exam:  Inspection:       no visible ecchymosis       no visible edema or effusion     Foot inspection:       pes planus     Tender:       Posterior achilles with some swelling - right     Non-Tender:       remainder of ankle and foot bilateral     ROM:        Full active and passive ROM, ankle dorsiflexion, plantarflexion, inversion, eversion, great toe dorsiflexion, remainder of toes, midfoot and subtalar bilateral     Strength:       ankle dorsiflexion 5/5 bilateral       plantarflexion 5/5 bilateral       inversion 5/5 bilateral       eversion 5/5 bilateral     Special Tests:       neg  (-) anterior drawer right       neg (-) talar tilt right     Gait:       normal     Lower extremity alignment:       Normal     Neurovascular:       2+ peripheral pulses bilaterally and brisk capillary refill       sensation grossly intact       RADIOLOGY:  Final results and radiologist's interpretation, available in the [facility] health record.  Images were reviewed with the patient in the office today.  My personal interpretation of the performed imaging:   3 XR views of right ankle reviewed: no acute bony abnormality, mild degenerative change with achilles and plantar bone spurs  - will follow official read    Review of the result(s) of each unique test - XRs             Again, thank you for allowing me to participate in the care of your patient.        Sincerely,        Bev Lao MD

## 2023-06-29 NOTE — PATIENT INSTRUCTIONS
Symptoms most consistent with Achilles tendinosis and insertional bursitis along with peroneal tendinosis.  We discussed potential treatment including walking boot immobilization for 1-2 weeks, consideration of heel cups, ankle braces or shoe inserts.  Physical therapy.  Given improvement patient will continue with heel cups, rest and ice for now.  Will consider physical therapy and walking boot pending clinical course.    Plan:  - Today's Plan of Care:  Custom Orthotics  [facility] [phone]    Discussed activity considerations and other supportive care including Ice/Heat, OTC and other topical medications as needed.      -We also discussed other future treatment options:  Referral to Podiatry  MRI if more severe  US guided Achilles injection    Follow Up: as needed    If you have any further questions for your physician or physician s care team you can call [phone] and use option 3 to leave a voice message.     For questions about the cost of your visit, or the cost of any procedure, lab or imaging study, please contact our [facility] PRICE LINE at [phone] for an estimate.  You may also contact them at [email or web address]     You will be asked to provide your name, birthdate, address, phone number, and insurance information.  You will also need to know the name of any specific test or procedure which is planned.  This often requires the CPT (common procedural terminology) code for the test or procedure.  Our clinic staff can help you get that information, if needed.    For information about how your insurance will cover your clinic visit, please call the customer service number on your insurance card.

## 2023-09-01 DIAGNOSIS — F51.01 PRIMARY INSOMNIA: ICD-10-CM

## 2023-09-07 DIAGNOSIS — J32.4 CHRONIC PANSINUSITIS: ICD-10-CM

## 2023-09-07 DIAGNOSIS — J30.2 SEASONAL ALLERGIC RHINITIS, UNSPECIFIED TRIGGER: ICD-10-CM

## 2023-09-07 RX ORDER — LEVOCETIRIZINE DIHYDROCHLORIDE 5 MG/1
5 TABLET, FILM COATED ORAL EVERY EVENING
Qty: 90 TABLET | Refills: 3 | Status: SHIPPED | OUTPATIENT
Start: 2023-09-07

## 2023-09-07 RX ORDER — TRAZODONE HYDROCHLORIDE 100 MG/1
TABLET ORAL
Qty: 180 TABLET | Refills: 0 | Status: SHIPPED | OUTPATIENT
Start: 2023-09-07 | End: 2023-12-12

## 2023-09-11 ENCOUNTER — OFFICE VISIT (OUTPATIENT)
Dept: DERMATOLOGY | Facility: CLINIC | Age: 63
End: 2023-09-11
Payer: COMMERCIAL

## 2023-09-11 DIAGNOSIS — L71.9 ROSACEA: Primary | ICD-10-CM

## 2023-09-11 DIAGNOSIS — L82.0 INFLAMED SEBORRHEIC KERATOSIS: ICD-10-CM

## 2023-09-11 DIAGNOSIS — L82.1 SEBORRHEIC KERATOSIS: ICD-10-CM

## 2023-09-11 DIAGNOSIS — L81.4 LENTIGO: ICD-10-CM

## 2023-09-11 DIAGNOSIS — D22.9 NEVUS: ICD-10-CM

## 2023-09-11 DIAGNOSIS — L57.0 ACTINIC KERATOSIS: ICD-10-CM

## 2023-09-11 DIAGNOSIS — D18.01 ANGIOMA OF SKIN: ICD-10-CM

## 2023-09-11 PROCEDURE — 17110 DESTRUCTION B9 LES UP TO 14: CPT | Performed by: PHYSICIAN ASSISTANT

## 2023-09-11 PROCEDURE — 17000 DESTRUCT PREMALG LESION: CPT | Mod: 59 | Performed by: PHYSICIAN ASSISTANT

## 2023-09-11 PROCEDURE — 99213 OFFICE O/P EST LOW 20 MIN: CPT | Mod: 25 | Performed by: PHYSICIAN ASSISTANT

## 2023-09-11 RX ORDER — METRONIDAZOLE 7.5 MG/G
GEL TOPICAL
Qty: 45 G | Refills: 11 | Status: SHIPPED | OUTPATIENT
Start: 2023-09-11

## 2023-09-11 NOTE — LETTER
9/11/2023         RE: Kenya Rubalcava  [address] [place] [address]        Dear Colleague,    Thank you for referring your patient, Kenya Rubalcava, to the [facility]. Please see a copy of my visit note below.    HPI:   Chief complaints: Kenya Rubalcava is a pleasant 63 year old female who presents for Full skin cancer screening to rule out skin cancer   Last Skin Exam: 2 years ago     1st Baseline: no  Personal HX of Skin Cancer: No  Personal HX of Malignant Melanoma: no   Family HX of Skin Cancer / Malignant Melanoma: no  Personal HX of Atypical Moles:   no  Risk factors: history of sun exposure and burns  New / Changing lesions:yes few scaly spots  Social History: just retired 1 week ago!  On review of systems, there are no further skin complaints, patient is feeling otherwise well.   ROS of the following were done and are negative: Constitutional, Eyes, Ears, Nose,   Mouth, Throat, Cardiovascular, Respiratory, GI, Genitourinary, Musculoskeletal,   Psychiatric, Endocrine, Allergic/Immunologic.    PHYSICAL EXAM:   There were no vitals taken for this visit.  Skin exam performed as follows: Type 2 skin. Mood appropriate  Alert and Oriented X 3. Well developed, well nourished in no distress.  General appearance: Normal  Head including face: Normal  Eyes: conjunctiva and lids: Normal  Mouth: Lips, teeth, gums: Normal  Neck: Normal  Chest-breast/axillae: Normal  Back: Normal  Spleen and liver: Normal  Cardiovascular: Exam of peripheral vascular system by observation for swelling, varicosities, edema: Normal  Genitalia: groin, buttocks: Normal  Extremities: digits/nails (clubbing): Normal  Eccrine and Apocrine glands: Normal  Right upper extremity: Normal  Left upper extremity: Normal  Right lower extremity: Normal  Left lower extremity: Normal  Skin: Scalp and body hair: See below    Pt deferred exam of breasts, groin, buttocks: No    Other physical findings:  1. Multiple  pigmented macules on extremities and trunk  2. Multiple pigmented macules on face, trunk and extremities  3. Multiple vascular papules on trunk, arms and legs  4. Multiple scattered keratotic plaques  5. Pink gritty papule on the right upper lip x 1  6. Inflamed keratotic papule on the right cheek x 3, left cheek x 2       Except as noted above, no other signs of skin cancer or melanoma.     ASSESSMENT/PLAN:   Benign Full skin cancer screening today. . Patient with history of none  Advised on monthly self exams and 1 year  Patient Education: Appropriate brochures given.    Multiple benign appearing melanocytic nevi on arms, legs and trunk. Discussed ABCDEs of melanoma and sunscreen.   Multiple lentigos on arms, legs and trunk. Advised benign, no treatment needed.  Multiple scattered angiomas. Advised benign, no treatment needed.   Seborrheic keratosis on arms, legs and trunk. Advised benign, no treatment needed.  Actinic keratosis on the right upper lip x 1. As precancerous, cryosurgery performed. Advised on blistering and post-op care. Advised if not resolved in 1-2 months to return for evaluation  Inflamed seborrheic keratosis on the right cheek x 3, left cheek x 2. As physically tender cryosurgery performed. Advised on post op care.   Rosacea on the face  --Start metrogel BID PRN              Follow-up: yearly    1.) Patient was asked about new and changing moles. YES  2.) Patient received a complete physical skin examination: YES  3.) Patient was counseled to perform a monthly self skin examination: YES  Scribed By: Desire Norris, MS, PAMIKEL      Again, thank you for allowing me to participate in the care of your patient.        Sincerely,        Desire Norris PA-C

## 2023-09-11 NOTE — PROGRESS NOTES
HPI:   Chief complaints: Kenya Rubalcava is a pleasant 63 year old female who presents for Full skin cancer screening to rule out skin cancer   Last Skin Exam: 2 years ago     1st Baseline: no  Personal HX of Skin Cancer: No  Personal HX of Malignant Melanoma: no   Family HX of Skin Cancer / Malignant Melanoma: no  Personal HX of Atypical Moles:   no  Risk factors: history of sun exposure and burns  New / Changing lesions:yes few scaly spots  Social History: just retired 1 week ago!  On review of systems, there are no further skin complaints, patient is feeling otherwise well.   ROS of the following were done and are negative: Constitutional, Eyes, Ears, Nose,   Mouth, Throat, Cardiovascular, Respiratory, GI, Genitourinary, Musculoskeletal,   Psychiatric, Endocrine, Allergic/Immunologic.    PHYSICAL EXAM:   There were no vitals taken for this visit.  Skin exam performed as follows: Type 2 skin. Mood appropriate  Alert and Oriented X 3. Well developed, well nourished in no distress.  General appearance: Normal  Head including face: Normal  Eyes: conjunctiva and lids: Normal  Mouth: Lips, teeth, gums: Normal  Neck: Normal  Chest-breast/axillae: Normal  Back: Normal  Spleen and liver: Normal  Cardiovascular: Exam of peripheral vascular system by observation for swelling, varicosities, edema: Normal  Genitalia: groin, buttocks: Normal  Extremities: digits/nails (clubbing): Normal  Eccrine and Apocrine glands: Normal  Right upper extremity: Normal  Left upper extremity: Normal  Right lower extremity: Normal  Left lower extremity: Normal  Skin: Scalp and body hair: See below    Pt deferred exam of breasts, groin, buttocks: No    Other physical findings:  1. Multiple pigmented macules on extremities and trunk  2. Multiple pigmented macules on face, trunk and extremities  3. Multiple vascular papules on trunk, arms and legs  4. Multiple scattered keratotic plaques  5. Pink gritty papule on the right upper lip x 1  6.  Inflamed keratotic papule on the right cheek x 3, left cheek x 2       Except as noted above, no other signs of skin cancer or melanoma.     ASSESSMENT/PLAN:   Benign Full skin cancer screening today. . Patient with history of none  Advised on monthly self exams and 1 year  Patient Education: Appropriate brochures given.    Multiple benign appearing melanocytic nevi on arms, legs and trunk. Discussed ABCDEs of melanoma and sunscreen.   Multiple lentigos on arms, legs and trunk. Advised benign, no treatment needed.  Multiple scattered angiomas. Advised benign, no treatment needed.   Seborrheic keratosis on arms, legs and trunk. Advised benign, no treatment needed.  Actinic keratosis on the right upper lip x 1. As precancerous, cryosurgery performed. Advised on blistering and post-op care. Advised if not resolved in 1-2 months to return for evaluation  Inflamed seborrheic keratosis on the right cheek x 3, left cheek x 2. As physically tender cryosurgery performed. Advised on post op care.   Rosacea on the face  --Start metrogel BID PRN              Follow-up: yearly    1.) Patient was asked about new and changing moles. YES  2.) Patient received a complete physical skin examination: YES  3.) Patient was counseled to perform a monthly self skin examination: YES  Scribed By: Desire Norris MS, PAMIKEL

## 2023-10-31 ENCOUNTER — IMMUNIZATION (OUTPATIENT)
Dept: FAMILY MEDICINE | Facility: CLINIC | Age: 63
End: 2023-10-31
Payer: COMMERCIAL

## 2023-10-31 DIAGNOSIS — Z23 NEED FOR PROPHYLACTIC VACCINATION AND INOCULATION AGAINST INFLUENZA: Primary | ICD-10-CM

## 2023-10-31 DIAGNOSIS — Z23 HIGH PRIORITY FOR 2019-NCOV VACCINE: ICD-10-CM

## 2023-10-31 PROCEDURE — 90480 ADMN SARSCOV2 VAC 1/ONLY CMP: CPT

## 2023-10-31 PROCEDURE — 91320 SARSCV2 VAC 30MCG TRS-SUC IM: CPT

## 2023-10-31 PROCEDURE — 99207 PR NO CHARGE NURSE ONLY: CPT

## 2023-10-31 PROCEDURE — 90471 IMMUNIZATION ADMIN: CPT

## 2023-10-31 PROCEDURE — 90682 RIV4 VACC RECOMBINANT DNA IM: CPT

## 2023-11-14 ENCOUNTER — HOSPITAL ENCOUNTER (EMERGENCY)
Facility: CLINIC | Age: 63
Discharge: HOME | End: 2023-11-14
Attending: PHYSICIAN ASSISTANT | Admitting: PHYSICIAN ASSISTANT
Payer: COMMERCIAL

## 2023-11-14 ENCOUNTER — APPOINTMENT (OUTPATIENT)
Dept: GENERAL RADIOLOGY | Facility: CLINIC | Age: 63
End: 2023-11-14
Attending: PHYSICIAN ASSISTANT
Payer: COMMERCIAL

## 2023-11-14 VITALS
SYSTOLIC BLOOD PRESSURE: 116 MMHG | DIASTOLIC BLOOD PRESSURE: 48 MMHG | OXYGEN SATURATION: 96 % | HEART RATE: 62 BPM | RESPIRATION RATE: 16 BRPM | TEMPERATURE: 97.9 F

## 2023-11-14 DIAGNOSIS — M10.9 GOUT: ICD-10-CM

## 2023-11-14 LAB
BASOPHILS # BLD AUTO: 0.1 10E3/UL (ref 0–0.2)
BASOPHILS NFR BLD AUTO: 1 %
CRP SERPL-MCNC: 14.22 MG/L
EOSINOPHIL # BLD AUTO: 0.1 10E3/UL (ref 0–0.7)
EOSINOPHIL NFR BLD AUTO: 3 %
ERYTHROCYTE [DISTWIDTH] IN BLOOD BY AUTOMATED COUNT: 12.3 % (ref 10–15)
HCT VFR BLD AUTO: 40.6 % (ref 35–47)
HGB BLD-MCNC: 14.3 G/DL (ref 11.7–15.7)
IMM GRANULOCYTES # BLD: 0 10E3/UL
IMM GRANULOCYTES NFR BLD: 0 %
LYMPHOCYTES # BLD AUTO: 2.1 10E3/UL (ref 0.8–5.3)
LYMPHOCYTES NFR BLD AUTO: 39 %
MCH RBC QN AUTO: 31.8 PG (ref 26.5–33)
MCHC RBC AUTO-ENTMCNC: 35.2 G/DL (ref 31.5–36.5)
MCV RBC AUTO: 90 FL (ref 78–100)
MONOCYTES # BLD AUTO: 0.4 10E3/UL (ref 0–1.3)
MONOCYTES NFR BLD AUTO: 7 %
NEUTROPHILS # BLD AUTO: 2.8 10E3/UL (ref 1.6–8.3)
NEUTROPHILS NFR BLD AUTO: 50 %
NRBC # BLD AUTO: 0 10E3/UL
NRBC BLD AUTO-RTO: 0 /100
PLATELET # BLD AUTO: 175 10E3/UL (ref 150–450)
RBC # BLD AUTO: 4.5 10E6/UL (ref 3.8–5.2)
URATE SERPL-MCNC: 8 MG/DL (ref 2.4–5.7)
WBC # BLD AUTO: 5.5 10E3/UL (ref 4–11)

## 2023-11-14 PROCEDURE — 73630 X-RAY EXAM OF FOOT: CPT | Mod: RT

## 2023-11-14 PROCEDURE — 99213 OFFICE O/P EST LOW 20 MIN: CPT | Performed by: PHYSICIAN ASSISTANT

## 2023-11-14 PROCEDURE — 85025 COMPLETE CBC W/AUTO DIFF WBC: CPT | Performed by: PHYSICIAN ASSISTANT

## 2023-11-14 PROCEDURE — 36415 COLL VENOUS BLD VENIPUNCTURE: CPT | Performed by: PHYSICIAN ASSISTANT

## 2023-11-14 PROCEDURE — 84550 ASSAY OF BLOOD/URIC ACID: CPT | Performed by: PHYSICIAN ASSISTANT

## 2023-11-14 PROCEDURE — 86140 C-REACTIVE PROTEIN: CPT | Performed by: PHYSICIAN ASSISTANT

## 2023-11-14 PROCEDURE — G0463 HOSPITAL OUTPT CLINIC VISIT: HCPCS

## 2023-11-14 RX ORDER — INDOMETHACIN 50 MG/1
50 CAPSULE ORAL 2 TIMES DAILY WITH MEALS
Qty: 14 CAPSULE | Refills: 0 | Status: SHIPPED | OUTPATIENT
Start: 2023-11-14 | End: 2024-03-28

## 2023-11-14 ASSESSMENT — ACTIVITIES OF DAILY LIVING (ADL): ADLS_ACUITY_SCORE: 35

## 2023-11-14 NOTE — ED TRIAGE NOTES
Pt presents with right foot pain, swollen for a couple weeks,  Has been spreading no on arch of foot.  Pt declines injury

## 2023-11-14 NOTE — ED PROVIDER NOTES
History   No chief complaint on file.    HPI  Kenya Rubalcava is a 63 year old female who presents to urgent care with concern over right great toe/foot pain worsened present for approximately 2 weeks.  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  She complains of pain, swelling, erythema, warmth to medial aspect of right first MTP joint and metatarsal head.  She denies any distal toe numbness/paresthesias.  No other areas of significant discomfort.    Allergies:  Allergies   Allergen Reactions    Ivp Dye [Contrast Dye] Anaphylaxis and Hives    Cats Anaphylaxis    Niaspan [Niacin] Rash    Pollen Extract/Tree Extract        Problem List:    Patient Active Problem List    Diagnosis Date Noted    Chronic pansinusitis 03/29/2022     Priority: Medium     Added automatically from request for surgery [id number]      Chronic tonsillitis 03/29/2022     Priority: Medium     Added automatically from request for surgery [id number]      Obesity (BMI 35.0-39.9) with comorbidity (H) 07/19/2019     Priority: Medium    Primary insomnia 07/19/2019     Priority: Medium    Anxiety 05/14/2015     Priority: Medium    Meniere disease 11/11/2010     Priority: Medium     Diagnosed 10/10 - active in left ear. Trying dyazide daily, serax twice daily, meclizine qid       Kidney stones 06/15/2009     Priority: Medium     Bilateral noted on CT 5/23/09      Hypertriglyceridemia 06/15/2009     Priority: Medium    Hyperlipidemia LDL goal <130 06/30/2008     Priority: Medium     Recent Labs   Lab Test 11/26/10 0847 7/1/10 0927    CHOL 188 164    HDL 28* 27*    LDL Cannot estimate LDL when triglyceride exceeds 400 mg/dL93 Cannot estimate LDL when triglyceride exceeds 400 mg/dL    TRIG 443* 431*    CHOLHDLRATIO 7.0* 6.0*     11/29/10 - Just added fenofibrate and recheck in 2 months lipid/ast       Psoriasis 05/22/2008  "    Priority: Medium     Less than 1% body surface area - 1% ointment Triamcinalone, scalp Head&Shoulders or Nizoral [place]oot[place] FS prescriptions.      Symptomatic menopausal or female climacteric states 06/25/2007     Priority: Medium    Peripheral vertigo 09/06/2006     Priority: Medium     Problem list name updated by automated process. Provider to review      Essential hypertension, benign 05/02/2006     Priority: Medium    Anxiety disorder due to medical condition      Priority: Medium     Treatment tried:  Buspar 7.5 mg bid (stopped 03/21/2005), Celexa started 03/21/2005    Problem list name updated by automated process. Provider to review      Allergic rhinitis 06/16/2004     Priority: Medium     Environmental allergies  Problem list name updated by automated process. Provider to review      Sensorineural hearing loss 07/05/2002     Priority: Medium     Metal \"Esteem\" implant in the right posterior head  - placed approximately 2011    Referred to Dr Street, ENT Yefri Honeycutt by previous clinic  Problem list name updated by automated process. Provider to review            Past Medical History:    Past Medical History:   Diagnosis Date    Abnormal Papanicolaou smear of cervix and cervical HPV 1986    Anxiety     Anxiety disorder in conditions classified elsewhere     Calculus of kidney 08/11/1988    Calculus of ureter 06/30/1985    Excessive or frequent menstruation 07/07/2003    Labyrinthitis, unspecified     Lump or mass in breast 10/27/1999    Variants of migraine, not elsewhere classified, without mention of intractable migraine without mention of status migrainosus 02/07/2002       Past Surgical History:    Past Surgical History:   Procedure Laterality Date    CARPAL TUNNEL RELEASE RT/LT  08/01/2008    Left     COLONOSCOPY  08/07/2012    Procedure: COLONOSCOPY;  Colonoscopy;  Surgeon: Kailyn Lopez MD;  Location: [facility]    COLONOSCOPY N/A 09/21/2022    Procedure: COLONOSCOPY;  Surgeon: " Denton Montilla, DO;  Location: [facility]    CYSTOSCOPY, RETROGRADES, INSERT STENT URETER(S), COMBINED Left 08/19/2016    Procedure: COMBINED CYSTOSCOPY, RETROGRADES, INSERT STENT URETER(S);  Surgeon: Terence Carpenter MD;  [facility]    esteem implant  06/23/2011    Hearing implant right ear.    HYSTERECTOMY, PAP NO LONGER INDICATED  12/01/2004    Vaginal hysterectomy    LAPAROSCOPIC ABLATION ENDOMETRIOSIS  07/01/2004    LASER HOLMIUM LITHOTRIPSY URETER(S), INSERT STENT, COMBINED Left 09/12/2016    Procedure: COMBINED CYSTOSCOPY, URETEROSCOPY, LASER HOLMIUM LITHOTRIPSY URETER(S), INSERT STENT;  Surgeon: Kailyn Dozier MD;  Location:  OR    LEFT wrist surgery      OPTICAL TRACKING SYSTEM ENDOSCOPIC SINUS SURGERY Bilateral 05/12/2022    Procedure: IMAGE GUIDED FUNCTIONAL BILATERAL  ENDOSCOPIC SINUS SURGERY (FESS) WITHOUT SEPTOPLASTY;  Surgeon: Dane Lowery MD;  Location: [facility]    Sinus surgery with repair of deviated septum      TONSILLECTOMY, ADENOIDECTOMY ADULT, COMBINED Bilateral 05/12/2022    Procedure: BILATERAL TONSILLECTOMY;  Surgeon: Dane Lowery MD;  Location: [facility]    TUBAL LIGATION         Family History:    Family History   Problem Relation Age of Onset    Heart Disease Brother     Hypertension Brother     Asthma Daughter     Heart Disease Brother     Hypertension Brother     C.A.D. Father         MI     Hypertension Father     Lipids Father 50    C.A.D. Paternal Aunt         MI-50s    Cancer Maternal Aunt         Ovarian CA    Cerebrovascular Disease No family hx of     Breast Cancer No family hx of     Cancer - colorectal No family hx of     Prostate Cancer No family hx of        Social History:  Marital Status:  Single [1]  Social History     Tobacco Use    Smoking status: Never    Smokeless tobacco: Never   Substance Use Topics    Alcohol use: Yes     Alcohol/week: 0.0 standard drinks of alcohol     Comment: rare     Drug use: No        Medications:     ASPIRIN 325 MG OR TBEC  atenolol (TENORMIN) 50 MG tablet  betamethasone dipropionate (DIPROSONE) 0.05 % external ointment  budesonide (PULMICORT) 0.5 MG/2ML neb solution  diazepam (VALIUM) 5 MG tablet  DIPHENHYDRAMINE HCL PO  fluocinonide (LIDEX) 0.05 % external solution  fluticasone (FLONASE) 50 MCG/ACT nasal spray  hydrOXYzine (VISTARIL) 50 MG capsule  levocetirizine (XYZAL) 5 MG tablet  meclizine (ANTIVERT) 25 MG tablet  metroNIDAZOLE (METROGEL) 0.75 % external gel  pravastatin (PRAVACHOL) 40 MG tablet  prochlorperazine (COMPAZINE) 10 MG tablet  traZODone (DESYREL) 100 MG tablet  triamterene-HCTZ (DYAZIDE) 37.5-25 MG capsule  venlafaxine (EFFEXOR XR) 150 MG 24 hr capsule      Review of Systems  CONSTITUTIONAL:NEGATIVE for fever, chills, change in weight  INTEGUMENTARY/SKIN: POSITIVE for erythema at site of right foot pain   RESP:NEGATIVE for significant cough or SOB  MUSCULOSKELETAL: POSITIVE  for right great toe/foot pain and NEGATIVE for other concerning arthralgias or myalgias   NEURO: NEGATIVE for numbness, paresthesias.    Physical Exam   BP: 116/48  Pulse: 62  Temp: 97.9  F (36.6  C)  Resp: 16  SpO2: 96 %  Physical Exam  Constitutional:       General: She is not in acute distress.     Appearance: She is not ill-appearing or toxic-appearing.   Cardiovascular:      Pulses:           Dorsalis pedis pulses are 2+ on the right side.        Posterior tibial pulses are 2+ on the right side.   Musculoskeletal:      Right ankle: Normal.      Right foot: Decreased range of motion. Normal capillary refill. Swelling (localized to first MTP joint) and tenderness present. No deformity, bunion, laceration or crepitus. Normal pulse.   Skin:     General: Skin is warm and dry.      Findings: Erythema (right first MTP joint) present. No abrasion, ecchymosis, laceration or rash.   Neurological:      Mental Status: She is alert.      Sensory: No sensory deficit.       ED Course           Procedures       Critical Care time:   none          Results for orders placed or performed during the hospital encounter of 11/14/23   Foot  XR, G/E 3 views, right     Status: None    Narrative    RIGHT FOOT THREE OR MORE VIEWS  11/14/2023 1:19 PM     INDICATION: Right foot pain. Symptoms are located in the region of the  first MTP joint.     COMPARISON: None.      Impression    IMPRESSION:  1.  Mild right first MTP degenerative arthrosis.  2.  Scattered mild degenerative changes in the toe IP joints.  3.  Moderate-sized calcaneus enthesophytes.  4.  No fracture.    CHIO RICO MD         SYSTEM ID:  [id number]   CRP Inflammation     Status: Abnormal   Result Value Ref Range    CRP Inflammation 14.22 (H) <5.00 mg/L   Uric acid     Status: Abnormal   Result Value Ref Range    Uric Acid 8.0 (H) 2.4 - 5.7 mg/dL   CBC with platelets and differential     Status: None   Result Value Ref Range    WBC Count 5.5 4.0 - 11.0 10e3/uL    RBC Count 4.50 3.80 - 5.20 10e6/uL    Hemoglobin 14.3 11.7 - 15.7 g/dL    Hematocrit 40.6 35.0 - 47.0 %    MCV 90 78 - 100 fL    MCH 31.8 26.5 - 33.0 pg    MCHC 35.2 31.5 - 36.5 g/dL    RDW 12.3 10.0 - 15.0 %    Platelet Count 175 150 - 450 10e3/uL    % Neutrophils 50 %    % Lymphocytes 39 %    % Monocytes 7 %    % Eosinophils 3 %    % Basophils 1 %    % Immature Granulocytes 0 %    NRBCs per 100 WBC 0 <1 /100    Absolute Neutrophils 2.8 1.6 - 8.3 10e3/uL    Absolute Lymphocytes 2.1 0.8 - 5.3 10e3/uL    Absolute Monocytes 0.4 0.0 - 1.3 10e3/uL    Absolute Eosinophils 0.1 0.0 - 0.7 10e3/uL    Absolute Basophils 0.1 0.0 - 0.2 10e3/uL    Absolute Immature Granulocytes 0.0 <=0.4 10e3/uL    Absolute NRBCs 0.0 10e3/uL   CBC with platelets, differential     Status: None    Narrative    The following orders were created for panel order CBC with platelets, differential.  Procedure                               Abnormality         Status                     ---------                               -----------         ------                      CBC with platelets and d...[[id number]]                      Final result                 Please view results for these tests on the individual orders.         Medications - No data to display    Assessments & Plan (with Medical Decision Making)     I have reviewed the nursing notes.    I have reviewed the findings, diagnosis, plan and need for follow up with the patient.       Discharge Medication List as of 11/14/2023  2:24 PM        START taking these medications    Details   indomethacin (INDOCIN) 50 MG capsule Take 1 capsule (50 mg) by mouth 2 times daily (with meals) for 7 days, Disp-14 capsule, R-0, E-Prescribe           Final diagnoses:   Gout     63-year-old female presents to urgent care with concern over right great toe/foot pain present present for approximately last 2 weeks.  She had stable vital signs upon arrival.  Physical exam findings significant for erythema, swelling, tenderness palpation mild warmth at the first MTP joint.  She had x-ray which is negative for acute bony abnormality.  Laboratory testing included noncontributory CBC.  CRP was elevated at 14.22 and uric acid was elevated at 8.  The history and physical exam is most consistent with gout.  I do not suspect cellulitis, septic arthritis, bunion.  She was discharged home with prescription for Indocin.  Follow-up with primary care provider if no improvement within the next 3 days.  Worrisome reasons to return to the ER/UC sooner discussed.    Disclaimer: This note consists of symbols derived from keyboarding, dictation, and/or voice recognition software. As a result, there may be errors in the script that have gone undetected.  Please consider this when interpreting information found in the chart.      11/14/2023   [facility] EMERGENCY DEPT       Dolores Gerber PA-C  11/17/23 1959

## 2023-11-15 NOTE — RESULT ENCOUNTER NOTE
Diagnosed with Gout and treated with Indomethacin  Advised by [facility] Provider to followup with PCP, Dr Real within 5 days

## 2023-12-05 DIAGNOSIS — F06.4 ANXIETY DISORDER DUE TO MEDICAL CONDITION: ICD-10-CM

## 2023-12-05 DIAGNOSIS — F41.9 ANXIETY: ICD-10-CM

## 2023-12-05 DIAGNOSIS — F51.01 PRIMARY INSOMNIA: ICD-10-CM

## 2023-12-12 RX ORDER — TRAZODONE HYDROCHLORIDE 100 MG/1
TABLET ORAL
Qty: 180 TABLET | Refills: 0 | Status: SHIPPED | OUTPATIENT
Start: 2023-12-12 | End: 2024-03-03

## 2023-12-12 RX ORDER — DIAZEPAM 5 MG
TABLET ORAL
Qty: 45 TABLET | Refills: 0 | Status: SHIPPED | OUTPATIENT
Start: 2023-12-12 | End: 2023-12-21

## 2023-12-12 NOTE — TELEPHONE ENCOUNTER
Please call patient. They are due for an office visit /follow up and possibly labs.  Thank you . Carlie Real M.D.

## 2023-12-19 DIAGNOSIS — F41.9 ANXIETY: ICD-10-CM

## 2023-12-20 RX ORDER — VENLAFAXINE HYDROCHLORIDE 150 MG/1
CAPSULE, EXTENDED RELEASE ORAL
Qty: 90 CAPSULE | Refills: 0 | OUTPATIENT
Start: 2023-12-20

## 2023-12-21 ENCOUNTER — OFFICE VISIT (OUTPATIENT)
Dept: FAMILY MEDICINE | Facility: CLINIC | Age: 63
End: 2023-12-21
Payer: COMMERCIAL

## 2023-12-21 DIAGNOSIS — F06.4 ANXIETY DISORDER DUE TO MEDICAL CONDITION: ICD-10-CM

## 2023-12-21 DIAGNOSIS — E78.1 HYPERTRIGLYCERIDEMIA: Primary | ICD-10-CM

## 2023-12-21 DIAGNOSIS — I10 ESSENTIAL HYPERTENSION, BENIGN: ICD-10-CM

## 2023-12-21 DIAGNOSIS — E66.812 CLASS 2 SEVERE OBESITY DUE TO EXCESS CALORIES WITH SERIOUS COMORBIDITY AND BODY MASS INDEX (BMI) OF 36.0 TO 36.9 IN ADULT (H): ICD-10-CM

## 2023-12-21 DIAGNOSIS — E78.5 HYPERLIPIDEMIA LDL GOAL <130: ICD-10-CM

## 2023-12-21 DIAGNOSIS — M1A.0710 CHRONIC IDIOPATHIC GOUT INVOLVING TOE OF RIGHT FOOT WITHOUT TOPHUS: ICD-10-CM

## 2023-12-21 DIAGNOSIS — E66.01 CLASS 2 SEVERE OBESITY DUE TO EXCESS CALORIES WITH SERIOUS COMORBIDITY AND BODY MASS INDEX (BMI) OF 36.0 TO 36.9 IN ADULT (H): ICD-10-CM

## 2023-12-21 DIAGNOSIS — F41.9 ANXIETY: ICD-10-CM

## 2023-12-21 PROCEDURE — 99214 OFFICE O/P EST MOD 30 MIN: CPT | Performed by: FAMILY MEDICINE

## 2023-12-21 RX ORDER — ALLOPURINOL 300 MG/1
300 TABLET ORAL DAILY
Qty: 90 TABLET | Refills: 1 | Status: SHIPPED | OUTPATIENT
Start: 2023-12-21 | End: 2024-05-08

## 2023-12-21 RX ORDER — DIAZEPAM 5 MG
TABLET ORAL
Qty: 45 TABLET | Refills: 5 | Status: SHIPPED | OUTPATIENT
Start: 2023-12-21 | End: 2024-04-24

## 2023-12-21 RX ORDER — TRIAMTERENE AND HYDROCHLOROTHIAZIDE 37.5; 25 MG/1; MG/1
CAPSULE ORAL
Qty: 90 CAPSULE | Refills: 3 | Status: SHIPPED | OUTPATIENT
Start: 2023-12-21 | End: 2024-01-31

## 2023-12-21 RX ORDER — VENLAFAXINE HYDROCHLORIDE 150 MG/1
CAPSULE, EXTENDED RELEASE ORAL
Qty: 90 CAPSULE | Refills: 3 | Status: SHIPPED | OUTPATIENT
Start: 2023-12-21 | End: 2024-03-03

## 2023-12-21 RX ORDER — ALLOPURINOL 100 MG/1
100 TABLET ORAL DAILY
Qty: 90 TABLET | Refills: 0 | Status: SHIPPED | OUTPATIENT
Start: 2023-12-21

## 2023-12-21 RX ORDER — PRAVASTATIN SODIUM 40 MG
TABLET ORAL
Qty: 90 TABLET | Refills: 3 | Status: SHIPPED | OUTPATIENT
Start: 2023-12-21 | End: 2024-03-03

## 2023-12-21 ASSESSMENT — PATIENT HEALTH QUESTIONNAIRE - PHQ9: SUM OF ALL RESPONSES TO PHQ QUESTIONS 1-9: 8

## 2023-12-21 ASSESSMENT — ANXIETY QUESTIONNAIRES
IF YOU CHECKED OFF ANY PROBLEMS ON THIS QUESTIONNAIRE, HOW DIFFICULT HAVE THESE PROBLEMS MADE IT FOR YOU TO DO YOUR WORK, TAKE CARE OF THINGS AT HOME, OR GET ALONG WITH OTHER PEOPLE: SOMEWHAT DIFFICULT
1. FEELING NERVOUS, ANXIOUS, OR ON EDGE: MORE THAN HALF THE DAYS
GAD7 TOTAL SCORE: 10
7. FEELING AFRAID AS IF SOMETHING AWFUL MIGHT HAPPEN: SEVERAL DAYS
6. BECOMING EASILY ANNOYED OR IRRITABLE: NOT AT ALL
3. WORRYING TOO MUCH ABOUT DIFFERENT THINGS: MORE THAN HALF THE DAYS
2. NOT BEING ABLE TO STOP OR CONTROL WORRYING: NEARLY EVERY DAY
4. TROUBLE RELAXING: SEVERAL DAYS
GAD7 TOTAL SCORE: 10
5. BEING SO RESTLESS THAT IT IS HARD TO SIT STILL: SEVERAL DAYS

## 2023-12-21 NOTE — COMMUNITY RESOURCES LIST (ENGLISH)
12/21/2023   [facility] - Outpatient Clinics  N/A  For additional resource needs, please contact your health insurance member services or your primary care team.  Phone: [phone]   Email: N/A   Address: [address] [place] [address]   Hours: N/A        Financial Stability       Utility payment assistance  1  [place] [employer] Commission - [place]'s Telephone Assistance Plan (TAP) and Federal Lifeline and Affordable Connectivity Program (ACP) Distance: 12.85 miles      Phone/Virtual   [address] [place] [address]  Language: English  Fees: Free   Phone: (154) 279-7334 Email: consumer.puc@[facility].mn. Website: https://[place].gov/puc/consumers/telephone/     2  [place] [employer] - Energy and Utilities Distance: 14.69 miles      In-Person, Phone/Virtual   [address] [place] [address]  Language: English  Hours: Mon - Fri 8:30 AM - 4:30 PM  Fees: Free   Phone: (140) 441-6614 Website: https://[place]/LineMetricse/energy/consumer-assistance/energy-assistance-program/          Important Numbers & Websites       [facility]   211 211itedway.org  Poison Control   (876) 852-8773 [place]poison.[place]  Suicide and Crisis Lifeline   [address] [employer].org  Childhelp [place] Child Abuse Hotline   648.402.3757 Childhelphotline.org  National Sexual Assault Hotline   (510) 697-2530 (HOPE) Rainn.org  National Runaway Safeline   (670) 810-8343 (RUNAWAY) 1800runaway.org  Pregnancy & Postpartum Support [place]   Call/text 251-019-0265 Ppsupportmn.org  Substance Abuse National Helpline ([facility]   480-705-HELP (8609) Findtreatment.gov  Emergency Services   911

## 2023-12-21 NOTE — COMMUNITY RESOURCES LIST (ENGLISH)
12/21/2023   [facility]  N/A  For questions about this resource list or additional care needs, please contact your primary care clinic or care manager.  Phone: [phone]   Email: N/A   Address: [address] [place] [address]   Hours: N/A        Financial Stability       Utility payment assistance  1  [facility] Distance: 5.99 miles      In-Person, Phone/Virtual   [address] [place] [address]  Language: English  Hours: Mon - Fri 8:00 AM - 4:30 PM  Fees: Free   Phone: (210) 349-4146 Email: karlos@[facility]. Website: https://www.[facility]./Facilities/Facility/Details/23     2  [employer] Distance: 8.29 miles      In-Person, Phone/Virtual   [address] [place] [address]  Language: English  Hours: Mon - Sun Appt. Only  Fees: Free   Phone: (877) 609-4938 Email: janee@[employer] Website: http://www.communityhelpinghand.org          Important Numbers & Websites       Emergency Services   911  City Services   311  Poison Control   (609) 908-4982  Suicide Prevention Lifeline   (394) 599-5463 (TALK)  Child Abuse Hotline   (444) 860-7146 (4-A-Child)  Sexual Assault Hotline   (411) 357-7451 (HOPE)  National Runaway Safeline   (460) 498-5134 (RUNAWAY)  All-Options Talkline   (443) 155-5797  Substance Abuse Referral   (321) 165-8996 (HELP)

## 2023-12-21 NOTE — PROGRESS NOTES
"  Assessment & Plan     Hypertriglyceridemia  Recheck   - ALT; Future    Essential hypertension, benign  Recheck improved will have her get a home blood pressure cuff   - BASIC METABOLIC PANEL; Future  - triamterene-HCTZ (DYAZIDE) 37.5-25 MG capsule; TAKE 1 CAPSULE BY MOUTH ONCE EVERY MORNING Strength: 37.5-25 mg  - Home Blood Pressure Monitor Order for DME - ONLY FOR DME    Anxiety  Cotn   - venlafaxine (EFFEXOR XR) 150 MG 24 hr capsule; TAKE 1 CAPSULE (150 MG) BY MOUTH ONCE DAILY Strength: 150 mg  - diazepam (VALIUM) 5 MG tablet; TAKE 1 TABLET BY MOUTH EVERY 12 HOURS AS NEEDED FOR ANXIETY OR SLEEP.    Anxiety disorder due to medical condition    - diazepam (VALIUM) 5 MG tablet; TAKE 1 TABLET BY MOUTH EVERY 12 HOURS AS NEEDED FOR ANXIETY OR SLEEP.    Hyperlipidemia LDL goal <130  Revfills   - pravastatin (PRAVACHOL) 40 MG tablet; TAKE 1 TABLET BY MOUTH nightly AT BEDTIMETAKE 1 TABLET BY MOUTH nightly AT BEDTIME Strength: 40 mg    Chronic idiopathic gout involving toe of right foot without tophus  Will have her start this and  - allopurinol (ZYLOPRIM) 100 MG tablet; Take 1 tablet (100 mg) by mouth daily For 30 days then increase to 2 daily for 30 day  - allopurinol (ZYLOPRIM) 300 MG tablet; Take 1 tablet (300 mg) by mouth daily After tapering up  - Uric acid; Future    Class 2 severe obesity due to excess calories with serious comorbidity and body mass index (BMI) of 36.0 to 36.9 in adult (H)  Working on this       BMI:   Estimated body mass index is 36.22 kg/m  as calculated from the following:    Height as of this encounter: 1.626 m (5' 4\").    Weight as of this encounter: 95.7 kg (211 lb).       [facility] home blood pressure [facility]  Recheck uric acid in 3 months     Carlie Real MD  [facility] OSMIN Cline is a 63 year old, presenting for the following health issues:  Recheck Medication        12/21/2023     4:42 PM   Additional Questions   Roomed by Shyann RUEDA CMA " "      History of Present Illness       Mental Health Follow-up:  Patient presents to follow-up on Anxiety.    Patient's anxiety since last visit has been:  Worse  The patient is having other symptoms associated with anxiety.  Any significant life events: grief or loss  Patient is feeling anxious or having panic attacks.  Patient has no concerns about alcohol or drug use.    She eats 2-3 servings of fruits and vegetables daily.She consumes 0 sweetened beverage(s) daily.She exercises with enough effort to increase her heart rate 9 or less minutes per day.  She exercises with enough effort to increase her heart rate 3 or less days per week.   She is taking medications regularly.         Anxiety Follow-Up    Social History     Tobacco Use    Smoking status: Never    Smokeless tobacco: Never   Substance Use Topics    Alcohol use: Yes     Alcohol/week: 0.0 standard drinks of alcohol     Comment: rare     Drug use: No         7/29/2020     1:33 PM 3/7/2022     2:39 PM 12/21/2023     8:04 AM   PHQ   PHQ-9 Total Score 3 5 8   Q9: Thoughts of better off dead/self-harm past 2 weeks Not at all Not at all Not at all         7/29/2020     1:33 PM 3/7/2022     2:39 PM 12/21/2023     8:04 AM   ELIZA-7 SCORE   Total Score   10 (moderate anxiety)   Total Score 4 5 10                     Review of Systems   Constitutional, HEENT, cardiovascular, pulmonary, gi and gu systems are negative, except as otherwise noted.      Objective    /88   Pulse 60   Temp 98.7  F (37.1  C) (Tympanic)   Ht 1.626 m (5' 4\")   Wt 95.7 kg (211 lb)   SpO2 97%   BMI 36.22 kg/m    Body mass index is 36.22 kg/m .  Physical Exam   GENERAL: healthy, alert and no distress  NEURO: Normal strength and tone, mentation intact and speech normal  PSYCH: mentation appears normal, affect normal/bright    No results found for any visits on 12/21/23.    Carlie Real M.D.              "

## 2023-12-25 VITALS
TEMPERATURE: 98.7 F | OXYGEN SATURATION: 97 % | HEIGHT: 64 IN | WEIGHT: 211 LBS | HEART RATE: 60 BPM | SYSTOLIC BLOOD PRESSURE: 138 MMHG | BODY MASS INDEX: 36.02 KG/M2 | DIASTOLIC BLOOD PRESSURE: 88 MMHG

## 2024-01-08 ENCOUNTER — PATIENT OUTREACH (OUTPATIENT)
Dept: CARE COORDINATION | Facility: CLINIC | Age: 64
End: 2024-01-08
Payer: COMMERCIAL

## 2024-01-16 ENCOUNTER — PATIENT OUTREACH (OUTPATIENT)
Dept: CARE COORDINATION | Facility: CLINIC | Age: 64
End: 2024-01-16
Payer: COMMERCIAL

## 2024-01-22 ENCOUNTER — PATIENT OUTREACH (OUTPATIENT)
Dept: CARE COORDINATION | Facility: CLINIC | Age: 64
End: 2024-01-22
Payer: COMMERCIAL

## 2024-01-31 ENCOUNTER — MYC REFILL (OUTPATIENT)
Dept: FAMILY MEDICINE | Facility: CLINIC | Age: 64
End: 2024-01-31
Payer: COMMERCIAL

## 2024-01-31 DIAGNOSIS — I10 ESSENTIAL HYPERTENSION, BENIGN: ICD-10-CM

## 2024-02-01 RX ORDER — TRIAMTERENE AND HYDROCHLOROTHIAZIDE 37.5; 25 MG/1; MG/1
CAPSULE ORAL
Qty: 90 CAPSULE | Refills: 2 | Status: SHIPPED | OUTPATIENT
Start: 2024-02-01 | End: 2024-05-08

## 2024-02-05 ENCOUNTER — TELEPHONE (OUTPATIENT)
Dept: FAMILY MEDICINE | Facility: CLINIC | Age: 64
End: 2024-02-05
Payer: COMMERCIAL

## 2024-02-05 DIAGNOSIS — M1A.0710 CHRONIC IDIOPATHIC GOUT INVOLVING TOE OF RIGHT FOOT WITHOUT TOPHUS: Primary | ICD-10-CM

## 2024-02-05 RX ORDER — PREDNISONE 20 MG/1
TABLET ORAL
Qty: 9 TABLET | Refills: 1 | Status: SHIPPED | OUTPATIENT
Start: 2024-02-05 | End: 2024-03-04

## 2024-02-05 NOTE — TELEPHONE ENCOUNTER
Patient returned call   Relayed Dr. Real's message    Patient verbalized understanding  No further questions/concerns    Palomo Martinez, Clinic RN  [facility]

## 2024-02-05 NOTE — TELEPHONE ENCOUNTER
Call placed to patient   No answer; voicemail left requesting call back to Clinic RN at [phone]    Palomo Martinez, Clinic RN  [facility]

## 2024-02-05 NOTE — TELEPHONE ENCOUNTER
Stay with the 100mg dose buthold it for the next 5 days . I will send in prednisone and then she can restart at the 100mg for another month. Then increase to 200mg daily . Carlie Real M.D.

## 2024-02-05 NOTE — TELEPHONE ENCOUNTER
"Patient's call transferred to author    Patient states she had a visit with Dr. Real last month and was started on Allopurinol for Gout  Patient had taken 100mg of the medication for a month and last week increased to 200mg     Patient was advised by Dr. Real to slowly taper as she may experience \"relapse of symptoms\" if she tapers too quickly     Patient started to have gout symptoms in her right big toe this weekend   Reporting increased pain - 8/10 on the pain scale  Has taken OTC Ibuprofen with very mild improvement   Reporting swelling, redness, and warmth to toe joint     Patient wondering if she needs to taper down to the 100mg or if a new medication is needed for symptoms    Palomo Martinez, Clinic RN  [facility]     "

## 2024-02-13 ENCOUNTER — PATIENT OUTREACH (OUTPATIENT)
Dept: CARE COORDINATION | Facility: CLINIC | Age: 64
End: 2024-02-13
Payer: COMMERCIAL

## 2024-02-16 NOTE — PATIENT INSTRUCTIONS

## 2024-02-24 ENCOUNTER — APPOINTMENT (OUTPATIENT)
Dept: GENERAL RADIOLOGY | Facility: CLINIC | Age: 64
End: 2024-02-24
Attending: PHYSICIAN ASSISTANT
Payer: COMMERCIAL

## 2024-02-24 ENCOUNTER — NURSE TRIAGE (OUTPATIENT)
Dept: NURSING | Facility: CLINIC | Age: 64
End: 2024-02-24
Payer: COMMERCIAL

## 2024-02-24 ENCOUNTER — HOSPITAL ENCOUNTER (EMERGENCY)
Facility: CLINIC | Age: 64
Discharge: HOME | End: 2024-02-24
Attending: PHYSICIAN ASSISTANT | Admitting: PHYSICIAN ASSISTANT
Payer: COMMERCIAL

## 2024-02-24 VITALS
DIASTOLIC BLOOD PRESSURE: 45 MMHG | SYSTOLIC BLOOD PRESSURE: 109 MMHG | RESPIRATION RATE: 17 BRPM | TEMPERATURE: 98 F | OXYGEN SATURATION: 96 % | HEART RATE: 65 BPM

## 2024-02-24 DIAGNOSIS — M79.671 RIGHT FOOT PAIN: ICD-10-CM

## 2024-02-24 PROCEDURE — G0463 HOSPITAL OUTPT CLINIC VISIT: HCPCS

## 2024-02-24 PROCEDURE — 73630 X-RAY EXAM OF FOOT: CPT | Mod: RT

## 2024-02-24 PROCEDURE — 99213 OFFICE O/P EST LOW 20 MIN: CPT | Performed by: PHYSICIAN ASSISTANT

## 2024-02-24 RX ORDER — INDOMETHACIN 50 MG/1
50 CAPSULE ORAL 2 TIMES DAILY WITH MEALS
Qty: 20 CAPSULE | Refills: 0 | Status: SHIPPED | OUTPATIENT
Start: 2024-02-24

## 2024-02-24 ASSESSMENT — ACTIVITIES OF DAILY LIVING (ADL): ADLS_ACUITY_SCORE: 35

## 2024-02-24 ASSESSMENT — COLUMBIA-SUICIDE SEVERITY RATING SCALE - C-SSRS
6. HAVE YOU EVER DONE ANYTHING, STARTED TO DO ANYTHING, OR PREPARED TO DO ANYTHING TO END YOUR LIFE?: NO
2. HAVE YOU ACTUALLY HAD ANY THOUGHTS OF KILLING YOURSELF IN THE PAST MONTH?: NO
1. IN THE PAST MONTH, HAVE YOU WISHED YOU WERE DEAD OR WISHED YOU COULD GO TO SLEEP AND NOT WAKE UP?: NO

## 2024-02-24 NOTE — TELEPHONE ENCOUNTER
"Nurse Triage SBAR    Is this a 2nd Level Triage? NO    Situation:   Patient calling reporting flare up of gout today in right foot.    Background:   Patient recently completed prednisone and restarted allopurinol for gout     Assessment:    Symptoms starting 1 month ago with \"gout\" in right toe.  Reporting it has now \"flared\" into entire right foot.  Mild swelling. Denies redness. Afebrile.  Patient has been taking Ibuprofen 600 mg with no improvement in pain.  Difficulty sleeping last night.  Rating pain \"9\" on 1-10 pain scale. Difficulty ambulating due to pain.    Protocol Recommended Disposition:   See provider with in 4 hours or PCP triage. Patient agrees to go to [facility] now.      Reason for Disposition   [1] SEVERE pain (e.g., excruciating, unable to do any normal activities) AND [2] not improved after 2 hours of pain medicine    Additional Information   Negative: Entire foot is cool or blue in comparison to other foot   Negative: Purple or black skin on foot or toe   Negative: [1] Red area or streak AND [2] fever   Negative: [1] Swollen foot AND [2] fever   Negative: Patient sounds very sick or weak to the triager   Negative: Followed a foot injury   Negative: Diabetes mellitus   Negative: Toe pain is main symptom   Negative: Ankle pain is main symptom   Negative: Thigh or calf pain is main symptom    Protocols used: Foot Pain-A-AH    "

## 2024-02-24 NOTE — ED TRIAGE NOTES
Concern for gout of right for the past month and 1/2. Have been treated for the toe and now its the whole foot.

## 2024-02-24 NOTE — ED PROVIDER NOTES
"  History   No chief complaint on file.    HPI  Kenya Rubalcava is a 63 year old female who presents history significant for hypertension, hyperlipidemia, psoriasis, Ménière's disease, obesity, gout who presents to the urgent care with concern over right foot pain.  Patient reports that she had symptoms for at least the last 6 weeks.  She was evaluated initially diagnosed with gout treated with Indocin with improvement of her symptoms.  However states that symptoms began recurring she was seen by her primary care provider who initiated prednisone, allopurinol.  She was attempting to taper her allopurinol though she had another \"gout flare\" and was given a another steroid burst.  She finished steroids approximately 10 days ago is currently taking 100 mg indocin daily.  She denies any new onset fever, chills, myalgias, cough, dyspnea, wheezing, vomiting, diarrhea, abdominal pain     Allergies:  Allergies   Allergen Reactions    Ivp Dye [Contrast Dye] Anaphylaxis and Hives    Cats Anaphylaxis    Niaspan [Niacin] Rash    Pollen Extract/Tree Extract        Problem List:    Patient Active Problem List    Diagnosis Date Noted    Class 2 severe obesity due to excess calories with serious comorbidity in adult (H) 12/21/2023     Priority: Medium    Chronic pansinusitis 03/29/2022     Priority: Medium     Added automatically from request for surgery [id number]      Chronic tonsillitis 03/29/2022     Priority: Medium     Added automatically from request for surgery [id number]      Primary insomnia 07/19/2019     Priority: Medium    Anxiety 05/14/2015     Priority: Medium    Meniere disease 11/11/2010     Priority: Medium     Diagnosed 10/10 - active in left ear. Trying dyazide daily, serax twice daily, meclizine qid       Kidney stones 06/15/2009     Priority: Medium     Bilateral noted on CT 5/23/09      Hypertriglyceridemia 06/15/2009     Priority: Medium    Hyperlipidemia LDL goal <130 06/30/2008     Priority: Medium     Recent " "Labs   Lab Test 11/26/10 0847 7/1/10 0927    CHOL 188 164    HDL 28* 27*    LDL Cannot estimate LDL when triglyceride exceeds 400 mg/dL93 Cannot estimate LDL when triglyceride exceeds 400 mg/dL    TRIG 443* 431*    CHOLHDLRATIO 7.0* 6.0*     11/29/10 - Just added fenofibrate and recheck in 2 months lipid/ast       Psoriasis 05/22/2008     Priority: Medium     Less than 1% body surface area - 1% ointment Triamcinalone, scalp Head&Shoulders or Nizoral Maringouin-Smoothe FS prescriptions.      Symptomatic menopausal or female climacteric states 06/25/2007     Priority: Medium    Peripheral vertigo 09/06/2006     Priority: Medium     Problem list name updated by automated process. Provider to review      Essential hypertension, benign 05/02/2006     Priority: Medium    Anxiety disorder due to medical condition      Priority: Medium     Treatment tried:  Buspar 7.5 mg bid (stopped 03/21/2005), Celexa started 03/21/2005    Problem list name updated by automated process. Provider to review      Allergic rhinitis 06/16/2004     Priority: Medium     Environmental allergies  Problem list name updated by automated process. Provider to review      Sensorineural hearing loss 07/05/2002     Priority: Medium     Metal \"Esteem\" implant in the right posterior head  - placed approximately 2011    Referred to Dr Street, [facility] by previous clinic  Problem list name updated by automated process. Provider to review            Past Medical History:    Past Medical History:   Diagnosis Date    Abnormal Papanicolaou smear of cervix and cervical HPV 1986    Anxiety     Anxiety disorder in conditions classified elsewhere     Calculus of kidney 08/11/1988    Calculus of ureter 06/30/1985    Excessive or frequent menstruation 07/07/2003    Labyrinthitis, unspecified     Lump or mass in breast 10/27/1999    Variants of migraine, not elsewhere classified, without mention of intractable migraine without mention of status migrainosus " 02/07/2002       Past Surgical History:    Past Surgical History:   Procedure Laterality Date    CARPAL TUNNEL RELEASE RT/LT  08/01/2008    Left     COLONOSCOPY  08/07/2012    Procedure: COLONOSCOPY;  Colonoscopy;  Surgeon: Kailyn Lopez MD;  Location: [facility]    COLONOSCOPY N/A 09/21/2022    Procedure: COLONOSCOPY;  Surgeon: Denton Montilla DO;  Location: [facility]    CYSTOSCOPY, RETROGRADES, INSERT STENT URETER(S), COMBINED Left 08/19/2016    Procedure: COMBINED CYSTOSCOPY, RETROGRADES, INSERT STENT URETER(S);  Surgeon: Terence Carpenter MD;  [facility]    esteem implant  06/23/2011    Hearing implant right ear.    HYSTERECTOMY, PAP NO LONGER INDICATED  12/01/2004    Vaginal hysterectomy    LAPAROSCOPIC ABLATION ENDOMETRIOSIS  07/01/2004    LASER HOLMIUM LITHOTRIPSY URETER(S), INSERT STENT, COMBINED Left 09/12/2016    Procedure: COMBINED CYSTOSCOPY, URETEROSCOPY, LASER HOLMIUM LITHOTRIPSY URETER(S), INSERT STENT;  Surgeon: Kailyn Dozier MD;  [facility]    LEFT wrist surgery      OPTICAL TRACKING SYSTEM ENDOSCOPIC SINUS SURGERY Bilateral 05/12/2022    Procedure: IMAGE GUIDED FUNCTIONAL BILATERAL  ENDOSCOPIC SINUS SURGERY (FESS) WITHOUT SEPTOPLASTY;  Surgeon: Dane Lowery MD;  Location: [facility]    Sinus surgery with repair of deviated septum      TONSILLECTOMY, ADENOIDECTOMY ADULT, COMBINED Bilateral 05/12/2022    Procedure: BILATERAL TONSILLECTOMY;  Surgeon: Dane Lowery MD;  Location: [facility]    TUBAL LIGATION         Family History:    Family History   Problem Relation Age of Onset    Heart Disease Brother     Hypertension Brother     Asthma Daughter     Heart Disease Brother     Hypertension Brother     C.A.D. Father         MI     Hypertension Father     Lipids Father 50    C.A.D. Paternal Aunt         MI-50s    Cancer Maternal Aunt         Ovarian CA    Cerebrovascular Disease No family hx of     Breast Cancer No family hx of     Cancer - colorectal No  family hx of     Prostate Cancer No family hx of        Social History:  Marital Status:  Single [1]  Social History     Tobacco Use    Smoking status: Never    Smokeless tobacco: Never   Substance Use Topics    Alcohol use: Yes     Alcohol/week: 0.0 standard drinks of alcohol     Comment: rare     Drug use: No        Medications:    allopurinol (ZYLOPRIM) 100 MG tablet  allopurinol (ZYLOPRIM) 300 MG tablet  ASPIRIN 325 MG OR TBEC  atenolol (TENORMIN) 50 MG tablet  betamethasone dipropionate (DIPROSONE) 0.05 % external ointment  budesonide (PULMICORT) 0.5 MG/2ML neb solution  diazepam (VALIUM) 5 MG tablet  DIPHENHYDRAMINE HCL PO  fluocinonide (LIDEX) 0.05 % external solution  fluticasone (FLONASE) 50 MCG/ACT nasal spray  hydrOXYzine (VISTARIL) 50 MG capsule  indomethacin (INDOCIN) 50 MG capsule  levocetirizine (XYZAL) 5 MG tablet  meclizine (ANTIVERT) 25 MG tablet  metroNIDAZOLE (METROGEL) 0.75 % external gel  pravastatin (PRAVACHOL) 40 MG tablet  predniSONE (DELTASONE) 20 MG tablet  prochlorperazine (COMPAZINE) 10 MG tablet  traZODone (DESYREL) 100 MG tablet  triamterene-HCTZ (DYAZIDE) 37.5-25 MG capsule  venlafaxine (EFFEXOR XR) 150 MG 24 hr capsule      Review of Systems  CONSTITUTIONAL:NEGATIVE for fever, chills, change in weight  INTEGUMENTARY/SKIN: NEGATIVE for worrisome rashes, moles or lesions  RESP:NEGATIVE for significant cough or SOB  MUSCULOSKELETAL: POSITIVE  for right foot pain, swelling and NEGATIVE for other new onset arthralgias or myalgias   NEURO: NEGATIVE for numbness, paresthesias   Physical Exam   BP: 109/45  Pulse: 65  Temp: 98  F (36.7  C)  Resp: 17  SpO2: 96 %  Physical Exam  Constitutional:       General: She is not in acute distress.     Appearance: She is not ill-appearing or toxic-appearing.   HENT:      Head: Normocephalic and atraumatic.   Cardiovascular:      Pulses:           Dorsalis pedis pulses are 2+ on the right side.        Posterior tibial pulses are 2+ on the right side.    Musculoskeletal:      Right lower leg: No edema.      Right foot: Normal range of motion and normal capillary refill. Swelling and tenderness present. No deformity, foot drop, laceration or crepitus. Normal pulse.        Feet:    Skin:     Findings: Erythema (right foot overlying medial head of right first MTP joint) present. No abrasion, ecchymosis, rash or wound.   Neurological:      Mental Status: She is alert.      Sensory: No sensory deficit.       ED Course             Procedures              Critical Care time:  none               Results for orders placed or performed during the hospital encounter of 02/24/24   Foot  XR, G/E 3 views, right     Status: None    Narrative    EXAM: XR FOOT RIGHT G/E 3 VIEWS  LOCATION: [facility]  DATE: 2/24/2024    INDICATION: Pain, swelling, evaluate for bony abnormality.  COMPARISON: None.      Impression    IMPRESSION: No acute displaced right foot fracture. Mild first MTP joint arthrosis. No discrete erosive change. Mild scattered arthritic changes within the midfoot. Distal Achilles tendon enthesopathy. Calcaneal heel spur.     Medications - No data to display    Assessments & Plan (with Medical Decision Making)     I have reviewed the nursing notes.    I have reviewed the findings, diagnosis, plan and need for follow up with the patient.       New Prescriptions    No medications on file       Final diagnoses:   Right foot pain   63-year-old female presents to urgent care with concern over ongoing right foot pain previously diagnosed as gout for more than last month.  Physical exam findings did show mild erythema overlying the medial aspect of the head of the first MTP joint.  Swelling, tenderness palpation over the proximal lateral midfoot region.  As part of evaluation she had x-ray which was negative for evidence of acute fracture, dislocation did show mild first MTP joint arthrosis.  No discrete erosive changes.  Mild scattered arthritic  changes within the midfoot.  Symptoms do not appear classic for gout however this would remain on the differential would favor arthritis, tendinitis/overuse, exacerbation of foot pain secondary to her reported history of Achilles tendinitis.  I do not suspect cellulitis, DVT at this time.  She was discharged home with prescription for Indocin for pain control which we did treat potential gout flare cam boot for comfort.  Follow-up with primary care provider or podiatry if no improvement within the next week.  Worrisome reasons to return to the ER/UC sooner discussed.    Disclaimer: This note consists of symbols derived from keyboarding, dictation, and/or voice recognition software. As a result, there may be errors in the script that have gone undetected.  Please consider this when interpreting information found in the chart.      2/24/2024   [facility] EMERGENCY DEPT       Dolores Gerber PA-C  02/25/24 0439

## 2024-02-27 ENCOUNTER — MYC REFILL (OUTPATIENT)
Dept: FAMILY MEDICINE | Facility: CLINIC | Age: 64
End: 2024-02-27
Payer: COMMERCIAL

## 2024-02-27 DIAGNOSIS — I10 ESSENTIAL HYPERTENSION, BENIGN: ICD-10-CM

## 2024-02-27 RX ORDER — ATENOLOL 50 MG/1
50 TABLET ORAL DAILY
Qty: 90 TABLET | Refills: 2 | Status: SHIPPED | OUTPATIENT
Start: 2024-02-27

## 2024-03-03 ENCOUNTER — MYC REFILL (OUTPATIENT)
Dept: FAMILY MEDICINE | Facility: CLINIC | Age: 64
End: 2024-03-03
Payer: COMMERCIAL

## 2024-03-03 DIAGNOSIS — F41.9 ANXIETY: ICD-10-CM

## 2024-03-03 DIAGNOSIS — E78.5 HYPERLIPIDEMIA LDL GOAL <130: ICD-10-CM

## 2024-03-03 DIAGNOSIS — F51.01 PRIMARY INSOMNIA: ICD-10-CM

## 2024-03-04 NOTE — TELEPHONE ENCOUNTER
Requested Prescriptions   Pending Prescriptions Disp Refills    traZODone (DESYREL) 100 MG tablet 180 tablet 0     Sig: Take 2 tablets (200 mg) by mouth ONCE A DAY At Bedtime       Serotonin Modulators Passed - 3/3/2024  3:20 PM        Passed - Recent (12 mo) or future (30 days) visit within the authorizing provider's specialty     The patient must have completed an in-person or virtual visit within the past 12 months or has a future visit scheduled within the next 90 days with the authorizing provider s specialty.  Urgent care and e-visits do not quality as an office visit for this protocol.          Passed - Medication is active on med list        Passed - Patient is age 18 or older        Passed - No active pregnancy on record        Passed - No positive pregnancy test in past 12 months          venlafaxine (EFFEXOR XR) 150 MG 24 hr capsule 90 capsule 3     Sig: TAKE 1 CAPSULE (150 MG) BY MOUTH ONCE DAILY Strength: 150 mg       Serotonin-Norepinephrine Reuptake Inhibitors  Failed - 3/3/2024  3:20 PM        Failed - Normal serum creatinine on file in past 12 months     Recent Labs   Lab Test 02/07/23  1554   CR 0.84       Ok to refill medication if creatinine is low          Passed - Blood pressure under 140/90 in past 12 months     BP Readings from Last 3 Encounters:   02/24/24 109/45   12/25/23 138/88   11/14/23 116/48                 Passed - Recent (12 mo) or future (30 days) visit within the authorizing provider's specialty     The patient must have completed an in-person or virtual visit within the past 12 months or has a future visit scheduled within the next 90 days with the authorizing provider s specialty.  Urgent care and e-visits do not quality as an office visit for this protocol.          Passed - Medication is active on med list        Passed - ELIZA-7 score of less than 5 in past 6 months.     Please review last ELIZA-7 score.           Passed - Patient is age 18 or older        Passed - No active  pregnancy on record        Passed - No positive pregnancy test in past 12 months          pravastatin (PRAVACHOL) 40 MG tablet 90 tablet 3     Sig: TAKE 1 TABLET BY MOUTH nightly AT BEDTIMETAKE 1 TABLET BY MOUTH nightly AT BEDTIME Strength: 40 mg       Antihyperlipidemic agents Failed - 3/3/2024  3:20 PM        Failed - Lipid panel on file in past 12 mos     Recent Labs   Lab Test 02/07/23  1554   CHOL 171   TRIG 233*   HDL 40*   LDL 84   NHDL 131*               Failed - Normal serum ALT on record in past 12 mos     Recent Labs   Lab Test 05/03/22  1627   ALT 49             Passed - Recent (12 mo) or future (30 days) visit within the authorizing provider's specialty     The patient must have completed an in-person or virtual visit within the past 12 months or has a future visit scheduled within the next 90 days with the authorizing provider s specialty.  Urgent care and e-visits do not quality as an office visit for this protocol.          Passed - Medication is active on med list        Passed - Patient is age 18 years or older        Passed - No active pregnancy on record        Passed - No positive pregnancy test in past 12 mos

## 2024-03-05 RX ORDER — VENLAFAXINE HYDROCHLORIDE 150 MG/1
CAPSULE, EXTENDED RELEASE ORAL
Qty: 90 CAPSULE | Refills: 3 | Status: SHIPPED | OUTPATIENT
Start: 2024-03-05 | End: 2024-03-19

## 2024-03-05 RX ORDER — PRAVASTATIN SODIUM 40 MG
TABLET ORAL
Qty: 90 TABLET | Refills: 3 | Status: SHIPPED | OUTPATIENT
Start: 2024-03-05

## 2024-03-05 RX ORDER — TRAZODONE HYDROCHLORIDE 100 MG/1
TABLET ORAL
Qty: 180 TABLET | Refills: 0 | Status: SHIPPED | OUTPATIENT
Start: 2024-03-05 | End: 2024-05-31

## 2024-03-07 ENCOUNTER — HOSPITAL ENCOUNTER (OUTPATIENT)
Dept: MAMMOGRAPHY | Facility: CLINIC | Age: 64
Discharge: HOME | End: 2024-03-07
Attending: FAMILY MEDICINE | Admitting: FAMILY MEDICINE
Payer: COMMERCIAL

## 2024-03-07 DIAGNOSIS — Z12.31 VISIT FOR SCREENING MAMMOGRAM: ICD-10-CM

## 2024-03-07 PROCEDURE — 77063 BREAST TOMOSYNTHESIS BI: CPT

## 2024-03-18 ENCOUNTER — HOSPITAL ENCOUNTER (OUTPATIENT)
Dept: MRI IMAGING | Facility: CLINIC | Age: 64
Discharge: HOME | End: 2024-03-18
Attending: FAMILY MEDICINE | Admitting: FAMILY MEDICINE
Payer: COMMERCIAL

## 2024-03-18 DIAGNOSIS — K86.2 PANCREATIC CYST: ICD-10-CM

## 2024-03-18 PROCEDURE — A9585 GADOBUTROL INJECTION: HCPCS | Performed by: FAMILY MEDICINE

## 2024-03-18 PROCEDURE — 255N000002 HC RX 255 OP 636: Performed by: FAMILY MEDICINE

## 2024-03-18 PROCEDURE — 74183 MRI ABD W/O CNTR FLWD CNTR: CPT

## 2024-03-18 PROCEDURE — 258N000003 HC RX IP 258 OP 636: Performed by: FAMILY MEDICINE

## 2024-03-18 RX ORDER — GADOBUTROL 604.72 MG/ML
9.5 INJECTION INTRAVENOUS ONCE
Status: COMPLETED | OUTPATIENT
Start: 2024-03-18 | End: 2024-03-18

## 2024-03-18 RX ADMIN — GADOBUTROL 9.5 ML: 604.72 INJECTION INTRAVENOUS at 14:40

## 2024-03-18 RX ADMIN — SODIUM CHLORIDE 50 ML: 9 INJECTION, SOLUTION INTRAVENOUS at 14:40

## 2024-03-19 ENCOUNTER — MYC REFILL (OUTPATIENT)
Dept: FAMILY MEDICINE | Facility: CLINIC | Age: 64
End: 2024-03-19
Payer: COMMERCIAL

## 2024-03-19 DIAGNOSIS — F41.9 ANXIETY: ICD-10-CM

## 2024-03-19 RX ORDER — VENLAFAXINE HYDROCHLORIDE 150 MG/1
CAPSULE, EXTENDED RELEASE ORAL
Qty: 90 CAPSULE | Refills: 3 | Status: SHIPPED | OUTPATIENT
Start: 2024-03-19 | End: 2024-06-10

## 2024-03-21 ENCOUNTER — VIRTUAL VISIT (OUTPATIENT)
Dept: FAMILY MEDICINE | Facility: CLINIC | Age: 64
End: 2024-03-21
Payer: COMMERCIAL

## 2024-03-21 DIAGNOSIS — H10.33 ACUTE CONJUNCTIVITIS OF BOTH EYES, UNSPECIFIED ACUTE CONJUNCTIVITIS TYPE: Primary | ICD-10-CM

## 2024-03-21 PROCEDURE — 99213 OFFICE O/P EST LOW 20 MIN: CPT | Mod: 95 | Performed by: PHYSICIAN ASSISTANT

## 2024-03-21 RX ORDER — POLYMYXIN B SULFATE AND TRIMETHOPRIM 1; 10000 MG/ML; [USP'U]/ML
1-2 SOLUTION OPHTHALMIC EVERY 4 HOURS
Qty: 10 ML | Refills: 0 | Status: SHIPPED | OUTPATIENT
Start: 2024-03-21

## 2024-03-21 NOTE — PROGRESS NOTES
"Marlyn is a 63 year old who is being evaluated via a billable video visit.    How would you like to obtain your AVS? MyChart  If the video visit is dropped, the invitation should be resent by: Text to cell phone: [phone]  Will anyone else be joining your video visit? No      Assessment & Plan     Acute conjunctivitis of both eyes, unspecified acute conjunctivitis type    - polymixin b-trimethoprim (POLYTRIM) 09093-7.1 UNIT/ML-% ophthalmic solution; Place 1-2 drops into both eyes every 4 hours            BMI  Estimated body mass index is 36.22 kg/m  as calculated from the following:    Height as of 12/21/23: 1.626 m (5' 4\").    Weight as of 12/21/23: 95.7 kg (211 lb).             Subjective   Marlyn is a 63 year old, presenting for the following health issues:  Conjunctivitis        12/21/2023     4:42 PM   Additional Questions   Roomed by Shyann RUEDA CMA     Video Start Time:  1159    Conjunctivitis    History of Present Illness       Reason for visit:  Pink eye  Symptom onset:  1-3 days ago  Symptoms include:  Red, swollen itchy eyes with gooey greenish discharge. Eyes glued shut this morning  Symptom intensity:  Severe  Symptom progression:  Worsening  Had these symptoms before:  Yes  What makes it better:  Cold washcloth on eyes.    She eats 2-3 servings of fruits and vegetables daily.She consumes 0 sweetened beverage(s) daily.She exercises with enough effort to increase her heart rate 20 to 29 minutes per day.  She exercises with enough effort to increase her heart rate 3 or less days per week.   She is taking medications regularly.               Review of Systems  Constitutional, HEENT, cardiovascular, pulmonary, gi and gu systems are negative, except as otherwise noted.      Objective           Vitals:  No vitals were obtained today due to virtual visit.    Physical Exam   GENERAL: alert and no distress  EYES: conjunctiva/corneas- yellow colored discharge present bilateral  RESP: No audible wheeze, cough, or " visible cyanosis.    SKIN: Visible skin clear. No significant rash, abnormal pigmentation or lesions.  NEURO: Cranial nerves grossly intact.  Mentation and speech appropriate for age.  PSYCH: Appropriate affect, tone, and pace of words          Video-Visit Details    Type of service:  Video Visit   Video End Time: 1208  Originating Location (pt. Location): Home    Distant Location (provider location):  On-site  Platform used for Video Visit: Kaegan  Signed Electronically by: King Mcelroy PA-C

## 2024-04-13 ENCOUNTER — HEALTH MAINTENANCE LETTER (OUTPATIENT)
Age: 64
End: 2024-04-13

## 2024-04-24 ENCOUNTER — MYC REFILL (OUTPATIENT)
Dept: FAMILY MEDICINE | Facility: CLINIC | Age: 64
End: 2024-04-24
Payer: COMMERCIAL

## 2024-04-24 ENCOUNTER — MYC REFILL (OUTPATIENT)
Dept: OTOLARYNGOLOGY | Facility: CLINIC | Age: 64
End: 2024-04-24
Payer: COMMERCIAL

## 2024-04-24 DIAGNOSIS — J32.4 CHRONIC PANSINUSITIS: ICD-10-CM

## 2024-04-24 DIAGNOSIS — F06.4 ANXIETY DISORDER DUE TO MEDICAL CONDITION: ICD-10-CM

## 2024-04-24 DIAGNOSIS — J30.2 SEASONAL ALLERGIC RHINITIS, UNSPECIFIED TRIGGER: ICD-10-CM

## 2024-04-24 DIAGNOSIS — F41.9 ANXIETY: ICD-10-CM

## 2024-04-24 DIAGNOSIS — R09.82 PND (POST-NASAL DRIP): ICD-10-CM

## 2024-04-25 RX ORDER — FLUTICASONE PROPIONATE 50 MCG
SPRAY, SUSPENSION (ML) NASAL
Qty: 16 G | Refills: 2 | Status: SHIPPED | OUTPATIENT
Start: 2024-04-25

## 2024-04-25 RX ORDER — LEVOCETIRIZINE DIHYDROCHLORIDE 5 MG/1
5 TABLET, FILM COATED ORAL EVERY EVENING
Qty: 90 TABLET | Refills: 3 | OUTPATIENT
Start: 2024-04-25

## 2024-04-25 NOTE — TELEPHONE ENCOUNTER
Prescription approved per [facility] Refill Protocol     Sary Montano     RN MSN

## 2024-04-25 NOTE — TELEPHONE ENCOUNTER
Refused Prescriptions:                       Disp   Refills    levocetirizine (XYZAL) 5 MG tablet         90 tab*3        Sig: Take 1 tablet (5 mg) by mouth every evening  Refused By: DESIRE CHRISTOPHER  Reason for Refusal: Patient needs appointment    Desire Christopher RN Care Coordinator, [facility] 04/25/24 11:04 AM

## 2024-04-29 RX ORDER — DIAZEPAM 5 MG
TABLET ORAL
Qty: 45 TABLET | Refills: 5 | Status: SHIPPED | OUTPATIENT
Start: 2024-04-29 | End: 2024-09-16

## 2024-05-08 ENCOUNTER — MYC REFILL (OUTPATIENT)
Dept: FAMILY MEDICINE | Facility: CLINIC | Age: 64
End: 2024-05-08
Payer: COMMERCIAL

## 2024-05-08 DIAGNOSIS — I10 ESSENTIAL HYPERTENSION, BENIGN: ICD-10-CM

## 2024-05-08 DIAGNOSIS — M1A.0710 CHRONIC IDIOPATHIC GOUT INVOLVING TOE OF RIGHT FOOT WITHOUT TOPHUS: ICD-10-CM

## 2024-05-10 RX ORDER — TRIAMTERENE AND HYDROCHLOROTHIAZIDE 37.5; 25 MG/1; MG/1
CAPSULE ORAL
Qty: 90 CAPSULE | Refills: 2 | Status: SHIPPED | OUTPATIENT
Start: 2024-05-10

## 2024-05-10 RX ORDER — ALLOPURINOL 300 MG/1
300 TABLET ORAL DAILY
Qty: 90 TABLET | Refills: 1 | Status: SHIPPED | OUTPATIENT
Start: 2024-05-10

## 2024-05-27 ENCOUNTER — MYC REFILL (OUTPATIENT)
Dept: FAMILY MEDICINE | Facility: CLINIC | Age: 64
End: 2024-05-27
Payer: COMMERCIAL

## 2024-05-27 DIAGNOSIS — F51.01 PRIMARY INSOMNIA: ICD-10-CM

## 2024-05-28 NOTE — TELEPHONE ENCOUNTER
Routing refill request to provider for review/approval because:  Patient needs to be seen because:  greater than 1 year since last annual physical          Requested Prescriptions   Pending Prescriptions Disp Refills    traZODone (DESYREL) 100 MG tablet 180 tablet 0     Sig: Take 2 tablets (200 mg) by mouth ONCE A DAY At Bedtime       Serotonin Modulators Passed - 5/27/2024 10:18 PM        Passed - Recent (12 mo) or future (30 days) visit within the authorizing provider's specialty     The patient must have completed an in-person or virtual visit within the past 12 months or has a future visit scheduled within the next 90 days with the authorizing provider s specialty.  Urgent care and e-visits do not quality as an office visit for this protocol.          Passed - Medication is active on med list        Passed - Patient is age 18 or older        Passed - No active pregnancy on record        Passed - No positive pregnancy test in past 12 months                 Riley Harrison RN 05/28/24 3:32 PM

## 2024-05-31 DIAGNOSIS — F51.01 PRIMARY INSOMNIA: ICD-10-CM

## 2024-05-31 RX ORDER — TRAZODONE HYDROCHLORIDE 100 MG/1
TABLET ORAL
Qty: 180 TABLET | Refills: 0 | Status: SHIPPED | OUTPATIENT
Start: 2024-05-31

## 2024-05-31 NOTE — TELEPHONE ENCOUNTER
Pt needs this today if possible as her insurance ends today.      Cyndie León on 5/31/2024 at 10:19 AM

## 2024-06-03 RX ORDER — TRAZODONE HYDROCHLORIDE 100 MG/1
TABLET ORAL
Qty: 180 TABLET | Refills: 0 | Status: SHIPPED | OUTPATIENT
Start: 2024-06-03

## 2024-06-10 ENCOUNTER — MYC REFILL (OUTPATIENT)
Dept: FAMILY MEDICINE | Facility: CLINIC | Age: 64
End: 2024-06-10
Payer: COMMERCIAL

## 2024-06-10 DIAGNOSIS — F41.9 ANXIETY: ICD-10-CM

## 2024-06-10 RX ORDER — VENLAFAXINE HYDROCHLORIDE 150 MG/1
CAPSULE, EXTENDED RELEASE ORAL
Qty: 90 CAPSULE | Refills: 0 | OUTPATIENT
Start: 2024-06-10

## 2024-06-10 ASSESSMENT — ANXIETY QUESTIONNAIRES
5. BEING SO RESTLESS THAT IT IS HARD TO SIT STILL: NOT AT ALL
7. FEELING AFRAID AS IF SOMETHING AWFUL MIGHT HAPPEN: NOT AT ALL
2. NOT BEING ABLE TO STOP OR CONTROL WORRYING: NOT AT ALL
6. BECOMING EASILY ANNOYED OR IRRITABLE: SEVERAL DAYS
3. WORRYING TOO MUCH ABOUT DIFFERENT THINGS: NOT AT ALL
1. FEELING NERVOUS, ANXIOUS, OR ON EDGE: SEVERAL DAYS
4. TROUBLE RELAXING: SEVERAL DAYS
8. IF YOU CHECKED OFF ANY PROBLEMS, HOW DIFFICULT HAVE THESE MADE IT FOR YOU TO DO YOUR WORK, TAKE CARE OF THINGS AT HOME, OR GET ALONG WITH OTHER PEOPLE?: NOT DIFFICULT AT ALL
GAD7 TOTAL SCORE: 3
IF YOU CHECKED OFF ANY PROBLEMS ON THIS QUESTIONNAIRE, HOW DIFFICULT HAVE THESE PROBLEMS MADE IT FOR YOU TO DO YOUR WORK, TAKE CARE OF THINGS AT HOME, OR GET ALONG WITH OTHER PEOPLE: NOT DIFFICULT AT ALL
7. FEELING AFRAID AS IF SOMETHING AWFUL MIGHT HAPPEN: NOT AT ALL
GAD7 TOTAL SCORE: 3

## 2024-06-10 ASSESSMENT — PATIENT HEALTH QUESTIONNAIRE - PHQ9
10. IF YOU CHECKED OFF ANY PROBLEMS, HOW DIFFICULT HAVE THESE PROBLEMS MADE IT FOR YOU TO DO YOUR WORK, TAKE CARE OF THINGS AT HOME, OR GET ALONG WITH OTHER PEOPLE: SOMEWHAT DIFFICULT
SUM OF ALL RESPONSES TO PHQ QUESTIONS 1-9: 6
SUM OF ALL RESPONSES TO PHQ QUESTIONS 1-9: 6

## 2024-06-12 RX ORDER — VENLAFAXINE HYDROCHLORIDE 150 MG/1
CAPSULE, EXTENDED RELEASE ORAL
Qty: 90 CAPSULE | Refills: 3 | Status: SHIPPED | OUTPATIENT
Start: 2024-06-12 | End: 2024-09-16

## 2024-07-02 ENCOUNTER — OFFICE VISIT (OUTPATIENT)
Dept: PEDIATRICS | Facility: CLINIC | Age: 64
End: 2024-07-02
Payer: COMMERCIAL

## 2024-07-02 ENCOUNTER — HOSPITAL ENCOUNTER (OUTPATIENT)
Dept: CT IMAGING | Facility: HOSPITAL | Age: 64
Discharge: HOME | End: 2024-07-02
Attending: EMERGENCY MEDICINE | Admitting: EMERGENCY MEDICINE
Payer: COMMERCIAL

## 2024-07-02 ENCOUNTER — OFFICE VISIT (OUTPATIENT)
Dept: FAMILY MEDICINE | Facility: CLINIC | Age: 64
End: 2024-07-02
Payer: COMMERCIAL

## 2024-07-02 ENCOUNTER — NURSE TRIAGE (OUTPATIENT)
Dept: FAMILY MEDICINE | Facility: CLINIC | Age: 64
End: 2024-07-02

## 2024-07-02 VITALS
RESPIRATION RATE: 18 BRPM | SYSTOLIC BLOOD PRESSURE: 121 MMHG | OXYGEN SATURATION: 97 % | TEMPERATURE: 98.5 F | HEART RATE: 59 BPM | DIASTOLIC BLOOD PRESSURE: 55 MMHG

## 2024-07-02 VITALS
OXYGEN SATURATION: 97 % | HEART RATE: 59 BPM | BODY MASS INDEX: 36.15 KG/M2 | SYSTOLIC BLOOD PRESSURE: 121 MMHG | WEIGHT: 210.6 LBS | RESPIRATION RATE: 18 BRPM | TEMPERATURE: 98.5 F | DIASTOLIC BLOOD PRESSURE: 55 MMHG

## 2024-07-02 DIAGNOSIS — S00.03XA CONTUSION OF OCCIPITAL REGION OF SCALP, INITIAL ENCOUNTER: ICD-10-CM

## 2024-07-02 DIAGNOSIS — S00.03XA CONTUSION OF OCCIPITAL REGION OF SCALP, INITIAL ENCOUNTER: Primary | ICD-10-CM

## 2024-07-02 DIAGNOSIS — S09.90XA TRAUMATIC INJURY OF HEAD, INITIAL ENCOUNTER: Primary | ICD-10-CM

## 2024-07-02 PROCEDURE — 99207 REFERRAL TO ACUTE AND DIAGNOSTIC SERVICES: CPT | Performed by: PHYSICIAN ASSISTANT

## 2024-07-02 PROCEDURE — 72125 CT NECK SPINE W/O DYE: CPT

## 2024-07-02 PROCEDURE — 70450 CT HEAD/BRAIN W/O DYE: CPT

## 2024-07-02 PROCEDURE — 99214 OFFICE O/P EST MOD 30 MIN: CPT | Performed by: EMERGENCY MEDICINE

## 2024-07-02 RX ORDER — METHOCARBAMOL 500 MG/1
1000 TABLET, FILM COATED ORAL 3 TIMES DAILY
Qty: 30 TABLET | Refills: 0 | Status: SHIPPED | OUTPATIENT
Start: 2024-07-02 | End: 2024-07-07

## 2024-07-02 RX ORDER — BUTALBITAL, ACETAMINOPHEN AND CAFFEINE 50; 325; 40 MG/1; MG/1; MG/1
1-2 TABLET ORAL EVERY 8 HOURS PRN
Qty: 24 TABLET | Refills: 0 | Status: SHIPPED | OUTPATIENT
Start: 2024-07-02

## 2024-07-02 ASSESSMENT — PAIN SCALES - GENERAL: PAINLEVEL: MODERATE PAIN (5)

## 2024-07-02 NOTE — PATIENT INSTRUCTIONS
Follow concussion protocol over the next week by resting your brain and limiting your physical activity    A referral was placed into the computer system for you to be seen in the concussion clinic.  They should call you in the next 24 hours to help you set up an appointment to be seen sometime in the next 1 to 2 weeks.  If they do not call you please call them at [phone]    Fill your prescriptions and take as directed    Follow-up with your own primary care clinic in the next 1 to 2 weeks should you have any concerns

## 2024-07-02 NOTE — PATIENT INSTRUCTIONS
(S09.90XA) Traumatic injury of head, initial encounter  (primary encounter diagnosis)  Comment: Discussed patient with Dr. Jose who has excepted her for further evaluation.   Plan: Referred to [facility] now

## 2024-07-02 NOTE — TELEPHONE ENCOUNTER
Nurse Triage SBAR    Is this a 2nd Level Triage? NO    Situation: Patient with concussion like symptoms following head injury.    Background: Fell on 6/30 while walking outside. Slipped and hit back of head on concrete.     Assessment:   No open wounds. But severe pain (8/10). Nausea, occasional confusion, difficulty focusing, blurry vision.    Protocol Recommended Disposition:   Call [facility]/Go to ED/[facility]CC Now (Or To Office with PCP Approval)    Recommendation: N/A, FYI only     Routed to provider    Does the patient meet one of the following criteria for [facility] visit consideration? 16+ years old, with an [facility] PCP     TIP  Providers, please consider if this condition is appropriate for management at one of our Acute and Diagnostic Services sites.     If patient is a good candidate, please use dotphrase <dot>triageresponse and select Refer to [facility] to document.      Reason for Disposition   SEVERE headache    Additional Information   Negative: ACUTE NEUROLOGIC SYMPTOM and symptom present now   Negative: Knocked out (unconscious) > 1 minute   Negative: Seizure (convulsion) occurred  (Exception: Prior history of seizures and now alert and without Acute Neurologic Symptoms.)   Negative: Neck pain after dangerous injury (e.g., MVA, diving, trampoline, contact sports, fall > 10 feet or 3 meters)  (Exception: Neck pain began > 1 hour after injury.)   Negative: Major bleeding (actively dripping or spurting) that can't be stopped   Negative: Penetrating head injury (e.g., knife, gunshot wound, metal object)   Negative: Sounds like a life-threatening emergency to the triager   Negative: Can't remember what happened (amnesia)   Negative: Vomiting once or more   Negative: Watery or blood-tinged fluid dripping from the nose or ears   Negative: Diagnosed with a concussion within last 14 days   Negative: ACUTE NEUROLOGIC SYMPTOM and now fine   Negative: Knocked out (unconscious) < 1 minute and now fine    Answer Assessment - Initial  "Assessment Questions  1. MECHANISM: \"How did the injury happen?\" For falls, ask: \"What height did you fall from?\" and \"What surface did you fall against?\"       Walking outside and slipped on \"slimy\" patch  2. ONSET: \"When did the injury happen?\" (Minutes or hours ago)       Sunday 6/30   3. NEUROLOGIC SYMPTOMS: \"Was there any loss of consciousness?\" \"Are there any other neurological symptoms?\"       No LOC. Occassional confusion, difficulty focusing, blurry vision  4. MENTAL STATUS: \"Does the person know who they are, who you are, and where they are?\"       A&O x3  5. LOCATION: \"What part of the head was hit?\"       Occipital region  6. SCALP APPEARANCE: \"What does the scalp look like? Is it bleeding now?\" If Yes, ask: \"Is it difficult to stop?\"       WNL  7. SIZE: For cuts, bruises, or swelling, ask: \"How large is it?\" (e.g., inches or centimeters)       N/a  8. PAIN: \"Is there any pain?\" If Yes, ask: \"How bad is it?\"  (e.g., Scale 1-10; or mild, moderate, severe)      Severe 8/10  9. TETANUS: For any breaks in the skin, ask: \"When was the last tetanus booster?\"      N/a  10. BLOOD THINNERS: \"Do you take any blood thinners?\" (e.g., aspirin, clopidogrel / Plavix, coumadin, heparin). Notes: Other strong blood thinners include: Arixtra (fondaparinux), Eliquis (apixaban), Pradaxa (dabigatran), and Xarelto (rivaroxaban).        No   11. OTHER SYMPTOMS: \"Do you have any other symptoms?\" (e.g., neck pain, vomiting)        nausea  12. PREGNANCY: \"Is there any chance you are pregnant?\" \"When was your last menstrual period?\"        No    Protocols used: Head Injury-A-[place]    "

## 2024-07-02 NOTE — PROGRESS NOTES
Patient presents with:  Head Injury: Pt was walking Sunday and slipped and landed on the back of her head on sidewalk   Headache's, lightheaded ness and nausea     (S09.90XA) Traumatic injury of head, initial encounter  (primary encounter diagnosis)  Comment: Discussed patient with Dr. Jose who has excepted her for further evaluation.   Plan: Referred to ADS now          SUBJECTIVE:   Kenya Rubalcava is a 63 year old female who presents today with headache, dizziness and nausea since she fell on 6/30/2024 and hit her head on cement.  Denies any loss of consciousness.      Patient Active Problem List   Diagnosis    Anxiety disorder due to medical condition    Essential hypertension, benign    Sensorineural hearing loss    Peripheral vertigo    Allergic rhinitis    Symptomatic menopausal or female climacteric states    Psoriasis    Hyperlipidemia LDL goal <130    Kidney stones    Hypertriglyceridemia    Meniere disease    Anxiety    Primary insomnia    Chronic pansinusitis    Chronic tonsillitis    Class 2 severe obesity due to excess calories with serious comorbidity in adult (H)         Past Medical History:   Diagnosis Date    Abnormal Papanicolaou smear of cervix and cervical HPV 1986    Anxiety     Anxiety disorder in conditions classified elsewhere     Calculus of kidney 08/11/1988    Calcium renal stone, oxylate    Calculus of ureter 06/30/1985    LEFT urteral stone    Excessive or frequent menstruation 07/07/2003    Labyrinthitis, unspecified     Lump or mass in breast 10/27/1999    RIGHT breast mass    Variants of migraine, not elsewhere classified, without mention of intractable migraine without mention of status migrainosus 02/07/2002         Current Outpatient Medications   Medication Sig Dispense Refill    Multiple Vitamins-Iron (DAILY-ESCOBAR/IRON/BETA-CAROTENE) TABS TAKE 1 TABLET BY MOUTH DAILY. (Patient not taking: Reported on 10/19/2020) 30 tablet 7     Social History     Tobacco Use    Smoking  status: Never Smoker    Smokeless tobacco: Never Used   Substance Use Topics    Alcohol use: Not on file     Family History   Problem Relation Age of Onset    Diabetes Mother     Diabetes Father          ROS:    10 point ROS of systems including Constitutional, Eyes, Respiratory, Cardiovascular, Gastroenterology, Genitourinary, Integumentary, Muscularskeletal, Psychiatric ,neurological were all negative except for pertinent positives noted in my HPI       OBJECTIVE:  /55 (BP Location: Left arm, Patient Position: Sitting, Cuff Size: Adult Large)   Pulse 59   Temp 98.5  F (36.9  C) (Oral)   Resp 18   SpO2 97%   Physical Exam:  GENERAL APPEARANCE: healthy, alert and no distress  EYES:  conjunctiva clear  SKIN: no suspicious lesions or rashes

## 2024-07-02 NOTE — PROGRESS NOTES
[facility] Clinic Visit    Nursing triage note    Levon Cline is a 63 year old, presenting for the following health issues:  Headache      Objective    /55   Pulse 59   Temp 98.5  F (36.9  C) (Oral)   Resp 18   Wt 95.5 kg (210 lb 9.6 oz)   SpO2 97%   BMI 36.15 kg/m    Body mass index is 36.15 kg/m .          ADS PROVIDER NOTE  [facility]      History     Chief Complaint   Patient presents with    Headache     HPI  Kenya Rubalcava is a 63 year old female who states she slipped and fell on a sidewalk 2 days ago landing on her back and head and presents now with headache some slight blurriness of vision and some generalized stiffness.  Patient states she was out walking with her son.  She states her son was on the bike and was just ahead of her.  She states that he went across this wet patch on a sidewalk and found out that it was just slime.  Patient states she slipped fell backwards and hit the back of her head as well as her back.  Patient had no vomiting but did have some dizziness with some nausea.  Patient had no loss of consciousness.  Patient denies any focal numbness or weakness and states that she is on no blood thinners.    I have reviewed the Medications, Allergies, Past Medical and Surgical History, and Social History in the [facility] system.    Past Medical History:   Diagnosis Date    Abnormal Papanicolaou smear of cervix and cervical HPV 1986    Anxiety     Anxiety disorder in conditions classified elsewhere     Calculus of kidney 08/11/1988    Calcium renal stone, oxylate    Calculus of ureter 06/30/1985    LEFT urteral stone    Excessive or frequent menstruation 07/07/2003    Labyrinthitis, unspecified     Lump or mass in breast 10/27/1999    RIGHT breast mass    Variants of migraine, not elsewhere classified, without mention of intractable migraine without mention of status migrainosus 02/07/2002       Past Surgical History:   Procedure Laterality Date     CARPAL TUNNEL RELEASE RT/LT  08/01/2008    Left     COLONOSCOPY  08/07/2012    Procedure: COLONOSCOPY;  Colonoscopy;  Surgeon: Kailyn Lopez MD;  Location: [facility]    COLONOSCOPY N/A 09/21/2022    Procedure: COLONOSCOPY;  Surgeon: Denton Montilla DO;  Location: [facility]    CYSTOSCOPY, RETROGRADES, INSERT STENT URETER(S), COMBINED Left 08/19/2016    Procedure: COMBINED CYSTOSCOPY, RETROGRADES, INSERT STENT URETER(S);  Surgeon: Terence Carpenter MD;  Location: [facility]    esteem implant  06/23/2011    Hearing implant right ear.    HYSTERECTOMY, PAP NO LONGER INDICATED  12/01/2004    Vaginal hysterectomy    LAPAROSCOPIC ABLATION ENDOMETRIOSIS  07/01/2004    LASER HOLMIUM LITHOTRIPSY URETER(S), INSERT STENT, COMBINED Left 09/12/2016    Procedure: COMBINED CYSTOSCOPY, URETEROSCOPY, LASER HOLMIUM LITHOTRIPSY URETER(S), INSERT STENT;  Surgeon: Kailyn Dozier MD;  Location: [facility]    LEFT wrist surgery      OPTICAL TRACKING SYSTEM ENDOSCOPIC SINUS SURGERY Bilateral 05/12/2022    Procedure: IMAGE GUIDED FUNCTIONAL BILATERAL  ENDOSCOPIC SINUS SURGERY (FESS) WITHOUT SEPTOPLASTY;  Surgeon: Dane Lowery MD;  Location: [facility]    Sinus surgery with repair of deviated septum      TONSILLECTOMY, ADENOIDECTOMY ADULT, COMBINED Bilateral 05/12/2022    Procedure: BILATERAL TONSILLECTOMY;  Surgeon: Dane Lowery MD;  Location: [facility]    TUBAL LIGATION           Dose / Directions   * allopurinol 100 MG tablet  Commonly known as: ZYLOPRIM  Used for: Chronic idiopathic gout involving toe of right foot without tophus      Dose: 100 mg  Take 1 tablet (100 mg) by mouth daily For 30 days then increase to 2 daily for 30 day  Quantity: 90 tablet  Refills: 0     * allopurinol 300 MG tablet  Commonly known as: ZYLOPRIM  Used for: Chronic idiopathic gout involving toe of right foot without tophus      Dose: 300 mg  Take 1 tablet (300 mg) by mouth daily After tapering up  Quantity: 90 tablet  Refills: 1      aspirin 325 MG EC tablet  Commonly known as: ASA      one tablet by mouth daily (buffered)  Refills: 0     atenolol 50 MG tablet  Commonly known as: TENORMIN  Used for: Essential hypertension, benign      Dose: 50 mg  Take 1 tablet (50 mg) by mouth daily TAKE 1 TABLET (50 MG) BY MOUTH ONCE DAILY Strength: 50 mg  Quantity: 90 tablet  Refills: 2     betamethasone dipropionate 0.05 % external ointment  Commonly known as: DIPROSONE  Used for: Psoriasis      Apply 1g  sparingly to psoriatic plaques  twice daily as needed.  1 tube per month Do not apply to face.  Quantity: 45 g  Refills: 4     budesonide 0.5 MG/2ML neb solution  Commonly known as: PULMICORT  Used for: Chronic pansinusitis, Seasonal allergic rhinitis, unspecified trigger      Add one ampule into Neilmed sinus rinse bottle, with saline mixture, and irrigate nose daily  Quantity: 60 mL  Refills: 11     diazepam 5 MG tablet  Commonly known as: VALIUM  Used for: Anxiety, Anxiety disorder due to medical condition      TAKE 1 TABLET BY MOUTH EVERY 12 HOURS AS NEEDED FOR ANXIETY OR SLEEP.  Quantity: 45 tablet  Refills: 5     DIPHENHYDRAMINE HCL PO      Dose: 25 mg  Take 25 mg by mouth daily as needed (for Menieres disease)  Refills: 0     fluocinonide 0.05 % external solution  Commonly known as: LIDEX  Used for: Scalp psoriasis      Apply to scalp BID x 1-2 weeks PRN  Quantity: 50 mL  Refills: 3     fluticasone 50 MCG/ACT nasal spray  Commonly known as: FLONASE  Used for: PND (post-nasal drip)      Spray 1 to 2 sprays into both nostrils daily  Quantity: 16 g  Refills: 2     hydrOXYzine 50 MG capsule  Commonly known as: VISTARIL  Used for: Infection due to 2019 novel coronavirus      Dose: 50 mg  Take 1 capsule (50 mg) by mouth 3 times daily as needed for itching  Quantity: 14 capsule  Refills: 0     * indomethacin 50 MG capsule  Commonly known as: INDOCIN      Dose: 50 mg  Take 1 capsule (50 mg) by mouth 2 times daily (with meals)  Quantity: 20  capsule  Refills: 0     * indomethacin 50 MG capsule  Commonly known as: INDOCIN  Used for: Acute gouty arthritis      Dose: 50 mg  Take 1 capsule (50 mg) by mouth 2 times daily (with meals)  Quantity: 14 capsule  Refills: 0     levocetirizine 5 MG tablet  Commonly known as: XYZAL  Used for: Chronic pansinusitis, Seasonal allergic rhinitis, unspecified trigger      Dose: 5 mg  Take 1 tablet (5 mg) by mouth every evening  Quantity: 90 tablet  Refills: 3     meclizine 25 MG tablet  Commonly known as: ANTIVERT  Used for: Meniere disease      Dose: 50 mg  Take 2 tablets by mouth 2 times daily as needed.  Quantity: 60 tablet  Refills: 0     metroNIDAZOLE 0.75 % external gel  Commonly known as: METROGEL  Used for: Rosacea      Apply to face BID  Quantity: 45 g  Refills: 11     polymixin b-trimethoprim 74070-5.1 UNIT/ML-% ophthalmic solution  Commonly known as: POLYTRIM  Used for: Acute conjunctivitis of both eyes, unspecified acute conjunctivitis type      Dose: 1-2 drop  Place 1-2 drops into both eyes every 4 hours  Quantity: 10 mL  Refills: 0     pravastatin 40 MG tablet  Commonly known as: PRAVACHOL  Used for: Hyperlipidemia LDL goal <130      TAKE 1 TABLET BY MOUTH nightly AT BEDTIMETAKE 1 TABLET BY MOUTH nightly AT BEDTIME Strength: 40 mg  Quantity: 90 tablet  Refills: 3     prochlorperazine 10 MG tablet  Commonly known as: Compazine      Dose: 5 mg  Take 0.5 tablets (5 mg) by mouth every 6 hours as needed for nausea or vomiting  Quantity: 10 tablet  Refills: 1     * traZODone 100 MG tablet  Commonly known as: DESYREL  Used for: Primary insomnia      Take 2 tablets (200 mg) by mouth ONCE A DAY At Bedtime  Quantity: 180 tablet  Refills: 0     * traZODone 100 MG tablet  Commonly known as: DESYREL  Used for: Primary insomnia      Take 2 tablets (200 mg) by mouth ONCE A DAY At Bedtime  Quantity: 180 tablet  Refills: 0     triamterene-HCTZ 37.5-25 MG capsule  Commonly known as: DYAZIDE  Used for: Essential hypertension,  benign      TAKE 1 CAPSULE BY MOUTH ONCE EVERY MORNING Strength: 37.5-25 mg  Quantity: 90 capsule  Refills: 2     venlafaxine 150 MG 24 hr capsule  Commonly known as: EFFEXOR XR  Used for: Anxiety      TAKE 1 CAPSULE (150 MG) BY MOUTH ONCE DAILY Strength: 150 mg  Quantity: 90 capsule  Refills: 3             Past medical history, past surgical history, medications, and allergies were reviewed with the patient. Additional pertinent items: None    Family History   Problem Relation Age of Onset    Heart Disease Brother     Hypertension Brother     Asthma Daughter     Heart Disease Brother     Hypertension Brother     C.A.D. Father         MI     Hypertension Father     Lipids Father 50    C.A.D. Paternal Aunt         MI-50s    Cancer Maternal Aunt         Ovarian CA    Cerebrovascular Disease No family hx of     Breast Cancer No family hx of     Cancer - colorectal No family hx of     Prostate Cancer No family hx of        Social History     Tobacco Use    Smoking status: Never    Smokeless tobacco: Never   Substance Use Topics    Alcohol use: Yes     Alcohol/week: 0.0 standard drinks of alcohol     Comment: rare      Social history was reviewed with the patient. Additional pertinent items: None    Allergies   Allergen Reactions    Ivp Dye [Contrast Dye] Anaphylaxis and Hives    Cats Anaphylaxis    Niaspan [Niacin] Rash    Pollen Extract/Tree Extract        Review of Systems  A medically appropriate review of systems was performed with pertinent positives and negatives noted in the HPI, and all other systems negative.    Physical Exam   BP: 121/55  Pulse: 59  Temp: 98.5  F (36.9  C)  Resp: 18  Weight: 95.5 kg (210 lb 9.6 oz)  SpO2: 97 %      Physical Exam  Vitals and nursing note reviewed.   Constitutional:       Comments: Conversant pleasant but in some mild distress secondary to her headache   HENT:      Head: Atraumatic.      Comments: No [place] sign no raccoon eyes  Eyes:      Extraocular Movements: Extraocular  movements intact.      Pupils: Pupils are equal, round, and reactive to light.   Neck:      Comments: Good range of motion and nontender posteriorly  Pulmonary:      Effort: No respiratory distress.   Musculoskeletal:         General: No deformity.   Neurological:      General: No focal deficit present.      Mental Status: She is alert and oriented to person, place, and time.      Cranial Nerves: No cranial nerve deficit.      Motor: No weakness.   Psychiatric:         Mood and Affect: Mood normal.         ADS Course     Orders Placed This Encounter   Procedures    CT Head w/o Contrast    CT Cervical Spine w/o Contrast    Concussion  Referral       Procedures         EXAM: CT HEAD W/O CONTRAST  LOCATION: [facility]  DATE: 7/2/2024    INDICATION: fall backwards  COMPARISON: None.  TECHNIQUE: Routine CT Head without IV contrast. Multiplanar reformats. Dose reduction techniques were used.    FINDINGS:  INTRACRANIAL CONTENTS: Postsurgical changes right-sided cochlear implant. No evidence of acute intracranial hemorrhage or mass effect. Scattered foci of decreased attenuation within the cerebral hemispheric white matter which are nonspecific, though most ...   Impression   IMPRESSION:    1.  No evidence of acute intracranial hemorrhage or mass effect.  2.  Mild nonspecific white matter changes.               Study Result    Narrative & Impression   EXAM: CT CERVICAL SPINE W/O CONTRAST  LOCATION: [facility]  DATE: 7/2/2024     INDICATION: fall backwards  COMPARISON: None.  TECHNIQUE: Routine CT Cervical Spine without IV contrast. Multiplanar reformats. Dose reduction techniques were used.     FINDINGS:  Fracture or subluxation of the cervical spine by CT imaging. Straightening of the normal cervical lordosis. The vertebral bodies of the cervical spine otherwise have normal stature and alignment. Anterior marginal osteophytes at C4/C5 and C5/C6. No   extraspinal  abnormality.      Craniovertebral junction and C1-C2: The odontoid process is well approximated with the anterior body of C1 and well aligned between the lateral masses of C1.     C2-C3: No posterior disc bulge or spinal canal narrowing. Uncovertebral joint disease and facet arthropathy with severe left neural foraminal narrowing. No right neural foraminal narrowing.      C3-C4: No posterior disc bulge or spinal canal narrowing. Uncovertebral joint disease and facet arthropathy with severe left neural foraminal narrowing. No right neural foraminal narrowing.      C4-C5: No posterior disc bulge or spinal canal narrowing. Uncovertebral joint disease and facet arthropathy with severe right and mild left neural foraminal narrowing.      C5-C6: No posterior disc bulge or spinal canal narrowing. Uncovertebral joint disease and facet arthropathy with severe bilateral neural foraminal narrowing.      C6-C7: No posterior disc bulge or spinal canal narrowing. Uncovertebral joint disease and facet arthropathy with severe bilateral neural foraminal narrowing.      C7-T1: No posterior disc bulge or spinal canal narrowing. Uncovertebral joint disease and facet arthropathy with severe bilateral neural foraminal narrowing.                                                                       IMPRESSION:  1.  No evidence of acute fracture or subluxation of the cervical spine by CT imaging.  2.  Degenerative cervical spondylosis as described above.       Critical care was not performed.     Medical Decision Making  The patient's presentation was of moderate complexity (an acute complicated injury).    The patient's evaluation involved:  ordering and/or review of 3+ test(s) in this encounter (see above)    The patient's management necessitated moderate risk (prescription drug management including medications given in the ED) and referral for specialist consultation.      Assessments & Plan (with Medical Decision Making)     I have  reviewed the nursing notes.    At this time patient be placed on the medications below and will be treated like a concussion without significant CT findings.    I have reviewed the findings, diagnosis, plan and need for follow up with the patient.    TOTAL PATIENT CARE TIME INCLUDING DOCUMENTATION, FAMILY COMMUNICATION AND CARE COORDINATION WAS greater than 30 MINUTES.     New Prescriptions    BUTALBITAL-ACETAMINOPHEN-CAFFEINE (ESGIC) -40 MG TABLET    Take 1-2 tablets by mouth every 8 hours as needed for headaches    METHOCARBAMOL (ROBAXIN) 500 MG TABLET    Take 2 tablets (1,000 mg) by mouth 3 times daily for 5 days       Final diagnoses:   Contusion of occipital region of scalp, initial encounter     Follow concussion protocol over the next week by resting your brain and limiting your physical activity    A referral was placed into the computer system for you to be seen in the concussion clinic.  They should call you in the next 24 hours to help you set up an appointment to be seen sometime in the next 1 to 2 weeks.  If they do not call you please call them at [phone]    Fill your prescriptions and take as directed    Follow-up with your own primary care clinic in the next 1 to 2 weeks should you have any concerns    Routine discharge instructions were given for this diagnosis      OSKAR SUBRAMANIAN MD    7/2/2024   [facility]

## 2024-07-26 ENCOUNTER — TELEPHONE (OUTPATIENT)
Dept: FAMILY MEDICINE | Facility: CLINIC | Age: 64
End: 2024-07-26

## 2024-07-26 ENCOUNTER — VIRTUAL VISIT (OUTPATIENT)
Dept: FAMILY MEDICINE | Facility: CLINIC | Age: 64
End: 2024-07-26
Payer: COMMERCIAL

## 2024-07-26 DIAGNOSIS — U07.1 INFECTION DUE TO 2019 NOVEL CORONAVIRUS: Primary | ICD-10-CM

## 2024-07-26 PROCEDURE — 99214 OFFICE O/P EST MOD 30 MIN: CPT | Mod: 95 | Performed by: FAMILY MEDICINE

## 2024-07-26 NOTE — PROGRESS NOTES
Marlyn is a 63 year old who is being evaluated via a billable video visit.    How would you like to obtain your AVS? MyChart  If the video visit is dropped, the invitation should be resent by: Text to cell phone: [phone]  Will anyone else be joining your video visit? No      Assessment & Plan     Infection due to 2019 novel coronavirus  Patient is having fever and general malaise.  Reviewed drug interactions recommend cutting her dose of trazodone down by 50%.  Avoid Valium and avoid statin.  Discussed symptoms to monitor for that would require more urgent evaluation.  Encouraged to follow-up if not improving or new symptoms  - nirmatrelvir and ritonavir (PAXLOVID) 300 mg/100 mg therapy pack  Dispense: 30 tablet; Refill: 0        Subjective   Marlyn is a 63 year old, presenting for the following health issues:  Covid Concern        7/26/2024     3:43 PM   Additional Questions   Roomed by Savannah MCNALLY MA   Accompanied by Self     HPI       COVID-19 Symptom Review  How many days ago did these symptoms start?  Hard to tell when her symptoms started.  Possibly started on Tuesday. Maybe 4 days. Tested positive on 7/25/24    Are any of the following symptoms significant for you?  New or worsening difficulty breathing? No  Worsening cough? No  Fever or chills? Yes, the highest temperature was 100.1  Headache: YES  Sore throat: YES  Chest pain: No  Diarrhea: No  Body aches? YES  Congestions    What treatments has patient tried? Nonsteroidals   Does patient live in a nursing home, group home, or shelter? No  Does patient have a way to get food/medications during quarantined? Yes, I have a friend or family member who can help me.          Review of Systems  Constitutional, HEENT, cardiovascular, pulmonary, gi and gu systems are negative, except as otherwise noted.      Objective           Vitals:  No vitals were obtained today due to virtual visit.    Physical Exam   GENERAL: alert and no distress  EYES: Eyes grossly normal to  inspection.  No discharge or erythema, or obvious scleral/conjunctival abnormalities.  RESP: No audible wheeze, cough, or visible cyanosis.    SKIN: Visible skin clear. No significant rash, abnormal pigmentation or lesions.  NEURO: Cranial nerves grossly intact.  Mentation and speech appropriate for age.  PSYCH: Appropriate affect, tone, and pace of words        Video-Visit Details    Type of service:  Video Visit   Originating Location (pt. Location): Home    Distant Location (provider location):  On-site  Platform used for Video Visit: Keagan  Signed Electronically by: DENIS OMALLEY DO

## 2024-07-26 NOTE — TELEPHONE ENCOUNTER
RN COVID TREATMENT VISIT  07/26/24      The patient has been triaged and does not require a higher level of care.    Kenya Rubalcava  63 year old  Current weight? 210 lbs    Has the patient been seen by a primary care provider at an [facility] or [facility] within the past two years? Yes.   Have you been in close proximity to/do you have a known exposure to a person with a confirmed case of influenza? No.     General treatment eligibility:  Date of positive COVID test (PCR or at home)?  7/25/24    Are you or have you been hospitalized for this COVID-19 infection? No.   Have you received monoclonal antibodies or antiviral treatment for COVID-19 since this positive test? No.   Do you have any of the following conditions that place you at risk of being very sick from COVID-19?   - Age 50 years or older  - Heart conditions such as cardiomyopathies, congenital heart defects, coronary artery disease, heart arrhythmias, heart failure, hypertension, valve disorders   - Overweight or Obesity (BMI >85th percentile or BMI 25 or higher)  Yes, patient has at least one high risk condition as noted above.     Current COVID symptoms:   - fever or chills  - cough  - fatigue  - muscle or body aches  - headache  - sore throat  - congestion or runny nose  Yes. Patient has at least one symptom as selected.     How many days since symptoms started? 5 days or less. Established patient, 12 years or older weighing at least 88.2 lbs, who has symptoms that started in the past 5 days, has not been hospitalized nor received treatment already, and is at risk for being very sick from COVID-19.     Treatment eligibility by RN:  Are you currently pregnant or nursing? No  Do you have a clinically significant hypersensitivity to nirmatrelvir or ritonavir, or toxic epidermal necrolysis (TEN) or Laird-Ayden Syndrome? No  Do you have a history of hepatitis, any hepatic impairment on the Problem List (such as  Child-Benjamin Class C, cirrhosis, fatty liver disease, alcoholic liver disease), or was the last liver lab (hepatic panel, ALT, AST, ALK Phos, bilirubin) elevated in the past 6 months? YES  Do you have any history of severe renal impairment (eGFR < 30mL/min)? No    Is patient eligible to continue? No, patient does not meet all eligibility requirements for the RN COVID treatment (as denoted by yes response(s) above). Patient informed they will need a virtual provider visit to assess treatment options.  Patient will be transferred to a  at the end of this call.     *pt told she had fatty liver from labs in the past. RN could not complete protocol. Pt was scheduled for VV this afternoon.    Tiny Oliveira RN

## 2024-08-05 ENCOUNTER — PATIENT OUTREACH (OUTPATIENT)
Dept: CARE COORDINATION | Facility: CLINIC | Age: 64
End: 2024-08-05
Payer: COMMERCIAL

## 2024-09-16 ENCOUNTER — MYC REFILL (OUTPATIENT)
Dept: FAMILY MEDICINE | Facility: CLINIC | Age: 64
End: 2024-09-16
Payer: COMMERCIAL

## 2024-09-16 DIAGNOSIS — F06.4 ANXIETY DISORDER DUE TO MEDICAL CONDITION: ICD-10-CM

## 2024-09-16 DIAGNOSIS — F41.9 ANXIETY: ICD-10-CM

## 2024-09-17 RX ORDER — VENLAFAXINE HYDROCHLORIDE 150 MG/1
CAPSULE, EXTENDED RELEASE ORAL
Qty: 90 CAPSULE | Refills: 3 | Status: SHIPPED | OUTPATIENT
Start: 2024-09-17

## 2024-09-17 RX ORDER — DIAZEPAM 5 MG
TABLET ORAL
Qty: 45 TABLET | Refills: 5 | Status: SHIPPED | OUTPATIENT
Start: 2024-09-17

## 2024-10-29 ENCOUNTER — TELEPHONE (OUTPATIENT)
Dept: FAMILY MEDICINE | Facility: CLINIC | Age: 64
End: 2024-10-29
Payer: COMMERCIAL

## 2024-10-29 NOTE — TELEPHONE ENCOUNTER
Patient calling with c/o R sided pain for a several weeks. Patient has history of kidney stones and has a stent placed on the R . Patient states it feels like a kidney stone is stuck and requesting imaging to see if it is. Advised patient to be seen in Urgent Care for imaging. Patient voiced understanding.    Jarrett TREJO RN  [facility]

## 2024-10-30 ENCOUNTER — HOSPITAL ENCOUNTER (EMERGENCY)
Facility: CLINIC | Age: 64
Discharge: HOME | End: 2024-10-30
Attending: NURSE PRACTITIONER | Admitting: NURSE PRACTITIONER
Payer: COMMERCIAL

## 2024-10-30 ENCOUNTER — APPOINTMENT (OUTPATIENT)
Dept: CT IMAGING | Facility: CLINIC | Age: 64
End: 2024-10-30
Attending: NURSE PRACTITIONER
Payer: COMMERCIAL

## 2024-10-30 VITALS
BODY MASS INDEX: 34.15 KG/M2 | DIASTOLIC BLOOD PRESSURE: 69 MMHG | SYSTOLIC BLOOD PRESSURE: 130 MMHG | TEMPERATURE: 97.9 F | OXYGEN SATURATION: 97 % | HEIGHT: 64 IN | WEIGHT: 200 LBS | RESPIRATION RATE: 18 BRPM | HEART RATE: 60 BPM

## 2024-10-30 DIAGNOSIS — R10.9 ACUTE RIGHT FLANK PAIN: ICD-10-CM

## 2024-10-30 DIAGNOSIS — M54.50 ACUTE RIGHT-SIDED LOW BACK PAIN WITHOUT SCIATICA: ICD-10-CM

## 2024-10-30 LAB
ALBUMIN UR-MCNC: NEGATIVE MG/DL
ANION GAP SERPL CALCULATED.3IONS-SCNC: 10 MMOL/L (ref 7–15)
APPEARANCE UR: CLEAR
BASOPHILS # BLD AUTO: 0.1 10E3/UL (ref 0–0.2)
BASOPHILS NFR BLD AUTO: 1 %
BILIRUB UR QL STRIP: NEGATIVE
BUN SERPL-MCNC: 15.7 MG/DL (ref 8–23)
CALCIUM SERPL-MCNC: 10.4 MG/DL (ref 8.8–10.4)
CHLORIDE SERPL-SCNC: 101 MMOL/L (ref 98–107)
COLOR UR AUTO: YELLOW
CREAT SERPL-MCNC: 0.66 MG/DL (ref 0.51–0.95)
EGFRCR SERPLBLD CKD-EPI 2021: >90 ML/MIN/1.73M2
EOSINOPHIL # BLD AUTO: 0.2 10E3/UL (ref 0–0.7)
EOSINOPHIL NFR BLD AUTO: 3 %
ERYTHROCYTE [DISTWIDTH] IN BLOOD BY AUTOMATED COUNT: 13 % (ref 10–15)
GLUCOSE SERPL-MCNC: 123 MG/DL (ref 70–99)
GLUCOSE UR STRIP-MCNC: NEGATIVE MG/DL
HCO3 SERPL-SCNC: 29 MMOL/L (ref 22–29)
HCT VFR BLD AUTO: 41.6 % (ref 35–47)
HGB BLD-MCNC: 14.6 G/DL (ref 11.7–15.7)
HGB UR QL STRIP: NEGATIVE
IMM GRANULOCYTES # BLD: 0 10E3/UL
IMM GRANULOCYTES NFR BLD: 0 %
KETONES UR STRIP-MCNC: NEGATIVE MG/DL
LEUKOCYTE ESTERASE UR QL STRIP: NEGATIVE
LYMPHOCYTES # BLD AUTO: 2.4 10E3/UL (ref 0.8–5.3)
LYMPHOCYTES NFR BLD AUTO: 38 %
MCH RBC QN AUTO: 32.2 PG (ref 26.5–33)
MCHC RBC AUTO-ENTMCNC: 35.1 G/DL (ref 31.5–36.5)
MCV RBC AUTO: 92 FL (ref 78–100)
MONOCYTES # BLD AUTO: 0.6 10E3/UL (ref 0–1.3)
MONOCYTES NFR BLD AUTO: 9 %
MUCOUS THREADS #/AREA URNS LPF: PRESENT /LPF
NEUTROPHILS # BLD AUTO: 3.2 10E3/UL (ref 1.6–8.3)
NEUTROPHILS NFR BLD AUTO: 49 %
NITRATE UR QL: NEGATIVE
NRBC # BLD AUTO: 0 10E3/UL
NRBC BLD AUTO-RTO: 0 /100
PH UR STRIP: 7 [PH] (ref 5–7)
PLATELET # BLD AUTO: 183 10E3/UL (ref 150–450)
POTASSIUM SERPL-SCNC: 4 MMOL/L (ref 3.4–5.3)
RBC # BLD AUTO: 4.54 10E6/UL (ref 3.8–5.2)
RBC URINE: 1 /HPF
SODIUM SERPL-SCNC: 140 MMOL/L (ref 135–145)
SP GR UR STRIP: 1.01 (ref 1–1.03)
SQUAMOUS EPITHELIAL: 1 /HPF
UROBILINOGEN UR STRIP-MCNC: 2 MG/DL
WBC # BLD AUTO: 6.5 10E3/UL (ref 4–11)
WBC URINE: <1 /HPF

## 2024-10-30 PROCEDURE — 81001 URINALYSIS AUTO W/SCOPE: CPT | Performed by: NURSE PRACTITIONER

## 2024-10-30 PROCEDURE — 36415 COLL VENOUS BLD VENIPUNCTURE: CPT | Performed by: NURSE PRACTITIONER

## 2024-10-30 PROCEDURE — 96374 THER/PROPH/DIAG INJ IV PUSH: CPT | Performed by: NURSE PRACTITIONER

## 2024-10-30 PROCEDURE — 250N000011 HC RX IP 250 OP 636: Performed by: NURSE PRACTITIONER

## 2024-10-30 PROCEDURE — 80048 BASIC METABOLIC PNL TOTAL CA: CPT | Performed by: NURSE PRACTITIONER

## 2024-10-30 PROCEDURE — 81001 URINALYSIS AUTO W/SCOPE: CPT | Performed by: EMERGENCY MEDICINE

## 2024-10-30 PROCEDURE — 74176 CT ABD & PELVIS W/O CONTRAST: CPT

## 2024-10-30 PROCEDURE — 99284 EMERGENCY DEPT VISIT MOD MDM: CPT | Performed by: NURSE PRACTITIONER

## 2024-10-30 PROCEDURE — 85025 COMPLETE CBC W/AUTO DIFF WBC: CPT | Performed by: NURSE PRACTITIONER

## 2024-10-30 PROCEDURE — 99285 EMERGENCY DEPT VISIT HI MDM: CPT | Mod: 25 | Performed by: NURSE PRACTITIONER

## 2024-10-30 RX ORDER — KETOROLAC TROMETHAMINE 15 MG/ML
15 INJECTION, SOLUTION INTRAMUSCULAR; INTRAVENOUS ONCE
Status: COMPLETED | OUTPATIENT
Start: 2024-10-30 | End: 2024-10-30

## 2024-10-30 RX ADMIN — KETOROLAC TROMETHAMINE 15 MG: 15 INJECTION, SOLUTION INTRAMUSCULAR; INTRAVENOUS at 18:13

## 2024-10-30 ASSESSMENT — COLUMBIA-SUICIDE SEVERITY RATING SCALE - C-SSRS
2. HAVE YOU ACTUALLY HAD ANY THOUGHTS OF KILLING YOURSELF IN THE PAST MONTH?: NO
1. IN THE PAST MONTH, HAVE YOU WISHED YOU WERE DEAD OR WISHED YOU COULD GO TO SLEEP AND NOT WAKE UP?: NO
6. HAVE YOU EVER DONE ANYTHING, STARTED TO DO ANYTHING, OR PREPARED TO DO ANYTHING TO END YOUR LIFE?: NO

## 2024-10-30 ASSESSMENT — ACTIVITIES OF DAILY LIVING (ADL)
ADLS_ACUITY_SCORE: 0
ADLS_ACUITY_SCORE: 0

## 2024-10-30 NOTE — ED PROVIDER NOTES
"  History     Chief Complaint   Patient presents with    Flank Pain     HPI  Kenya Rubalcava is a 64 year old female with past medical history of peripheral vertigo, allergic rhinitis, anxiety, hypertension, psoriasis, hyperlipidemia, kidney stones, ureteral stones-with subsequent ureteral stents, chronic tonsillitis, class II severe obesity, hyperlipidemia who presents emergency room with several week history of worsening right flank pain.     Reports that she has concerns that this is a ureteral stone.  She states in some ways it is very similar to her previous ureteral stones but she states it is atypical because she does not have any \"urinary symptoms\".  She denies fever, chills, sweats, nausea.  She also reports previously she has had ureteral stones and a urinalysis is normal without blood in it.    Also, pt states she fell and injured her back and head on July 2nd and subsequently treated for a concussion.  Pt states she has had ongoing right sided back pain since July 2nd.  She states she has not been evaluated for this back pain.  She does agree and admit that it is possible that her pain in her right flank right back area is from this back pain but she feels it is ureteral stones.    She states the pain is sharp and piercing, aggravating symptoms include getting up and moving.  Currently pain is 8/10.  She has not tried any therapies or treatments today.    Allergies:  Allergies   Allergen Reactions    Ivp Dye [Contrast Dye] Anaphylaxis and Hives    Cats Anaphylaxis    Niaspan [Niacin] Rash    Pollen Extract/Tree Extract        Problem List:    Patient Active Problem List    Diagnosis Date Noted    Class 2 severe obesity due to excess calories with serious comorbidity in adult (H) 12/21/2023     Priority: Medium    Chronic pansinusitis 03/29/2022     Priority: Medium     Added automatically from request for surgery [id number]      Chronic tonsillitis 03/29/2022     Priority: Medium     Added automatically " "from request for surgery [id number]      Primary insomnia 07/19/2019     Priority: Medium    Anxiety 05/14/2015     Priority: Medium    Meniere disease 11/11/2010     Priority: Medium     Diagnosed 10/10 - active in left ear. Trying dyazide daily, serax twice daily, meclizine qid       Kidney stones 06/15/2009     Priority: Medium     Bilateral noted on CT 5/23/09      Hypertriglyceridemia 06/15/2009     Priority: Medium    Hyperlipidemia LDL goal <130 06/30/2008     Priority: Medium     Recent Labs   Lab Test 11/26/10 0847 7/1/10 0927    CHOL 188 164    HDL 28* 27*    LDL Cannot estimate LDL when triglyceride exceeds 400 mg/dL93 Cannot estimate LDL when triglyceride exceeds 400 mg/dL    TRIG 443* 431*    CHOLHDLRATIO 7.0* 6.0*     11/29/10 - Just added fenofibrate and recheck in 2 months lipid/ast       Psoriasis 05/22/2008     Priority: Medium     Less than 1% body surface area - 1% ointment Triamcinalone, scalp Head&Shoulders or Nizoral [place] prescriptions.      Symptomatic menopausal or female climacteric states 06/25/2007     Priority: Medium    Peripheral vertigo 09/06/2006     Priority: Medium     Problem list name updated by automated process. Provider to review      Essential hypertension, benign 05/02/2006     Priority: Medium    Anxiety disorder due to medical condition      Priority: Medium     Treatment tried:  Buspar 7.5 mg bid (stopped 03/21/2005), Celexa started 03/21/2005    Problem list name updated by automated process. Provider to review      Allergic rhinitis 06/16/2004     Priority: Medium     Environmental allergies  Problem list name updated by automated process. Provider to review      Sensorineural hearing loss 07/05/2002     Priority: Medium     Metal \"Esteem\" implant in the right posterior head  - placed approximately 2011    Referred to Dr Street, [facility] by previous clinic  Problem list name updated by automated process. Provider to review            Past Medical " History:    Past Medical History:   Diagnosis Date    Abnormal Papanicolaou smear of cervix and cervical HPV 1986    Anxiety     Anxiety disorder in conditions classified elsewhere     Calculus of kidney 08/11/1988    Calculus of ureter 06/30/1985    Excessive or frequent menstruation 07/07/2003    Labyrinthitis, unspecified     Lump or mass in breast 10/27/1999    Variants of migraine, not elsewhere classified, without mention of intractable migraine without mention of status migrainosus 02/07/2002       Past Surgical History:    Past Surgical History:   Procedure Laterality Date    CARPAL TUNNEL RELEASE RT/LT  08/01/2008    Left     COLONOSCOPY  08/07/2012    Procedure: COLONOSCOPY;  Colonoscopy;  Surgeon: Kailyn Lopez MD;  Location: [facility]    COLONOSCOPY N/A 09/21/2022    Procedure: COLONOSCOPY;  Surgeon: Denton Montilla DO;  Location: [facility]    CYSTOSCOPY, RETROGRADES, INSERT STENT URETER(S), COMBINED Left 08/19/2016    Procedure: COMBINED CYSTOSCOPY, RETROGRADES, INSERT STENT URETER(S);  Surgeon: Terence Carpenter MD;  Location:  OR    esteem implant  06/23/2011    Hearing implant right ear.    HYSTERECTOMY, PAP NO LONGER INDICATED  12/01/2004    Vaginal hysterectomy    LAPAROSCOPIC ABLATION ENDOMETRIOSIS  07/01/2004    LASER HOLMIUM LITHOTRIPSY URETER(S), INSERT STENT, COMBINED Left 09/12/2016    Procedure: COMBINED CYSTOSCOPY, URETEROSCOPY, LASER HOLMIUM LITHOTRIPSY URETER(S), INSERT STENT;  Surgeon: Kailyn Dozier MD;  Location: [facility]    LEFT wrist surgery      OPTICAL TRACKING SYSTEM ENDOSCOPIC SINUS SURGERY Bilateral 05/12/2022    Procedure: IMAGE GUIDED FUNCTIONAL BILATERAL  ENDOSCOPIC SINUS SURGERY (FESS) WITHOUT SEPTOPLASTY;  Surgeon: Dane Lowery MD;  Location: [facility]    Sinus surgery with repair of deviated septum      TONSILLECTOMY, ADENOIDECTOMY ADULT, COMBINED Bilateral 05/12/2022    Procedure: BILATERAL TONSILLECTOMY;  Surgeon: Dane Lowery  MD;  Location: [facility]    TUBAL LIGATION         Family History:    Family History   Problem Relation Age of Onset    Heart Disease Brother     Hypertension Brother     Asthma Daughter     Heart Disease Brother     Hypertension Brother     C.A.D. Father         MI     Hypertension Father     Lipids Father 50    C.A.D. Paternal Aunt         MI-50s    Cancer Maternal Aunt         Ovarian CA    Cerebrovascular Disease No family hx of     Breast Cancer No family hx of     Cancer - colorectal No family hx of     Prostate Cancer No family hx of        Social History:  Marital Status:  Single [1]  Social History     Tobacco Use    Smoking status: Never     Passive exposure: Never    Smokeless tobacco: Never   Vaping Use    Vaping status: Never Used   Substance Use Topics    Alcohol use: Yes     Alcohol/week: 0.0 standard drinks of alcohol     Comment: rare     Drug use: No        Medications:    allopurinol (ZYLOPRIM) 300 MG tablet  ASPIRIN 325 MG OR TBEC  atenolol (TENORMIN) 50 MG tablet  betamethasone dipropionate (DIPROSONE) 0.05 % external ointment  budesonide (PULMICORT) 0.5 MG/2ML neb solution  butalbital-acetaminophen-caffeine (ESGIC) -40 MG tablet  diazepam (VALIUM) 5 MG tablet  DIPHENHYDRAMINE HCL PO  fluocinonide (LIDEX) 0.05 % external solution  fluticasone (FLONASE) 50 MCG/ACT nasal spray  hydrOXYzine (VISTARIL) 50 MG capsule  indomethacin (INDOCIN) 50 MG capsule  levocetirizine (XYZAL) 5 MG tablet  meclizine (ANTIVERT) 25 MG tablet  metroNIDAZOLE (METROGEL) 0.75 % external gel  polymixin b-trimethoprim (POLYTRIM) 55651-9.1 UNIT/ML-% ophthalmic solution  pravastatin (PRAVACHOL) 40 MG tablet  prochlorperazine (COMPAZINE) 10 MG tablet  tiZANidine (ZANAFLEX) 4 MG tablet  traZODone (DESYREL) 100 MG tablet  triamterene-HCTZ (DYAZIDE) 37.5-25 MG capsule  venlafaxine (EFFEXOR XR) 150 MG 24 hr capsule          Review of Systems  As mentioned above in the history present illness. All other systems were reviewed and  "are negative.    Physical Exam   BP: 130/69  Pulse: 60  Temp: 97.9  F (36.6  C)  Resp: 18  Height: 162.6 cm (5' 4\")  Weight: 90.7 kg (200 lb)  SpO2: 97 %      Physical Exam  Vitals and nursing note reviewed.   Constitutional:       General: She is in acute distress.      Appearance: She is well-developed. She is not ill-appearing, toxic-appearing or diaphoretic.   HENT:      Head: Normocephalic and atraumatic.      Right Ear: External ear normal.      Left Ear: External ear normal.   Eyes:      General:         Right eye: No discharge.         Left eye: No discharge.      Conjunctiva/sclera: Conjunctivae normal.   Cardiovascular:      Rate and Rhythm: Normal rate and regular rhythm.      Heart sounds: Normal heart sounds. No murmur heard.     No friction rub.   Pulmonary:      Effort: Pulmonary effort is normal. No respiratory distress.      Breath sounds: Normal breath sounds. No stridor. No wheezing or rales.   Chest:      Chest wall: No tenderness.   Musculoskeletal:      Lumbar back: Tenderness (right sided lumbar pain without nodules, masses, erythema, warmth) present. No swelling, edema, deformity, lacerations, spasms or bony tenderness. Normal range of motion.   Skin:     General: Skin is warm and dry.      Coloration: Skin is not pale.      Findings: No erythema or rash.   Neurological:      Mental Status: She is alert.         ED Course     ED Course as of 10/30/24 1950   Wed Oct 30, 2024   1839 CBC with platelets differential  CBC reveals no acute leukocytosis and normal hemoglobin   1911 Basic metabolic panel(!)  unremarkable   1911 Pt reports pain level is currently 1/10   1938 CT Abdomen Pelvis w/o Contrast  IMPRESSION:      1.  No acute abnormality, within the limitations of the noncontrast technique.     2.  Bilateral nonobstructing intrarenal calculi measuring up to 3 mm on the left. No ureteral calculi or hydronephrosis.     3.  Cholelithiasis.       Procedures    Results for orders placed or " performed during the hospital encounter of 10/30/24 (from the past 24 hours)   UA with Microscopic reflex to Culture    Specimen: Urine, Midstream   Result Value Ref Range    Color Urine Yellow Colorless, Straw, Light Yellow, Yellow    Appearance Urine Clear Clear    Glucose Urine Negative Negative mg/dL    Bilirubin Urine Negative Negative    Ketones Urine Negative Negative mg/dL    Specific Gravity Urine 1.011 1.003 - 1.035    Blood Urine Negative Negative    pH Urine 7.0 5.0 - 7.0    Protein Albumin Urine Negative Negative mg/dL    Urobilinogen Urine 2.0 Normal, 2.0 mg/dL    Nitrite Urine Negative Negative    Leukocyte Esterase Urine Negative Negative    Mucus Urine Present (A) None Seen /LPF    RBC Urine 1 <=2 /HPF    WBC Urine <1 <=5 /HPF    Squamous Epithelials Urine 1 <=1 /HPF    Narrative    Urine Culture not indicated   CBC with platelets differential    Narrative    The following orders were created for panel order CBC with platelets differential.  Procedure                               Abnormality         Status                     ---------                               -----------         ------                     CBC with platelets and d...[[id number]]                      Final result                 Please view results for these tests on the individual orders.   Basic metabolic panel   Result Value Ref Range    Sodium 140 135 - 145 mmol/L    Potassium 4.0 3.4 - 5.3 mmol/L    Chloride 101 98 - 107 mmol/L    Carbon Dioxide (CO2) 29 22 - 29 mmol/L    Anion Gap 10 7 - 15 mmol/L    Urea Nitrogen 15.7 8.0 - 23.0 mg/dL    Creatinine 0.66 0.51 - 0.95 mg/dL    GFR Estimate >90 >60 mL/min/1.73m2    Calcium 10.4 8.8 - 10.4 mg/dL    Glucose 123 (H) 70 - 99 mg/dL   CBC with platelets and differential   Result Value Ref Range    WBC Count 6.5 4.0 - 11.0 10e3/uL    RBC Count 4.54 3.80 - 5.20 10e6/uL    Hemoglobin 14.6 11.7 - 15.7 g/dL    Hematocrit 41.6 35.0 - 47.0 %    MCV 92 78 - 100 fL    MCH 32.2 26.5 - 33.0  pg    MCHC 35.1 31.5 - 36.5 g/dL    RDW 13.0 10.0 - 15.0 %    Platelet Count 183 150 - 450 10e3/uL    % Neutrophils 49 %    % Lymphocytes 38 %    % Monocytes 9 %    % Eosinophils 3 %    % Basophils 1 %    % Immature Granulocytes 0 %    NRBCs per 100 WBC 0 <1 /100    Absolute Neutrophils 3.2 1.6 - 8.3 10e3/uL    Absolute Lymphocytes 2.4 0.8 - 5.3 10e3/uL    Absolute Monocytes 0.6 0.0 - 1.3 10e3/uL    Absolute Eosinophils 0.2 0.0 - 0.7 10e3/uL    Absolute Basophils 0.1 0.0 - 0.2 10e3/uL    Absolute Immature Granulocytes 0.0 <=0.4 10e3/uL    Absolute NRBCs 0.0 10e3/uL   CT Abdomen Pelvis w/o Contrast    Narrative    EXAM: CT ABDOMEN PELVIS W/O CONTRAST  LOCATION: [facility]  DATE: 10/30/2024    INDICATION: Right flank pain.  COMPARISON: MR pancreas 3/18/2024.  TECHNIQUE: CT scan of the abdomen and pelvis was performed without IV contrast. Multiplanar reformats were obtained. Dose reduction techniques were used.  CONTRAST: None.    FINDINGS:     LOWER CHEST: Bibasilar atelectasis.    HEPATOBILIARY: Morphologic changes of chronic liver disease. Small gallstone. No biliary ductal dilation.    PANCREAS: Multiple small cystic lesions within the pancreas have likely not significantly changed but are not well visualized on this noncontrast exam. No peripancreatic inflammation.    SPLEEN: Normal.    ADRENAL GLANDS: Normal.    KIDNEYS/BLADDER: Three nonobstructing right renal calculi measuring up to 2 mm at the inferior pole. Two nonobstructing left renal calculi measuring up to 3 mm at the inferior pole. No ureteral calculi or hydronephrosis. Normal bladder. No intraluminal   bladder calculi.    BOWEL: No bowel obstruction or inflammation. Appendix is not visualized; no inflammation at the base of the cecum.    LYMPH NODES: No enlarged lymph nodes.    VASCULATURE: Mild aortic atherosclerosis. No abdominal aortic aneurysm.    PELVIC ORGANS: Absent uterus.    MUSCULOSKELETAL: Tiny fat-containing  periumbilical hernia. Multilevel degenerative changes of the spine. No acute bony abnormality or destructive bone lesions.      Impression    IMPRESSION:     1.  No acute abnormality, within the limitations of the noncontrast technique.    2.  Bilateral nonobstructing intrarenal calculi measuring up to 3 mm on the left. No ureteral calculi or hydronephrosis.    3.  Cholelithiasis.       Medications   ketorolac (TORADOL) injection 15 mg (15 mg Intravenous $Given 10/30/24 1813)       Assessments & Plan (with Medical Decision Making)     I have reviewed the nursing notes.    I have reviewed the findings, diagnosis, plan and need for follow up with the patient.  Kenya Rubalcava is a 64 year old female with past medical history of peripheral vertigo, allergic rhinitis, anxiety, hypertension, psoriasis, hyperlipidemia, kidney stones, ureteral stones-with subsequent ureteral stents, chronic tonsillitis, class II severe obesity, hyperlipidemia who presents emergency room with several week history of worsening right flank pain.  Patient denies urinary symptoms, fever, chills, sweats, nausea.  She is rating her pain 6 out of 10 currently.  On exam patient appears to be in acute distress, blood pressure 130/69, temp 97.9, pulse 60, respiratory rate is 18, O2 saturation 97%.  Patient has some right flank pain and specifically right lumbar pain without bony tenderness; nodules and masses are absent.  Patient has no signs of cauda equina and no loss of bowel or bladder function.  Differential diagnoses ongoing right low back pain secondary to fall that occurred on July 2 and given the history of aggravating symptoms worsened by movement unlikely to be spinal stenosis.  Other differential diagnoses she has ureteral stone process with or without DIMPLE and low suspicion of infected ureteral stone.    Results consistent with ongoing acute on chronic right-sided low back pain without a ureteral stone without infection without DIMPLE.   Reviewed all these findings with patient.  Offered spine referral and this was declined.  Discussed muscle relaxers and patient agreeable and requested a muscle relaxer that was sent to [facility] in [place].  Discussed that this would not be available today and offered to pharmacy here and she declined.  She states she will follow-up with her primary care provider.  Patient has further needs and is discharged in stable condition.    New Prescriptions    TIZANIDINE (ZANAFLEX) 4 MG TABLET    Take 1 tablet (4 mg) by mouth 3 times daily as needed for muscle spasms.       Final diagnoses:   Acute right flank pain   Acute right-sided low back pain without sciatica       10/30/2024   [facility] EMERGENCY DEPT       Evelyne Herrera, SUSY CNP  10/30/24 1950

## 2024-10-30 NOTE — ED TRIAGE NOTES
Reports right flank pain that has been intermittent for a few weeks. Has previously had stents following kidney stones. Denies urinary symptoms.      Triage Assessment (Adult)       Row Name 10/30/24 1544          Triage Assessment    Airway WDL WDL        Respiratory WDL    Respiratory WDL WDL        Skin Circulation/Temperature WDL    Skin Circulation/Temperature WDL WDL        Cardiac WDL    Cardiac WDL WDL        Peripheral/Neurovascular WDL    Peripheral Neurovascular WDL WDL        Cognitive/Neuro/Behavioral WDL    Cognitive/Neuro/Behavioral WDL WDL

## 2024-10-31 NOTE — DISCHARGE INSTRUCTIONS
You were seen emergency room for concern for possible ureteral stone with right flank pain and right sided low back pain.  Our workup shows normal kidney function no signs of urinary tract infection and no signs of a ureteral stone.  I suspect that this is related to back pain.  We discussed muscle relaxers.  I have prescribed tizanidine and you can take 4 mg up to 3 times daily as needed it may cause drowsiness.  You may just wish to use it at night for sleep.

## 2024-11-24 DIAGNOSIS — I10 ESSENTIAL HYPERTENSION, BENIGN: ICD-10-CM

## 2024-11-25 RX ORDER — ATENOLOL 50 MG/1
50 TABLET ORAL DAILY
Qty: 90 TABLET | Refills: 2 | Status: SHIPPED | OUTPATIENT
Start: 2024-11-25

## 2024-12-07 ENCOUNTER — MYC REFILL (OUTPATIENT)
Dept: FAMILY MEDICINE | Facility: CLINIC | Age: 64
End: 2024-12-07
Payer: COMMERCIAL

## 2024-12-07 DIAGNOSIS — F51.01 PRIMARY INSOMNIA: ICD-10-CM

## 2024-12-07 DIAGNOSIS — F41.9 ANXIETY: ICD-10-CM

## 2024-12-08 ENCOUNTER — HOSPITAL ENCOUNTER (EMERGENCY)
Facility: CLINIC | Age: 64
Discharge: HOME | End: 2024-12-08
Payer: COMMERCIAL

## 2024-12-08 ENCOUNTER — APPOINTMENT (OUTPATIENT)
Dept: GENERAL RADIOLOGY | Facility: CLINIC | Age: 64
End: 2024-12-08
Payer: COMMERCIAL

## 2024-12-08 VITALS
TEMPERATURE: 97.6 F | HEART RATE: 65 BPM | SYSTOLIC BLOOD PRESSURE: 139 MMHG | RESPIRATION RATE: 20 BRPM | DIASTOLIC BLOOD PRESSURE: 63 MMHG | OXYGEN SATURATION: 94 %

## 2024-12-08 DIAGNOSIS — J06.9 URI WITH COUGH AND CONGESTION: ICD-10-CM

## 2024-12-08 DIAGNOSIS — J20.9 ACUTE BRONCHITIS: ICD-10-CM

## 2024-12-08 LAB
FLUAV RNA SPEC QL NAA+PROBE: NEGATIVE
FLUBV RNA RESP QL NAA+PROBE: NEGATIVE
RSV RNA SPEC NAA+PROBE: NEGATIVE
SARS-COV-2 RNA RESP QL NAA+PROBE: NEGATIVE

## 2024-12-08 PROCEDURE — 71046 X-RAY EXAM CHEST 2 VIEWS: CPT

## 2024-12-08 PROCEDURE — G0463 HOSPITAL OUTPT CLINIC VISIT: HCPCS | Mod: 25

## 2024-12-08 PROCEDURE — 87637 SARSCOV2&INF A&B&RSV AMP PRB: CPT

## 2024-12-08 PROCEDURE — 99213 OFFICE O/P EST LOW 20 MIN: CPT

## 2024-12-08 RX ORDER — PREDNISONE 20 MG/1
TABLET ORAL
Qty: 10 TABLET | Refills: 0 | Status: SHIPPED | OUTPATIENT
Start: 2024-12-08

## 2024-12-08 RX ORDER — BENZONATATE 100 MG/1
100 CAPSULE ORAL 3 TIMES DAILY PRN
Qty: 15 CAPSULE | Refills: 0 | Status: SHIPPED | OUTPATIENT
Start: 2024-12-08

## 2024-12-08 ASSESSMENT — ACTIVITIES OF DAILY LIVING (ADL)
ADLS_ACUITY_SCORE: 41
ADLS_ACUITY_SCORE: 41

## 2024-12-08 NOTE — DISCHARGE INSTRUCTIONS
For cough:  -Prednisone once a day for 5 days. I recommend taking this in the morning as it can cause you to feel more awake/activated and can keep you up at night.  -Mucinex DM (contains guaifenesin and dextromethorphan) as needed for thinning secretions and suppressing cough  -Tessalon Perles as needed  -Humidified air, increased fluid intake, honey with tea, cough drops    For congestion/post-nasal drip:  -Flonase nasal spray, 1 spray in each nostril daily (can increase to 2 sprays in each nostril).  This medication needs to be used every day in order for it to work.  It takes about 3-5 days start working.  You can take it for as long as needed.  -Daily Zyrtec (or another antihistamine such as Allegra, Claritin)  -Nasal saline rinses such as Neti pot    Schedule a follow-up appointment with your primary care provider if your symptoms are persistent and not improving.  Return to the ER immediately if you develop worsening cough with shortness of breath, chest pain, fever, or if other concerning symptoms develop.

## 2024-12-08 NOTE — ED PROVIDER NOTES
History     Chief Complaint   Patient presents with    Cough     Cough x's 1 week   Pain with breathing , chest feels tight , fatigue        Kenya EWA Rubalcava is a 64 year old female with significant pmhx of  HLD, psoriasis, chronic pansinusitis, meniere's, HTN who presents for evaluation of cough.  Patient states she developed a cough and congestion 1 week ago that have failed to improve.  Over the past few days she has been feeling more chest tightness and fatigue.  The cough is worse in the morning when she coughs up all the drainage from overnight.  The cough is also been keeping her awake at nighttime.  She has been taking NyQuil which helps relieve some of her symptoms.  She denies associated headache, ear pain, sinus pressure pain, sore throat, difficulty breathing, nausea/vomiting, abdominal pain.    Allergies:  Allergies   Allergen Reactions    Ivp Dye [Contrast Dye] Anaphylaxis and Hives    Cats Anaphylaxis    Niaspan [Niacin] Rash    Pollen Extract/Tree Extract        Problem List:    Patient Active Problem List    Diagnosis Date Noted    Class 2 severe obesity due to excess calories with serious comorbidity in adult (H) 12/21/2023     Priority: Medium    Chronic pansinusitis 03/29/2022     Priority: Medium     Added automatically from request for surgery [id number]      Chronic tonsillitis 03/29/2022     Priority: Medium     Added automatically from request for surgery [id number]      Primary insomnia 07/19/2019     Priority: Medium    Anxiety 05/14/2015     Priority: Medium    Meniere disease 11/11/2010     Priority: Medium     Diagnosed 10/10 - active in left ear. Trying dyazide daily, serax twice daily, meclizine qid       Kidney stones 06/15/2009     Priority: Medium     Bilateral noted on CT 5/23/09      Hypertriglyceridemia 06/15/2009     Priority: Medium    Hyperlipidemia LDL goal <130 06/30/2008     Priority: Medium     Recent Labs   Lab Test 11/26/10 0847 7/1/10 0927    CHOL 188 164    HDL 28* 27*     "LDL Cannot estimate LDL when triglyceride exceeds 400 mg/dL93 Cannot estimate LDL when triglyceride exceeds 400 mg/dL    TRIG 443* 431*    CHOLHDLRATIO 7.0* 6.0*     11/29/10 - Just added fenofibrate and recheck in 2 months lipid/ast       Psoriasis 05/22/2008     Priority: Medium     Less than 1% body surface area - 1% ointment Triamcinalone, scalp Head&Shoulders or Nizoral Tekoa-Smoothe FS prescriptions.      Symptomatic menopausal or female climacteric states 06/25/2007     Priority: Medium    Peripheral vertigo 09/06/2006     Priority: Medium     Problem list name updated by automated process. Provider to review      Essential hypertension, benign 05/02/2006     Priority: Medium    Anxiety disorder due to medical condition      Priority: Medium     Treatment tried:  Buspar 7.5 mg bid (stopped 03/21/2005), Celexa started 03/21/2005    Problem list name updated by automated process. Provider to review      Allergic rhinitis 06/16/2004     Priority: Medium     Environmental allergies  Problem list name updated by automated process. Provider to review      Sensorineural hearing loss 07/05/2002     Priority: Medium     Metal \"Esteem\" implant in the right posterior head  - placed approximately 2011    Referred to Dr Street, [facility] by previous clinic  Problem list name updated by automated process. Provider to review            Past Medical History:    Past Medical History:   Diagnosis Date    Abnormal Papanicolaou smear of cervix and cervical HPV 1986    Anxiety     Anxiety disorder in conditions classified elsewhere     Calculus of kidney 08/11/1988    Calculus of ureter 06/30/1985    Excessive or frequent menstruation 07/07/2003    Labyrinthitis, unspecified     Lump or mass in breast 10/27/1999    Variants of migraine, not elsewhere classified, without mention of intractable migraine without mention of status migrainosus 02/07/2002       Past Surgical History:    Past Surgical History:   Procedure " Laterality Date    CARPAL TUNNEL RELEASE RT/LT  08/01/2008    Left     COLONOSCOPY  08/07/2012    Procedure: COLONOSCOPY;  Colonoscopy;  Surgeon: Kailyn Lopez MD;  Location: [facility]    COLONOSCOPY N/A 09/21/2022    Procedure: COLONOSCOPY;  Surgeon: Denton Montilla DO;  Location: [facility]    CYSTOSCOPY, RETROGRADES, INSERT STENT URETER(S), COMBINED Left 08/19/2016    Procedure: COMBINED CYSTOSCOPY, RETROGRADES, INSERT STENT URETER(S);  Surgeon: Terence Carpenter MD;  Location: [facility]    esteem implant  06/23/2011    Hearing implant right ear.    HYSTERECTOMY, PAP NO LONGER INDICATED  12/01/2004    Vaginal hysterectomy    LAPAROSCOPIC ABLATION ENDOMETRIOSIS  07/01/2004    LASER HOLMIUM LITHOTRIPSY URETER(S), INSERT STENT, COMBINED Left 09/12/2016    Procedure: COMBINED CYSTOSCOPY, URETEROSCOPY, LASER HOLMIUM LITHOTRIPSY URETER(S), INSERT STENT;  Surgeon: Kailyn Dozier MD;  Location:  OR    LEFT wrist surgery      OPTICAL TRACKING SYSTEM ENDOSCOPIC SINUS SURGERY Bilateral 05/12/2022    Procedure: IMAGE GUIDED FUNCTIONAL BILATERAL  ENDOSCOPIC SINUS SURGERY (FESS) WITHOUT SEPTOPLASTY;  Surgeon: Dane Lowery MD;  Location: [facility]    Sinus surgery with repair of deviated septum      TONSILLECTOMY, ADENOIDECTOMY ADULT, COMBINED Bilateral 05/12/2022    Procedure: BILATERAL TONSILLECTOMY;  Surgeon: Dane Lowery MD;  Location: [facility]    TUBAL LIGATION         Family History:    Family History   Problem Relation Age of Onset    Heart Disease Brother     Hypertension Brother     Asthma Daughter     Heart Disease Brother     Hypertension Brother     C.A.D. Father         MI     Hypertension Father     Lipids Father 50    C.A.D. Paternal Aunt         MI-50s    Cancer Maternal Aunt         Ovarian CA    Cerebrovascular Disease No family hx of     Breast Cancer No family hx of     Cancer - colorectal No family hx of     Prostate Cancer No family hx of        Social  History:  Marital Status:  Single [1]  Social History     Tobacco Use    Smoking status: Never     Passive exposure: Never    Smokeless tobacco: Never   Vaping Use    Vaping status: Never Used   Substance Use Topics    Alcohol use: Yes     Alcohol/week: 0.0 standard drinks of alcohol     Comment: rare     Drug use: No        Medications:    allopurinol (ZYLOPRIM) 300 MG tablet  ASPIRIN 325 MG OR TBEC  atenolol (TENORMIN) 50 MG tablet  atenolol (TENORMIN) 50 MG tablet  benzonatate (TESSALON) 100 MG capsule  betamethasone dipropionate (DIPROSONE) 0.05 % external ointment  budesonide (PULMICORT) 0.5 MG/2ML neb solution  butalbital-acetaminophen-caffeine (ESGIC) -40 MG tablet  diazepam (VALIUM) 5 MG tablet  DIPHENHYDRAMINE HCL PO  fluocinonide (LIDEX) 0.05 % external solution  fluticasone (FLONASE) 50 MCG/ACT nasal spray  hydrOXYzine (VISTARIL) 50 MG capsule  indomethacin (INDOCIN) 50 MG capsule  levocetirizine (XYZAL) 5 MG tablet  meclizine (ANTIVERT) 25 MG tablet  metroNIDAZOLE (METROGEL) 0.75 % external gel  polymixin b-trimethoprim (POLYTRIM) 97989-8.1 UNIT/ML-% ophthalmic solution  pravastatin (PRAVACHOL) 40 MG tablet  predniSONE (DELTASONE) 20 MG tablet  prochlorperazine (COMPAZINE) 10 MG tablet  tiZANidine (ZANAFLEX) 4 MG tablet  traZODone (DESYREL) 100 MG tablet  triamterene-HCTZ (DYAZIDE) 37.5-25 MG capsule  venlafaxine (EFFEXOR XR) 150 MG 24 hr capsule          Physical Exam   BP: 139/63  Pulse: 65  Temp: 97.6  F (36.4  C)  Resp: 20  SpO2: 94 %      Physical Exam  Vitals and nursing note reviewed.   Constitutional:       General: She is not in acute distress.     Appearance: She is ill-appearing. She is not toxic-appearing or diaphoretic.   HENT:      Head: Normocephalic and atraumatic.      Right Ear: Tympanic membrane normal.      Left Ear: Tympanic membrane normal.      Nose: Congestion present.      Mouth/Throat:      Mouth: Mucous membranes are moist.      Pharynx: Oropharynx is clear. No  oropharyngeal exudate or posterior oropharyngeal erythema.   Eyes:      Extraocular Movements: Extraocular movements intact.      Pupils: Pupils are equal, round, and reactive to light.   Cardiovascular:      Rate and Rhythm: Normal rate and regular rhythm.      Heart sounds: Normal heart sounds.   Pulmonary:      Effort: Pulmonary effort is normal.      Breath sounds: Normal breath sounds. No wheezing, rhonchi or rales.   Musculoskeletal:         General: Normal range of motion.      Cervical back: Normal range of motion.   Neurological:      General: No focal deficit present.      Mental Status: She is alert and oriented to person, place, and time.   Psychiatric:         Mood and Affect: Mood normal.         Behavior: Behavior normal.         ED Course        Procedures                    Results for orders placed or performed during the hospital encounter of 12/08/24 (from the past 24 hours)   XR Chest 2 Views    Narrative    EXAM: XR CHEST 2 VIEWS  LOCATION: [facility]  DATE: 12/8/2024    INDICATION: cough x1 week  COMPARISON: 12/19/2008      Impression    IMPRESSION: Minimal scarring in the left midlung. The lungs are otherwise clear. Normal heart size and pulmonary vascularity. No pleural effusions.   Influenza A/B, RSV and SARS-CoV2 PCR (COVID-19) Nasopharyngeal    Specimen: Nasopharyngeal; Swab   Result Value Ref Range    Influenza A PCR Negative Negative    Influenza B PCR Negative Negative    RSV PCR Negative Negative    SARS CoV2 PCR Negative Negative    Narrative    Testing was performed using the Xpert Xpress CoV2/Flu/RSV Assay on the [employer] GeneXpert Instrument. This test should be ordered for the detection of SARS-CoV2, influenza, and RSV viruses in individuals with signs and symptoms of respiratory tract infection. This test is for in vitro diagnostic use under the [place] FDA for laboratories certified under CLIA to perform high or moderate complexity testing. This test has  been [place] FDA cleared. A negative result does not rule out the presence of PCR inhibitors in the specimen or target RNA in concentration below the limit of detection for the assay. If only one viral target is positive but coinfection with multiple targets is suspected, the sample should be re-tested with another FDA cleared, approved, or authorized test, if coninfection would change clinical management. This test was validated by the [facility]. These laboratories are certified under the Clinical Laboratory Improvement Amendments of 1988 (CLIA-88) as qualified to perfom high complexity laboratory testing.       Medications - No data to display    Assessments & Plan (with Medical Decision Making)     I have reviewed the nursing notes.    I have reviewed the findings, diagnosis, plan and need for follow up with the patient.    Medical Decision Making  Kenay Rubalcava is a 64 year old female with significant pmhx of HLD, psoriasis, chronic pansinusitis, meniere's, HTN who presents for evaluation of  cough.  Differential diagnoses include COVID, influenza, other viral respiratory infection, bronchitis, pneumonia.  Vital signs within normal limits.  Patient is normotensive, afebrile, she is not tachycardic, and she is satting 94% on room air with a regular respiratory rate and effort.    On examination patient is mildly ill-appearing but alert, nontoxic, no acute distress.  She is breathing comfortably on room air and able to speak in full sentences without difficulty.  Bilateral ear exam negative for signs of infection, oropharyngeal exam negative for erythema, exudates, swelling.  Lungs are clear to auscultation bilaterally, heart sounds are normal.  Nose is noted to be congested.  Influenza, COVID, and RSV testing was negative.  Chest x-ray negative for acute cardiopulmonary abnormality.    Suspect symptoms are due to acute viral bronchitis.  Recommended treatment with a 5-day course of  prednisone.  Patient declined albuterol inhaler.  We discussed that antibiotics are not indicated for acute bronchitis as it is usually viral and over prescribing of antibiotics for bronchitis is led to antibiotic resistance and negative side effects for patients.  Recommended cough treatment with Tessalon Perles, Mucinex DM, honey with tea, increase fluids.  She was instructed to follow-up in clinic if her symptoms have not improved over the next 5 days, or to return to the ER immediately she develops chest pain, difficulty breathing, vomiting, fever, or if other concerning symptoms develop.  Patient voiced understanding of the plan and had no further questions.      New Prescriptions    BENZONATATE (TESSALON) 100 MG CAPSULE    Take 1 capsule (100 mg) by mouth 3 times daily as needed for cough.    PREDNISONE (DELTASONE) 20 MG TABLET    Take two tablets (= 40mg) each day for 5 (five) days       Final diagnoses:   URI with cough and congestion   Acute bronchitis       Robyn Steen PA-C  December 8, 2024  [facility] EMERGENCY DEPT     Robyn Steen PA-C  12/08/24 [id number]

## 2024-12-09 RX ORDER — TRAZODONE HYDROCHLORIDE 100 MG/1
TABLET ORAL
Qty: 180 TABLET | Refills: 0 | Status: SHIPPED | OUTPATIENT
Start: 2024-12-09

## 2024-12-09 RX ORDER — VENLAFAXINE HYDROCHLORIDE 150 MG/1
CAPSULE, EXTENDED RELEASE ORAL
Qty: 90 CAPSULE | Refills: 3 | OUTPATIENT
Start: 2024-12-09

## 2025-01-06 ASSESSMENT — ANXIETY QUESTIONNAIRES
2. NOT BEING ABLE TO STOP OR CONTROL WORRYING: SEVERAL DAYS
8. IF YOU CHECKED OFF ANY PROBLEMS, HOW DIFFICULT HAVE THESE MADE IT FOR YOU TO DO YOUR WORK, TAKE CARE OF THINGS AT HOME, OR GET ALONG WITH OTHER PEOPLE?: SOMEWHAT DIFFICULT
4. TROUBLE RELAXING: SEVERAL DAYS
IF YOU CHECKED OFF ANY PROBLEMS ON THIS QUESTIONNAIRE, HOW DIFFICULT HAVE THESE PROBLEMS MADE IT FOR YOU TO DO YOUR WORK, TAKE CARE OF THINGS AT HOME, OR GET ALONG WITH OTHER PEOPLE: SOMEWHAT DIFFICULT
6. BECOMING EASILY ANNOYED OR IRRITABLE: SEVERAL DAYS
5. BEING SO RESTLESS THAT IT IS HARD TO SIT STILL: SEVERAL DAYS
7. FEELING AFRAID AS IF SOMETHING AWFUL MIGHT HAPPEN: MORE THAN HALF THE DAYS
7. FEELING AFRAID AS IF SOMETHING AWFUL MIGHT HAPPEN: MORE THAN HALF THE DAYS
1. FEELING NERVOUS, ANXIOUS, OR ON EDGE: MORE THAN HALF THE DAYS
3. WORRYING TOO MUCH ABOUT DIFFERENT THINGS: SEVERAL DAYS
GAD7 TOTAL SCORE: 9

## 2025-01-09 ENCOUNTER — OFFICE VISIT (OUTPATIENT)
Dept: FAMILY MEDICINE | Facility: CLINIC | Age: 65
End: 2025-01-09
Payer: COMMERCIAL

## 2025-01-09 VITALS
BODY MASS INDEX: 36.19 KG/M2 | TEMPERATURE: 98.2 F | WEIGHT: 212 LBS | OXYGEN SATURATION: 97 % | DIASTOLIC BLOOD PRESSURE: 72 MMHG | HEIGHT: 64 IN | HEART RATE: 64 BPM | SYSTOLIC BLOOD PRESSURE: 136 MMHG

## 2025-01-09 DIAGNOSIS — R73.9 ELEVATED BLOOD SUGAR: ICD-10-CM

## 2025-01-09 DIAGNOSIS — F41.9 ANXIETY: ICD-10-CM

## 2025-01-09 DIAGNOSIS — E11.9 NEW ONSET TYPE 2 DIABETES MELLITUS (H): ICD-10-CM

## 2025-01-09 DIAGNOSIS — F51.01 PRIMARY INSOMNIA: ICD-10-CM

## 2025-01-09 DIAGNOSIS — E78.1 HYPERTRIGLYCERIDEMIA: Primary | ICD-10-CM

## 2025-01-09 DIAGNOSIS — F07.81 POST CONCUSSIVE SYNDROME: ICD-10-CM

## 2025-01-09 DIAGNOSIS — G47.9 SLEEP DISORDER: ICD-10-CM

## 2025-01-09 DIAGNOSIS — H90.3 ASYMMETRICAL SENSORINEURAL HEARING LOSS: ICD-10-CM

## 2025-01-09 DIAGNOSIS — E78.5 HYPERLIPIDEMIA LDL GOAL <130: ICD-10-CM

## 2025-01-09 DIAGNOSIS — K86.2 PANCREATIC CYST: ICD-10-CM

## 2025-01-09 DIAGNOSIS — I10 ESSENTIAL HYPERTENSION, BENIGN: ICD-10-CM

## 2025-01-09 DIAGNOSIS — M1A.0710 CHRONIC IDIOPATHIC GOUT INVOLVING TOE OF RIGHT FOOT WITHOUT TOPHUS: ICD-10-CM

## 2025-01-09 LAB
ALT SERPL W P-5'-P-CCNC: 44 U/L (ref 0–50)
CHOLEST SERPL-MCNC: 170 MG/DL
EST. AVERAGE GLUCOSE BLD GHB EST-MCNC: 171 MG/DL
FASTING STATUS PATIENT QL REPORTED: NO
HBA1C MFR BLD: 7.6 % (ref 0–5.6)
HDLC SERPL-MCNC: 39 MG/DL
LDLC SERPL CALC-MCNC: 87 MG/DL
NONHDLC SERPL-MCNC: 131 MG/DL
TRIGL SERPL-MCNC: 222 MG/DL
URATE SERPL-MCNC: 5 MG/DL (ref 2.4–5.7)

## 2025-01-09 PROCEDURE — 83036 HEMOGLOBIN GLYCOSYLATED A1C: CPT | Performed by: FAMILY MEDICINE

## 2025-01-09 PROCEDURE — 90673 RIV3 VACCINE NO PRESERV IM: CPT | Performed by: FAMILY MEDICINE

## 2025-01-09 PROCEDURE — 90471 IMMUNIZATION ADMIN: CPT | Performed by: FAMILY MEDICINE

## 2025-01-09 PROCEDURE — 36415 COLL VENOUS BLD VENIPUNCTURE: CPT | Performed by: FAMILY MEDICINE

## 2025-01-09 PROCEDURE — 91320 SARSCV2 VAC 30MCG TRS-SUC IM: CPT | Performed by: FAMILY MEDICINE

## 2025-01-09 PROCEDURE — 84460 ALANINE AMINO (ALT) (SGPT): CPT | Performed by: FAMILY MEDICINE

## 2025-01-09 PROCEDURE — 99214 OFFICE O/P EST MOD 30 MIN: CPT | Mod: 25 | Performed by: FAMILY MEDICINE

## 2025-01-09 PROCEDURE — 80061 LIPID PANEL: CPT | Performed by: FAMILY MEDICINE

## 2025-01-09 PROCEDURE — 84550 ASSAY OF BLOOD/URIC ACID: CPT | Performed by: FAMILY MEDICINE

## 2025-01-09 PROCEDURE — G2211 COMPLEX E/M VISIT ADD ON: HCPCS | Performed by: FAMILY MEDICINE

## 2025-01-09 PROCEDURE — 90480 ADMN SARSCOV2 VAC 1/ONLY CMP: CPT | Performed by: FAMILY MEDICINE

## 2025-01-09 RX ORDER — PRAVASTATIN SODIUM 40 MG
TABLET ORAL
Qty: 90 TABLET | Refills: 3 | Status: SHIPPED | OUTPATIENT
Start: 2025-01-09

## 2025-01-09 RX ORDER — TRAZODONE HYDROCHLORIDE 100 MG/1
TABLET ORAL
Qty: 180 TABLET | Refills: 2 | Status: SHIPPED | OUTPATIENT
Start: 2025-01-09

## 2025-01-09 RX ORDER — TRIAMTERENE AND HYDROCHLOROTHIAZIDE 37.5; 25 MG/1; MG/1
CAPSULE ORAL
Qty: 90 CAPSULE | Refills: 3 | Status: SHIPPED | OUTPATIENT
Start: 2025-01-09

## 2025-01-09 RX ORDER — VENLAFAXINE HYDROCHLORIDE 150 MG/1
CAPSULE, EXTENDED RELEASE ORAL
Qty: 90 CAPSULE | Refills: 3 | Status: SHIPPED | OUTPATIENT
Start: 2025-01-09

## 2025-01-09 RX ORDER — ALLOPURINOL 300 MG/1
300 TABLET ORAL DAILY
Qty: 90 TABLET | Refills: 3 | Status: SHIPPED | OUTPATIENT
Start: 2025-01-09

## 2025-01-09 NOTE — PROGRESS NOTES
"  Assessment & Plan     Hypertriglyceridemia  Stable   - ALT; Future  - Lipid panel reflex to direct LDL Non-fasting; Future  - Lipid panel reflex to direct LDL Non-fasting  - ALT    Chronic idiopathic gout involving toe of right foot without tophus  No recnet flares  - Uric acid; Future  - allopurinol (ZYLOPRIM) 300 MG tablet; Take 1 tablet (300 mg) by mouth daily. After tapering up  - Uric acid    Asymmetrical sensorineural hearing loss  Worsening   - Adult Audiology  Referral; Future    Post concussive syndrome  \she had covid right AFTER THIS contributing to the cognitive issues   - Concussion  Referral; Future    Elevated blood sugar  Now diabetis  - Hemoglobin A1c; Future  - Hemoglobin A1c    Hyperlipidemia LDL goal <130  Stable   - pravastatin (PRAVACHOL) 40 MG tablet; TAKE 1 TABLET BY MOUTH nightly AT BEDTIMETAKE 1 TABLET BY MOUTH nightly AT BEDTIME Strength: 40 mg    Primary insomnia  works  - traZODone (DESYREL) 100 MG tablet; Take 2 tablets (200 mg) by mouth ONCE A DAY At Bedtime    Essential hypertension, benign    Controlled   - triamterene-HCTZ (DYAZIDE) 37.5-25 MG capsule; TAKE 1 CAPSULE BY MOUTH ONCE EVERY MORNING Strength: 37.5-25 mg    Anxiety    Cont   - venlafaxine (EFFEXOR XR) 150 MG 24 hr capsule; TAKE 1 CAPSULE (150 MG) BY MOUTH ONCE DAILY Strength: 150 mg    Sleep disorder    Needs further work up   - Adult Sleep Eval & Management Referral; Future    Pancreatic cyst    Mr   - MR Pancreas wo & w Contrast; Future    New onset type 2 diabetes mellitus (H)  Needs full education will calal in metformin   - Adult Diabetes Education  Referral; Future      Recheck 3-4 weeks     BMI  Estimated body mass index is 36.39 kg/m  as calculated from the following:    Height as of this encounter: 1.626 m (5' 4\").    Weight as of this encounter: 96.2 kg (212 lb).         3-4 weeks with glucose readings     Subjective   Marlyn is a 64 year old, presenting for the following health " issues:  Recheck Medication        1/9/2025     3:31 PM   Additional Questions   Roomed by Shyann RUEDA CMA     History of Present Illness       Mental Health Follow-up:  Patient presents to follow-up on Anxiety.    Patient's anxiety since last visit has been:  Medium  The patient is having other symptoms associated with anxiety.  Any significant life events: No, relationship concerns, job concerns, financial concerns, grief or loss and health concerns  Patient is feeling anxious or having panic attacks.  Patient has no concerns about alcohol or drug use.    Hyperlipidemia:  She presents for follow up of hyperlipidemia.   She is taking medication to lower cholesterol. She is not having myalgia or other side effects to statin medications.    Hypertension: She presents for follow up of hypertension.  She does check blood pressure  regularly outside of the clinic. Outpatient blood pressures have not been over 140/90. She does not follow a low salt diet.     She eats 2-3 servings of fruits and vegetables daily.She consumes 0 sweetened beverage(s) daily.She exercises with enough effort to increase her heart rate 20 to 29 minutes per day.  She exercises with enough effort to increase her heart rate 3 or less days per week.   She is taking medications regularly.         Hyperlipidemia Follow-Up  Recent Labs   Lab Test 02/07/23  1554 10/05/21  1030   CHOL 171 163   HDL 40* 37*   LDL 84 85   TRIG 233* 206*     Hypertension Follow-up  BP Readings from Last 3 Encounters:   01/09/25 136/72   12/08/24 139/63   10/30/24 130/69       Anxiety     Social History     Tobacco Use    Smoking status: Never     Passive exposure: Never    Smokeless tobacco: Never   Vaping Use    Vaping status: Never Used   Substance Use Topics    Alcohol use: Yes     Alcohol/week: 0.0 standard drinks of alcohol     Comment: rare     Drug use: No         12/21/2023     8:04 AM 6/10/2024     1:42 PM 1/6/2025    12:02 PM   ELIZA-7 SCORE   Total Score 10 (moderate  "anxiety) 3 (minimal anxiety) 9 (mild anxiety)   Total Score 10 3 9        Patient-reported         3/7/2022     2:39 PM 12/21/2023     8:04 AM 6/10/2024     1:44 PM   PHQ   PHQ-9 Total Score 5 8 6   Q9: Thoughts of better off dead/self-harm past 2 weeks Not at all Not at all Not at all             Fatigued struggling since the concussion din't follow up after this as she then had covi. Has noted hard time concentratoing she has noted worsening in the ear that she can still hear in   History of  vertigo still occurring           Objective    /72 (BP Location: Right arm, Patient Position: Sitting, Cuff Size: Adult Regular)   Pulse 64   Temp 98.2  F (36.8  C) (Tympanic)   Ht 1.626 m (5' 4\")   Wt 96.2 kg (212 lb)   SpO2 97%   BMI 36.39 kg/m    Body mass index is 36.39 kg/m .  Physical Exam   GENERAL: no distress and fatigued  NEURO: Normal strength and tone, mentation intact and speech normal  PSYCH: mentation appears normal, affect normal/bright    Results for orders placed or performed in visit on 01/09/25   Hemoglobin A1c     Status: Abnormal   Result Value Ref Range    Estimated Average Glucose 171 (H) <117 mg/dL    Hemoglobin A1C 7.6 (H) 0.0 - 5.6 %   Uric acid     Status: Normal   Result Value Ref Range    Uric Acid 5.0 2.4 - 5.7 mg/dL   Lipid panel reflex to direct LDL Non-fasting     Status: Abnormal   Result Value Ref Range    Cholesterol 170 <200 mg/dL    Triglycerides 222 (H) <150 mg/dL    Direct Measure HDL 39 (L) >=50 mg/dL    LDL Cholesterol Calculated 87 <100 mg/dL    Non HDL Cholesterol 131 (H) <130 mg/dL    Patient Fasting > 8hrs? No     Narrative    Cholesterol  Desirable: < 200 mg/dL  Borderline High: 200 - 239 mg/dL  High: >= 240 mg/dL    Triglycerides  Normal: < 150 mg/dL  Borderline High: 150 - 199 mg/dL  High: 200-499 mg/dL  Very High: >= 500 mg/dL    Direct Measure HDL  Female: >= 50 mg/dL   Male: >= 40 mg/dL    LDL Cholesterol  Desirable: < 100 mg/dL  Above Desirable: 100 - 129 mg/dL "   Borderline High: 130 - 159 mg/dL   High:  160 - 189 mg/dL   Very High: >= 190 mg/dL    Non HDL Cholesterol  Desirable: < 130 mg/dL  Above Desirable: 130 - 159 mg/dL  Borderline High: 160 - 189 mg/dL  High: 190 - 219 mg/dL  Very High: >= 220 mg/dL   ALT     Status: Normal   Result Value Ref Range    ALT 44 0 - 50 U/L           Signed Electronically by: Carlie Real MD

## 2025-01-13 RX ORDER — METFORMIN HYDROCHLORIDE 500 MG/1
1000 TABLET, EXTENDED RELEASE ORAL
Qty: 60 TABLET | Refills: 1 | Status: SHIPPED | OUTPATIENT
Start: 2025-01-13

## 2025-01-13 RX ORDER — LANCETS
EACH MISCELLANEOUS
Qty: 100 EACH | Refills: 6 | Status: SHIPPED | OUTPATIENT
Start: 2025-01-13

## 2025-01-20 ENCOUNTER — HOSPITAL ENCOUNTER (OUTPATIENT)
Dept: MRI IMAGING | Facility: HOSPITAL | Age: 65
Discharge: HOME | End: 2025-01-20
Attending: FAMILY MEDICINE | Admitting: FAMILY MEDICINE
Payer: COMMERCIAL

## 2025-01-20 DIAGNOSIS — K86.2 PANCREATIC CYST: ICD-10-CM

## 2025-01-20 PROCEDURE — 255N000002 HC RX 255 OP 636: Performed by: FAMILY MEDICINE

## 2025-01-20 PROCEDURE — A9585 GADOBUTROL INJECTION: HCPCS | Performed by: FAMILY MEDICINE

## 2025-01-20 PROCEDURE — 74183 MRI ABD W/O CNTR FLWD CNTR: CPT

## 2025-01-20 RX ORDER — GADOBUTROL 604.72 MG/ML
0.1 INJECTION INTRAVENOUS ONCE
Status: COMPLETED | OUTPATIENT
Start: 2025-01-20 | End: 2025-01-20

## 2025-01-20 RX ADMIN — GADOBUTROL 10 ML: 604.72 INJECTION INTRAVENOUS at 14:31

## 2025-02-05 ENCOUNTER — PATIENT OUTREACH (OUTPATIENT)
Dept: CARE COORDINATION | Facility: CLINIC | Age: 65
End: 2025-02-05
Payer: COMMERCIAL

## 2025-02-17 ENCOUNTER — PATIENT OUTREACH (OUTPATIENT)
Dept: CARE COORDINATION | Facility: CLINIC | Age: 65
End: 2025-02-17
Payer: COMMERCIAL

## 2025-02-28 PROBLEM — E11.9 TYPE 2 DIABETES, HBA1C GOAL < 8% (H): Status: ACTIVE | Noted: 2025-02-01

## 2025-03-10 ENCOUNTER — MYC REFILL (OUTPATIENT)
Dept: FAMILY MEDICINE | Facility: CLINIC | Age: 65
End: 2025-03-10
Payer: COMMERCIAL

## 2025-03-10 DIAGNOSIS — E11.9 NEW ONSET TYPE 2 DIABETES MELLITUS (H): ICD-10-CM

## 2025-03-10 DIAGNOSIS — F51.01 PRIMARY INSOMNIA: ICD-10-CM

## 2025-03-10 RX ORDER — LANCETS
EACH MISCELLANEOUS
Qty: 100 EACH | Refills: 6 | OUTPATIENT
Start: 2025-03-10

## 2025-03-10 RX ORDER — METFORMIN HYDROCHLORIDE 500 MG/1
1000 TABLET, EXTENDED RELEASE ORAL
Qty: 60 TABLET | Refills: 1 | Status: SHIPPED | OUTPATIENT
Start: 2025-03-10

## 2025-03-10 RX ORDER — TRAZODONE HYDROCHLORIDE 100 MG/1
TABLET ORAL
Qty: 180 TABLET | Refills: 2 | OUTPATIENT
Start: 2025-03-10

## 2025-03-12 ENCOUNTER — HOSPITAL ENCOUNTER (OUTPATIENT)
Dept: MAMMOGRAPHY | Facility: CLINIC | Age: 65
Discharge: HOME | End: 2025-03-12
Attending: FAMILY MEDICINE
Payer: COMMERCIAL

## 2025-03-12 DIAGNOSIS — Z12.31 VISIT FOR SCREENING MAMMOGRAM: ICD-10-CM

## 2025-03-12 PROCEDURE — 77063 BREAST TOMOSYNTHESIS BI: CPT

## 2025-04-01 ENCOUNTER — TELEPHONE (OUTPATIENT)
Dept: FAMILY MEDICINE | Facility: CLINIC | Age: 65
End: 2025-04-01

## 2025-04-01 ENCOUNTER — LAB (OUTPATIENT)
Dept: LAB | Facility: CLINIC | Age: 65
End: 2025-04-01
Payer: COMMERCIAL

## 2025-04-01 DIAGNOSIS — M79.10 MYALGIA: ICD-10-CM

## 2025-04-01 DIAGNOSIS — M79.10 MYALGIA: Primary | ICD-10-CM

## 2025-04-01 DIAGNOSIS — E11.9 TYPE 2 DIABETES, HBA1C GOAL < 8% (H): Primary | ICD-10-CM

## 2025-04-01 LAB
ALBUMIN SERPL BCG-MCNC: 4.5 G/DL (ref 3.5–5.2)
ALP SERPL-CCNC: 81 U/L (ref 40–150)
ALT SERPL W P-5'-P-CCNC: 25 U/L (ref 0–50)
ANION GAP SERPL CALCULATED.3IONS-SCNC: 13 MMOL/L (ref 7–15)
AST SERPL W P-5'-P-CCNC: 30 U/L (ref 0–45)
BILIRUB SERPL-MCNC: 0.5 MG/DL
BUN SERPL-MCNC: 15.2 MG/DL (ref 8–23)
CALCIUM SERPL-MCNC: 10.6 MG/DL (ref 8.8–10.4)
CHLORIDE SERPL-SCNC: 99 MMOL/L (ref 98–107)
CREAT SERPL-MCNC: 0.76 MG/DL (ref 0.51–0.95)
CREAT UR-MCNC: 94.9 MG/DL
EGFRCR SERPLBLD CKD-EPI 2021: 88 ML/MIN/1.73M2
GLUCOSE SERPL-MCNC: 102 MG/DL (ref 70–99)
HCO3 SERPL-SCNC: 27 MMOL/L (ref 22–29)
LACTATE SERPL-SCNC: 1.3 MMOL/L (ref 0.7–2)
MICROALBUMIN UR-MCNC: 55 MG/L
MICROALBUMIN/CREAT UR: 57.96 MG/G CR (ref 0–25)
POTASSIUM SERPL-SCNC: 4.6 MMOL/L (ref 3.4–5.3)
PROT SERPL-MCNC: 7.4 G/DL (ref 6.4–8.3)
SODIUM SERPL-SCNC: 139 MMOL/L (ref 135–145)

## 2025-04-01 PROCEDURE — 36415 COLL VENOUS BLD VENIPUNCTURE: CPT

## 2025-04-01 PROCEDURE — 80053 COMPREHEN METABOLIC PANEL: CPT

## 2025-04-01 PROCEDURE — 82043 UR ALBUMIN QUANTITATIVE: CPT

## 2025-04-01 PROCEDURE — 82570 ASSAY OF URINE CREATININE: CPT

## 2025-04-01 PROCEDURE — 83605 ASSAY OF LACTIC ACID: CPT

## 2025-04-01 NOTE — TELEPHONE ENCOUNTER
Marlyn states that she has been newly diagnosed with Type II diabetes and has been taking Metformin for the past 2 months now. States the last 2-3 weeks she has been having terrible muscle pain in her neck, legs, and especially her left arm. States she has not been able to sleep for the past 3 nights due to the severe pain in her left arm. States she cannot raise it past her chest. She is wondering if the Metformin is reacting with some of her other meds or vice versa? She would like to know what Dr Real recommends she do, should she stop the metformin? She will not take it today until she hears from Dr Childers care team. Thank you!    Natalia Hawkins RN

## 2025-04-01 NOTE — TELEPHONE ENCOUNTER
She should stop the metformin.  I am putting in some labs to check her renal function and see her lactate level.  I would like her to have those labs either later today or tomorrow. Carlie Real M.D.

## 2025-04-01 NOTE — TELEPHONE ENCOUNTER
I gave Marlyn the message from Dr Real from today at 4:05 pm  Pt was notified to go to lab for tests.  I transferred her to lab to make an appt at [facility] today.  She will stop the Metformin.

## 2025-04-07 ENCOUNTER — MYC REFILL (OUTPATIENT)
Dept: FAMILY MEDICINE | Facility: CLINIC | Age: 65
End: 2025-04-07
Payer: COMMERCIAL

## 2025-04-07 DIAGNOSIS — M1A.0710 CHRONIC IDIOPATHIC GOUT INVOLVING TOE OF RIGHT FOOT WITHOUT TOPHUS: ICD-10-CM

## 2025-04-08 RX ORDER — ALLOPURINOL 300 MG/1
300 TABLET ORAL DAILY
Qty: 90 TABLET | Refills: 1 | Status: SHIPPED | OUTPATIENT
Start: 2025-04-08

## 2025-04-16 ENCOUNTER — OFFICE VISIT (OUTPATIENT)
Dept: FAMILY MEDICINE | Facility: CLINIC | Age: 65
End: 2025-04-16
Payer: COMMERCIAL

## 2025-04-16 VITALS
HEART RATE: 55 BPM | HEIGHT: 64 IN | BODY MASS INDEX: 33.8 KG/M2 | OXYGEN SATURATION: 95 % | SYSTOLIC BLOOD PRESSURE: 138 MMHG | WEIGHT: 198 LBS | DIASTOLIC BLOOD PRESSURE: 86 MMHG | RESPIRATION RATE: 16 BRPM | TEMPERATURE: 99.3 F

## 2025-04-16 DIAGNOSIS — N20.0 KIDNEY STONES: ICD-10-CM

## 2025-04-16 DIAGNOSIS — E11.9 CONTROLLED TYPE 2 DIABETES MELLITUS WITHOUT COMPLICATION, WITHOUT LONG-TERM CURRENT USE OF INSULIN (H): Primary | ICD-10-CM

## 2025-04-16 DIAGNOSIS — M1A.0710 CHRONIC IDIOPATHIC GOUT INVOLVING TOE OF RIGHT FOOT WITHOUT TOPHUS: ICD-10-CM

## 2025-04-16 DIAGNOSIS — J01.01 ACUTE RECURRENT MAXILLARY SINUSITIS: ICD-10-CM

## 2025-04-16 LAB
EST. AVERAGE GLUCOSE BLD GHB EST-MCNC: 114 MG/DL
HBA1C MFR BLD: 5.6 % (ref 0–5.6)
HOLD SPECIMEN: NORMAL
URATE SERPL-MCNC: 5 MG/DL (ref 2.4–5.7)

## 2025-04-16 PROCEDURE — 84550 ASSAY OF BLOOD/URIC ACID: CPT | Performed by: FAMILY MEDICINE

## 2025-04-16 PROCEDURE — 36415 COLL VENOUS BLD VENIPUNCTURE: CPT | Performed by: FAMILY MEDICINE

## 2025-04-16 PROCEDURE — 99214 OFFICE O/P EST MOD 30 MIN: CPT | Performed by: FAMILY MEDICINE

## 2025-04-16 PROCEDURE — 83036 HEMOGLOBIN GLYCOSYLATED A1C: CPT | Performed by: FAMILY MEDICINE

## 2025-04-16 NOTE — PROGRESS NOTES
{PROVIDER CHARTING PREFERENCE:354517}    Levon Cline is a 64 year old, presenting for the following health issues:  Diabetes        4/16/2025     2:09 PM   Additional Questions   Roomed by Shyann RUEDA CMA     History of Present Illness       Diabetes:   She presents for follow up of diabetes.  She is checking home blood glucose two times daily.   She checks blood glucose before meals.  Blood glucose is never over 200 and never under 70.  When her blood glucose is low, the patient is asymptomatic for confusion, blurred vision, lethargy and reports not feeling dizzy, shaky, or weak.  She is concerned about other.   She is having weight loss.            She eats 2-3 servings of fruits and vegetables daily.She consumes 0 sweetened beverage(s) daily.She exercises with enough effort to increase her heart rate 30 to 60 minutes per day.  She exercises with enough effort to increase her heart rate 5 days per week.   She is taking medications regularly.      Stopped the Metformin due to possible side effects, muscle aches.   Would like to try again to see if same issues.   Had     Diabetes Follow-up    BP Readings from Last 2 Encounters:   04/16/25 138/86   01/09/25 136/72     Hemoglobin A1C (%)   Date Value   01/09/2025 7.6 (H)   06/19/2013 5.6   05/22/2006 5.0     LDL Cholesterol Calculated (mg/dL)   Date Value   01/09/2025 87   02/07/2023 84   07/29/2020 95   08/06/2019 85       {Reference  Diabetes Management Resources  Blood Glucose Log - 3 weeks  Blood Glucose Log with Food and Insulin Record :350574}      Wt Readings from Last 5 Encounters:   04/16/25 89.8 kg (198 lb)   01/09/25 96.2 kg (212 lb)   10/30/24 90.7 kg (200 lb)   07/02/24 95.5 kg (210 lb 9.6 oz)   12/21/23 95.7 kg (211 lb)                 Review of Systems  Constitutional, HEENT, cardiovascular, pulmonary, gi and gu systems are negative, except as otherwise noted.      Objective    /86 (BP Location: Right arm, Patient Position: Sitting, Cuff  "Size: Adult Large)   Pulse 55   Temp 99.3  F (37.4  C) (Tympanic)   Resp 16   Ht 1.626 m (5' 4\")   Wt 89.8 kg (198 lb)   SpO2 95%   BMI 33.99 kg/m    Body mass index is 33.99 kg/m .  Physical Exam   {Unselected :108043}    {Diagnostic Test Results (Optional):089677}        Signed Electronically by: Carlie Real MD  {Email feedback regarding this note to [email or web address]   :[id number]}  " -----------         ------                     Extra Serum Separator T...[[id number]]                      Final result               Extra Green Top (Lithiu...[[id number]]                      Final result               Extra Purple Top Tube[[id number]]                           Final result                 Please view results for these tests on the individual orders.   Extra Serum Separator Tube (SST)     Status: None   Result Value Ref Range    Hold Specimen JIC    Extra Green Top (Lithium Heparin) Tube     Status: None   Result Value Ref Range    Hold Specimen JIC    Extra Purple Top Tube     Status: None   Result Value Ref Range    Hold Specimen JIC    HEMOGLOBIN A1C     Status: Normal   Result Value Ref Range    Estimated Average Glucose 114 <117 mg/dL    Hemoglobin A1C 5.6 0.0 - 5.6 %   Uric acid     Status: Normal   Result Value Ref Range    Uric Acid 5.0 2.4 - 5.7 mg/dL           Signed Electronically by: Carlie Real MD

## 2025-04-16 NOTE — PATIENT INSTRUCTIONS
Sinus pressure is primarily a problem of drainage.  You can best help your body clear the sinus secretions and pressure by opening up the natural passageways which are often blocked by viral colds and allergies.      Short courses of a nasal decongestant spray (Afrin or Neosinephrine) are one of the most effective tools in opening sinus drainage passageways.  Their use should be restricted to 3 days though due to the high risk of nasal addiction.  Pseudoephedrine or phenylephrine (Sudafed) is often helpful but it can cause elevations in blood pressure and insomnia.     Sometimes a nasal saline spray will help rinse out the nasal passages and feel good.     Guaifenesin (Robitussin or Mucinex) helps loosen secretions and often help make the mucous more liquid and easier to clear.    For pain and fevers, acetaminophen (Tylenol) is most appropriate.  Ibuprofen (Advil) or naproxen (Aleve) are useful too and last longer but they can cause elevation of blood pressure or stomach problems.    Antihistamines (Benadryl, Dimetapp, etc.) cause sedation, confusion, bowel and urinary abnormalities and are of little use for infectious causes of cough and nasal congestion.  Their use should be reserved for allergic symptoms.    The body needs to be treated well in order to help heal itself.  Rest as needed.  It is ok to reduce food intake if appetite is poor but it is quite important to maintain/increase fluid intake.  Sinus pressure and infections usually go away on their own with appropriate care.  If symptoms worsen or persist beyond 10 days, an antibiotic might be worth trying to treat a possible bacterial component.        Restart metformin with 1 ttab daily

## 2025-04-19 ENCOUNTER — HEALTH MAINTENANCE LETTER (OUTPATIENT)
Age: 65
End: 2025-04-19

## 2025-06-09 DIAGNOSIS — F51.01 PRIMARY INSOMNIA: ICD-10-CM

## 2025-06-09 DIAGNOSIS — I10 ESSENTIAL HYPERTENSION, BENIGN: ICD-10-CM

## 2025-06-09 RX ORDER — TRAZODONE HYDROCHLORIDE 100 MG/1
TABLET ORAL
Qty: 180 TABLET | Refills: 0 | Status: SHIPPED | OUTPATIENT
Start: 2025-06-09

## 2025-06-09 RX ORDER — ATENOLOL 50 MG/1
50 TABLET ORAL DAILY
Qty: 90 TABLET | Refills: 0 | Status: SHIPPED | OUTPATIENT
Start: 2025-06-09

## 2025-06-09 NOTE — TELEPHONE ENCOUNTER
Received call from Patient.  Patient trying to get refill on atenolol and trazadone.  Has 1 refill available on both medications at [facility] pharmacy.  [facility] pharmacy has not been available all weekend.  Patient called the main line and they do not know when pharmacy will be open.  Would like prescriptions sent to [facility] pharmacy.  Riley VELA, Clinic RN   [facility]

## 2025-06-10 ENCOUNTER — MYC REFILL (OUTPATIENT)
Dept: FAMILY MEDICINE | Facility: CLINIC | Age: 65
End: 2025-06-10

## 2025-06-10 DIAGNOSIS — E11.9 NEW ONSET TYPE 2 DIABETES MELLITUS (H): ICD-10-CM

## 2025-06-10 DIAGNOSIS — I10 ESSENTIAL HYPERTENSION, BENIGN: ICD-10-CM

## 2025-06-10 DIAGNOSIS — E11.9 CONTROLLED TYPE 2 DIABETES MELLITUS WITHOUT COMPLICATION, WITHOUT LONG-TERM CURRENT USE OF INSULIN (H): ICD-10-CM

## 2025-06-10 RX ORDER — TRIAMTERENE AND HYDROCHLOROTHIAZIDE 37.5; 25 MG/1; MG/1
CAPSULE ORAL
Qty: 90 CAPSULE | Refills: 1 | Status: SHIPPED | OUTPATIENT
Start: 2025-06-10

## 2025-06-12 ENCOUNTER — MYC REFILL (OUTPATIENT)
Dept: FAMILY MEDICINE | Facility: CLINIC | Age: 65
End: 2025-06-12
Payer: COMMERCIAL

## 2025-06-12 DIAGNOSIS — R09.82 PND (POST-NASAL DRIP): ICD-10-CM

## 2025-06-12 RX ORDER — FLUTICASONE PROPIONATE 50 MCG
SPRAY, SUSPENSION (ML) NASAL
Qty: 16 G | Refills: 2 | Status: SHIPPED | OUTPATIENT
Start: 2025-06-12

## 2025-06-17 ENCOUNTER — OFFICE VISIT (OUTPATIENT)
Dept: FAMILY MEDICINE | Facility: CLINIC | Age: 65
End: 2025-06-17
Payer: COMMERCIAL

## 2025-06-17 ENCOUNTER — RESULTS FOLLOW-UP (OUTPATIENT)
Dept: FAMILY MEDICINE | Facility: CLINIC | Age: 65
End: 2025-06-17

## 2025-06-17 VITALS
OXYGEN SATURATION: 98 % | HEART RATE: 70 BPM | TEMPERATURE: 98.3 F | HEIGHT: 64 IN | RESPIRATION RATE: 12 BRPM | SYSTOLIC BLOOD PRESSURE: 110 MMHG | BODY MASS INDEX: 32.95 KG/M2 | DIASTOLIC BLOOD PRESSURE: 78 MMHG | WEIGHT: 193 LBS

## 2025-06-17 DIAGNOSIS — E11.9 NEW ONSET TYPE 2 DIABETES MELLITUS (H): ICD-10-CM

## 2025-06-17 DIAGNOSIS — E11.9 TYPE 2 DIABETES, HBA1C GOAL < 8% (H): Primary | ICD-10-CM

## 2025-06-17 DIAGNOSIS — R39.9 UTI SYMPTOMS: ICD-10-CM

## 2025-06-17 LAB
ALBUMIN UR-MCNC: NEGATIVE MG/DL
APPEARANCE UR: CLEAR
BACTERIA #/AREA URNS HPF: ABNORMAL /HPF
BILIRUB UR QL STRIP: NEGATIVE
COLOR UR AUTO: YELLOW
GLUCOSE UR STRIP-MCNC: NEGATIVE MG/DL
HGB UR QL STRIP: NEGATIVE
KETONES UR STRIP-MCNC: NEGATIVE MG/DL
LEUKOCYTE ESTERASE UR QL STRIP: ABNORMAL
NITRATE UR QL: NEGATIVE
PH UR STRIP: 7 [PH] (ref 5–7)
RBC #/AREA URNS AUTO: ABNORMAL /HPF
SP GR UR STRIP: 1.02 (ref 1–1.03)
SQUAMOUS #/AREA URNS AUTO: ABNORMAL /LPF
UROBILINOGEN UR STRIP-ACNC: 1 E.U./DL
WBC #/AREA URNS AUTO: ABNORMAL /HPF

## 2025-06-17 PROCEDURE — 81001 URINALYSIS AUTO W/SCOPE: CPT | Performed by: FAMILY MEDICINE

## 2025-06-17 PROCEDURE — 99213 OFFICE O/P EST LOW 20 MIN: CPT | Performed by: FAMILY MEDICINE

## 2025-06-17 RX ORDER — METFORMIN HYDROCHLORIDE 500 MG/1
1000 TABLET, EXTENDED RELEASE ORAL
Qty: 180 TABLET | Refills: 3 | Status: SHIPPED | OUTPATIENT
Start: 2025-06-17

## 2025-06-17 NOTE — PROGRESS NOTES
"  Assessment & Plan     Type 2 diabetes, HbA1C goal < 8% (H)  Oncrease   - semaglutide (OZEMPIC) 2 MG/3ML pen; Inject 0.5 mg subcutaneously every 7 days.  - Semaglutide, 1 MG/DOSE, (OZEMPIC) 4 MG/3ML pen; Inject 1 mg subcutaneously every 7 days.  - Semaglutide, 2 MG/DOSE, (OZEMPIC) 8 MG/3ML pen; Inject 2 mg subcutaneously every 7 days.    - metFORMIN (GLUCOPHAGE XR) 500 MG 24 hr tablet; Take 2 tablets (1,000 mg) by mouth daily (with dinner).    UTI symptoms  neg  - UA Macroscopic with reflex to Microscopic and Culture - Lab Collect  - UA Microscopic with Reflex to Culture          BMI  Estimated body mass index is 33.13 kg/m  as calculated from the following:    Height as of this encounter: 1.626 m (5' 4\").    Weight as of this encounter: 87.5 kg (193 lb).   Weight management plan: wegovy      F/unit(s) 3 months for weight check    Levon Cline is a 64 year old, presenting for the following health issues:  Urinary Problem (Burning after intercourse) and Diabetes        6/17/2025     8:42 AM   Additional Questions   Roomed by Shyann RUEDA CMA         History of Present Illness       Diabetes:   She presents for follow up of diabetes.  She is checking home blood glucose one time daily.   She checks blood glucose before meals.  Blood glucose is never over 200 and never under 70.  When her blood glucose is low, the patient is asymptomatic for confusion, blurred vision, lethargy and reports not feeling dizzy, shaky, or weak.  She is concerned about other.    She is not experiencing numbness or burning in feet, excessive thirst, blurry vision, weight changes or redness, sores or blisters on feet.           She eats 2-3 servings of fruits and vegetables daily.She consumes 0 sweetened beverage(s) daily.She exercises with enough effort to increase her heart rate 30 to 60 minutes per day.  She exercises with enough effort to increase her heart rate 5 days per week.   She is taking medications regularly.        Blood sugar " "115 this morning.   Discuss ozempic - increasing dose   Walking 2-3 miles per day  Avoiding sugar   Diabetes Follow-up    BP Readings from Last 2 Encounters:   06/17/25 110/78   04/16/25 138/86     Hemoglobin A1C (%)   Date Value   04/16/2025 5.6   01/09/2025 7.6 (H)   06/19/2013 5.6   05/22/2006 5.0     LDL Cholesterol Calculated (mg/dL)   Date Value   01/09/2025 87   02/07/2023 84   07/29/2020 95   08/06/2019 85             Had some pain with sex  then she had some burning a day or 2 later ? Urinary tract infection       Review of Systems  Constitutional, HEENT, cardiovascular, pulmonary, gi and gu systems are negative, except as otherwise noted.      Objective    /78 (BP Location: Right arm, Patient Position: Sitting, Cuff Size: Adult Regular)   Pulse 70   Temp 98.3  F (36.8  C) (Tympanic)   Resp 12   Ht 1.626 m (5' 4\")   Wt 87.5 kg (193 lb)   SpO2 98%   BMI 33.13 kg/m    Body mass index is 33.13 kg/m .  Physical Exam   GENERAL: alert and no distress    Results for orders placed or performed in visit on 06/17/25   UA Macroscopic with reflex to Microscopic and Culture - Lab Collect     Status: Abnormal    Specimen: Urine, Clean Catch   Result Value Ref Range    Color Urine Yellow Colorless, Straw, Light Yellow, Yellow    Appearance Urine Clear Clear    Glucose Urine Negative Negative mg/dL    Bilirubin Urine Negative Negative    Ketones Urine Negative Negative mg/dL    Specific Gravity Urine 1.020 1.003 - 1.035    Blood Urine Negative Negative    pH Urine 7.0 5.0 - 7.0    Protein Albumin Urine Negative Negative mg/dL    Urobilinogen Urine 1.0 0.2, 1.0 E.U./dL    Nitrite Urine Negative Negative    Leukocyte Esterase Urine Small (A) Negative   UA Microscopic with Reflex to Culture     Status: Abnormal   Result Value Ref Range    Bacteria Urine Many (A) None Seen /HPF    RBC Urine 2-5 (A) 0-2 /HPF /HPF    WBC Urine 5-10 (A) 0-5 /HPF /HPF    Squamous Epithelials Urine Few (A) None Seen /LPF    Narrative "    Urine Culture not indicated           Signed Electronically by: Carlie Real MD

## 2025-07-16 NOTE — TELEPHONE ENCOUNTER
"Routing refill request to provider for review/approval because:  Drug interaction warning        Requested Prescriptions   Pending Prescriptions Disp Refills    traZODone (DESYREL) 100 MG tablet [Pharmacy Med Name: traZODone HCl Oral Tablet 100 MG] 180 tablet 0     Sig: Take 2 tablets (200 mg) by mouth ONCE A DAY At Bedtime       Serotonin Modulators Passed - 9/1/2023  9:05 PM        Passed - Recent (12 mo) or future (30 days) visit within the authorizing provider's specialty     Patient has had an office visit with the authorizing provider or a provider within the authorizing providers department within the previous 12 mos or has a future within next 30 days. See \"Patient Info\" tab in inbasket, or \"Choose Columns\" in Meds & Orders section of the refill encounter.              Passed - Medication is active on med list        Passed - Patient is age 18 or older        Passed - No active pregnancy on record        Passed - No positive pregnancy test in past 12 months                 Jocelyn Chavez RN 09/05/23 1:12 PM   " [Patient Intake Form Reviewed] : Patient intake form was reviewed

## 2025-08-02 ENCOUNTER — HEALTH MAINTENANCE LETTER (OUTPATIENT)
Age: 65
End: 2025-08-02

## 2025-08-21 ENCOUNTER — PATIENT OUTREACH (OUTPATIENT)
Dept: CARE COORDINATION | Facility: CLINIC | Age: 65
End: 2025-08-21
Payer: COMMERCIAL

## (undated) DEVICE — POSISEPX

## (undated) DEVICE — PACK SET-UP STD 9102

## (undated) DEVICE — SOL NACL 0.9% INJ 1000ML BAG 07983-09

## (undated) DEVICE — ESU SUCTION CAUTERY 10FR FOOT CONTROL E3310

## (undated) DEVICE — ESU ELEC BLADE 2.75" COATED/INSULATED E1455

## (undated) DEVICE — NDL 25GA 1.5" 305127

## (undated) DEVICE — SPONGE COTTONOID 1/2X3" 80-1407

## (undated) DEVICE — DRAPE SHEET HALF 40X60" 9358

## (undated) DEVICE — DECANTER TRANSFER DEVICE 2008S

## (undated) DEVICE — PACK MINOR SBA15MIFSE

## (undated) DEVICE — SPECIMEN CONTAINER 4OZ

## (undated) DEVICE — BLADE TURBINATE INFERIOR 2MM ROTATE  1882040HR

## (undated) DEVICE — ANTIFOG SOLUTION W/FOAM PAD CF-1001

## (undated) DEVICE — NDL SPINAL 25GA 3.5" QUINCKE 405180

## (undated) DEVICE — GLOVE PROTEXIS W/NEU-THERA 7.5  2D73TE75

## (undated) DEVICE — SUCTION CANISTER MEDIVAC LINER 1500ML W/LID 65651-515

## (undated) DEVICE — TUBING SUCTION 10'X3/16" N510

## (undated) DEVICE — NDL DENTAL 27GA LONG 8881400058

## (undated) DEVICE — BASIN SET MINOR DISP

## (undated) DEVICE — MASK CONE SHAPED 00152

## (undated) DEVICE — GOWN XLG DISP 9545

## (undated) DEVICE — TRACKER ENT OTS INSTRUMENT FUSION 9733533

## (undated) DEVICE — MARKER SKIN DOUBLE TIP W/FLEXI-RULER W/LABELS

## (undated) DEVICE — BLADE SINUS RAD12 CVD 4MM FUSION ROTATE W/TRACKING

## (undated) DEVICE — NDL 19GA 1.5"

## (undated) DEVICE — ESU PENCIL SMOKE EVAC W/ROCKER SWITCH 0703-047-000

## (undated) DEVICE — DRAPE SPLIT EENT 76X124" 3X28" 9447

## (undated) DEVICE — SYR 10ML FINGER CONTROL W/O NDL 309695

## (undated) DEVICE — ESU SUCTION CAUTERY 10FR FOOT CONTROL E2505-10FR

## (undated) DEVICE — TRACKER PATIENT NON-INVASIVE AXIEM 9734887XOM

## (undated) DEVICE — SOL WATER IRRIG 1000ML BOTTLE 07139-09

## (undated) DEVICE — ENDO SHEATH STORZ SHARPSITE ENDOSCRUB 0DEG 4MM 1912000

## (undated) DEVICE — SUCTION TIP YANKAUER W/O VENT K86

## (undated) RX ORDER — FENTANYL CITRATE 50 UG/ML
INJECTION, SOLUTION INTRAMUSCULAR; INTRAVENOUS
Status: DISPENSED
Start: 2022-05-12

## (undated) RX ORDER — ACETAMINOPHEN 325 MG/1
TABLET ORAL
Status: DISPENSED
Start: 2022-05-12

## (undated) RX ORDER — COCAINE HYDROCHLORIDE 40 MG/ML
SOLUTION NASAL
Status: DISPENSED
Start: 2022-05-12

## (undated) RX ORDER — LIDOCAINE HYDROCHLORIDE 40 MG/ML
SOLUTION TOPICAL
Status: DISPENSED
Start: 2022-05-12

## (undated) RX ORDER — CEFAZOLIN SODIUM 1 G/3ML
INJECTION, POWDER, FOR SOLUTION INTRAMUSCULAR; INTRAVENOUS
Status: DISPENSED
Start: 2022-05-12

## (undated) RX ORDER — PROPOFOL 10 MG/ML
INJECTION, EMULSION INTRAVENOUS
Status: DISPENSED
Start: 2022-09-21

## (undated) RX ORDER — OXYCODONE HYDROCHLORIDE 5 MG/1
TABLET ORAL
Status: DISPENSED
Start: 2022-05-12

## (undated) RX ORDER — PROPOFOL 10 MG/ML
INJECTION, EMULSION INTRAVENOUS
Status: DISPENSED
Start: 2022-05-12

## (undated) RX ORDER — LIDOCAINE HYDROCHLORIDE AND EPINEPHRINE 10; 10 MG/ML; UG/ML
INJECTION, SOLUTION INFILTRATION; PERINEURAL
Status: DISPENSED
Start: 2022-05-12

## (undated) RX ORDER — LIDOCAINE HYDROCHLORIDE AND EPINEPHRINE BITARTRATE 20; .01 MG/ML; MG/ML
INJECTION, SOLUTION SUBCUTANEOUS
Status: DISPENSED
Start: 2022-05-12